# Patient Record
Sex: MALE | Race: WHITE | NOT HISPANIC OR LATINO | Employment: OTHER | URBAN - METROPOLITAN AREA
[De-identification: names, ages, dates, MRNs, and addresses within clinical notes are randomized per-mention and may not be internally consistent; named-entity substitution may affect disease eponyms.]

---

## 2023-04-28 ENCOUNTER — APPOINTMENT (OUTPATIENT)
Dept: CARDIOLOGY | Facility: HOSPITAL | Age: 68
End: 2023-04-28
Payer: MEDICARE

## 2023-04-28 ENCOUNTER — APPOINTMENT (OUTPATIENT)
Dept: GENERAL RADIOLOGY | Facility: HOSPITAL | Age: 68
End: 2023-04-28
Payer: MEDICARE

## 2023-04-28 ENCOUNTER — HOSPITAL ENCOUNTER (INPATIENT)
Facility: HOSPITAL | Age: 68
LOS: 12 days | Discharge: NURSING, REHAB OR LONG-TERM CARE | End: 2023-05-10
Attending: EMERGENCY MEDICINE | Admitting: INTERNAL MEDICINE
Payer: MEDICARE

## 2023-04-28 DIAGNOSIS — I50.21 ACUTE HFREF (HEART FAILURE WITH REDUCED EJECTION FRACTION): ICD-10-CM

## 2023-04-28 DIAGNOSIS — I10 HYPERTENSION, UNSPECIFIED TYPE: ICD-10-CM

## 2023-04-28 DIAGNOSIS — I50.9 ACUTE ON CHRONIC CONGESTIVE HEART FAILURE, UNSPECIFIED HEART FAILURE TYPE: Primary | ICD-10-CM

## 2023-04-28 DIAGNOSIS — R26.2 DIFFICULTY IN WALKING: ICD-10-CM

## 2023-04-28 DIAGNOSIS — Z78.9 DECREASED ACTIVITIES OF DAILY LIVING (ADL): ICD-10-CM

## 2023-04-28 LAB
ALBUMIN SERPL-MCNC: 2.3 G/DL (ref 3.5–5.2)
ALBUMIN/GLOB SERPL: 0.7 G/DL
ALP SERPL-CCNC: 77 U/L (ref 39–117)
ALT SERPL W P-5'-P-CCNC: <5 U/L (ref 1–41)
ANION GAP SERPL CALCULATED.3IONS-SCNC: 9.1 MMOL/L (ref 5–15)
AST SERPL-CCNC: 14 U/L (ref 1–40)
BASOPHILS # BLD AUTO: 0.02 10*3/MM3 (ref 0–0.2)
BASOPHILS NFR BLD AUTO: 0.2 % (ref 0–1.5)
BILIRUB SERPL-MCNC: 0.3 MG/DL (ref 0–1.2)
BUN SERPL-MCNC: 13 MG/DL (ref 8–23)
BUN/CREAT SERPL: 21.3 (ref 7–25)
CALCIUM SPEC-SCNC: 8.1 MG/DL (ref 8.6–10.5)
CHLORIDE SERPL-SCNC: 100 MMOL/L (ref 98–107)
CO2 SERPL-SCNC: 27.9 MMOL/L (ref 22–29)
CREAT SERPL-MCNC: 0.61 MG/DL (ref 0.76–1.27)
DEPRECATED RDW RBC AUTO: 53.1 FL (ref 37–54)
EGFRCR SERPLBLD CKD-EPI 2021: 105.9 ML/MIN/1.73
EOSINOPHIL # BLD AUTO: 0.1 10*3/MM3 (ref 0–0.4)
EOSINOPHIL NFR BLD AUTO: 1.2 % (ref 0.3–6.2)
ERYTHROCYTE [DISTWIDTH] IN BLOOD BY AUTOMATED COUNT: 16.1 % (ref 12.3–15.4)
GEN 5 2HR TROPONIN T REFLEX: 153 NG/L
GLOBULIN UR ELPH-MCNC: 3.4 GM/DL
GLUCOSE BLDC GLUCOMTR-MCNC: 109 MG/DL (ref 70–99)
GLUCOSE BLDC GLUCOMTR-MCNC: 134 MG/DL (ref 70–99)
GLUCOSE SERPL-MCNC: 138 MG/DL (ref 65–99)
HCT VFR BLD AUTO: 28.1 % (ref 37.5–51)
HGB BLD-MCNC: 8.8 G/DL (ref 13–17.7)
HOLD SPECIMEN: NORMAL
HOLD SPECIMEN: NORMAL
IMM GRANULOCYTES # BLD AUTO: 0.03 10*3/MM3 (ref 0–0.05)
IMM GRANULOCYTES NFR BLD AUTO: 0.4 % (ref 0–0.5)
LYMPHOCYTES # BLD AUTO: 1.07 10*3/MM3 (ref 0.7–3.1)
LYMPHOCYTES NFR BLD AUTO: 12.8 % (ref 19.6–45.3)
MCH RBC QN AUTO: 28.5 PG (ref 26.6–33)
MCHC RBC AUTO-ENTMCNC: 31.3 G/DL (ref 31.5–35.7)
MCV RBC AUTO: 90.9 FL (ref 79–97)
MONOCYTES # BLD AUTO: 0.45 10*3/MM3 (ref 0.1–0.9)
MONOCYTES NFR BLD AUTO: 5.4 % (ref 5–12)
NEUTROPHILS NFR BLD AUTO: 6.72 10*3/MM3 (ref 1.7–7)
NEUTROPHILS NFR BLD AUTO: 80 % (ref 42.7–76)
NRBC BLD AUTO-RTO: 0 /100 WBC (ref 0–0.2)
NT-PROBNP SERPL-MCNC: ABNORMAL PG/ML (ref 0–900)
PLATELET # BLD AUTO: 367 10*3/MM3 (ref 140–450)
PMV BLD AUTO: 10.4 FL (ref 6–12)
POTASSIUM SERPL-SCNC: 4 MMOL/L (ref 3.5–5.2)
PROT SERPL-MCNC: 5.7 G/DL (ref 6–8.5)
QT INTERVAL: 389 MS
RBC # BLD AUTO: 3.09 10*6/MM3 (ref 4.14–5.8)
SODIUM SERPL-SCNC: 137 MMOL/L (ref 136–145)
TROPONIN T DELTA: -11 NG/L
TROPONIN T SERPL HS-MCNC: 164 NG/L
WBC NRBC COR # BLD: 8.39 10*3/MM3 (ref 3.4–10.8)
WHOLE BLOOD HOLD COAG: NORMAL
WHOLE BLOOD HOLD SPECIMEN: NORMAL

## 2023-04-28 PROCEDURE — 99285 EMERGENCY DEPT VISIT HI MDM: CPT

## 2023-04-28 PROCEDURE — 25010000002 FUROSEMIDE PER 20 MG: Performed by: EMERGENCY MEDICINE

## 2023-04-28 PROCEDURE — 99223 1ST HOSP IP/OBS HIGH 75: CPT | Performed by: INTERNAL MEDICINE

## 2023-04-28 PROCEDURE — 84484 ASSAY OF TROPONIN QUANT: CPT | Performed by: EMERGENCY MEDICINE

## 2023-04-28 PROCEDURE — 82948 REAGENT STRIP/BLOOD GLUCOSE: CPT

## 2023-04-28 PROCEDURE — 93005 ELECTROCARDIOGRAM TRACING: CPT | Performed by: EMERGENCY MEDICINE

## 2023-04-28 PROCEDURE — 80053 COMPREHEN METABOLIC PANEL: CPT | Performed by: EMERGENCY MEDICINE

## 2023-04-28 PROCEDURE — 25010000002 HEPARIN (PORCINE) PER 1000 UNITS: Performed by: INTERNAL MEDICINE

## 2023-04-28 PROCEDURE — 85025 COMPLETE CBC W/AUTO DIFF WBC: CPT | Performed by: EMERGENCY MEDICINE

## 2023-04-28 PROCEDURE — 36415 COLL VENOUS BLD VENIPUNCTURE: CPT

## 2023-04-28 PROCEDURE — 83880 ASSAY OF NATRIURETIC PEPTIDE: CPT | Performed by: EMERGENCY MEDICINE

## 2023-04-28 PROCEDURE — 71045 X-RAY EXAM CHEST 1 VIEW: CPT

## 2023-04-28 PROCEDURE — 25010000002 FUROSEMIDE PER 20 MG: Performed by: INTERNAL MEDICINE

## 2023-04-28 PROCEDURE — 93306 TTE W/DOPPLER COMPLETE: CPT

## 2023-04-28 PROCEDURE — 93306 TTE W/DOPPLER COMPLETE: CPT | Performed by: INTERNAL MEDICINE

## 2023-04-28 RX ORDER — NICOTINE POLACRILEX 4 MG
15 LOZENGE BUCCAL
Status: DISCONTINUED | OUTPATIENT
Start: 2023-04-28 | End: 2023-05-10 | Stop reason: HOSPADM

## 2023-04-28 RX ORDER — SODIUM CHLORIDE 0.9 % (FLUSH) 0.9 %
10 SYRINGE (ML) INJECTION EVERY 12 HOURS SCHEDULED
Status: DISCONTINUED | OUTPATIENT
Start: 2023-04-28 | End: 2023-05-10 | Stop reason: HOSPADM

## 2023-04-28 RX ORDER — ASPIRIN 81 MG/1
81 TABLET, CHEWABLE ORAL 2 TIMES DAILY
Status: DISCONTINUED | OUTPATIENT
Start: 2023-04-28 | End: 2023-05-09

## 2023-04-28 RX ORDER — FUROSEMIDE 10 MG/ML
60 INJECTION INTRAMUSCULAR; INTRAVENOUS ONCE
Status: COMPLETED | OUTPATIENT
Start: 2023-04-28 | End: 2023-04-28

## 2023-04-28 RX ORDER — PREGABALIN 75 MG/1
75 CAPSULE ORAL 2 TIMES DAILY
Status: DISCONTINUED | OUTPATIENT
Start: 2023-04-28 | End: 2023-05-05

## 2023-04-28 RX ORDER — SODIUM CHLORIDE 0.9 % (FLUSH) 0.9 %
10 SYRINGE (ML) INJECTION AS NEEDED
Status: DISCONTINUED | OUTPATIENT
Start: 2023-04-28 | End: 2023-05-10 | Stop reason: HOSPADM

## 2023-04-28 RX ORDER — OXYCODONE HYDROCHLORIDE 5 MG/1
5 TABLET ORAL EVERY 4 HOURS PRN
Status: DISCONTINUED | OUTPATIENT
Start: 2023-04-28 | End: 2023-05-05

## 2023-04-28 RX ORDER — INSULIN LISPRO 100 [IU]/ML
2-7 INJECTION, SOLUTION INTRAVENOUS; SUBCUTANEOUS
Status: DISCONTINUED | OUTPATIENT
Start: 2023-04-28 | End: 2023-05-10 | Stop reason: HOSPADM

## 2023-04-28 RX ORDER — LISINOPRIL 10 MG/1
10 TABLET ORAL DAILY
Status: DISCONTINUED | OUTPATIENT
Start: 2023-04-28 | End: 2023-05-01

## 2023-04-28 RX ORDER — ASPIRIN 81 MG/1
81 TABLET, CHEWABLE ORAL 2 TIMES DAILY
COMMUNITY

## 2023-04-28 RX ORDER — HEPARIN SODIUM 5000 [USP'U]/ML
5000 INJECTION, SOLUTION INTRAVENOUS; SUBCUTANEOUS EVERY 8 HOURS SCHEDULED
Status: DISCONTINUED | OUTPATIENT
Start: 2023-04-28 | End: 2023-05-10 | Stop reason: HOSPADM

## 2023-04-28 RX ORDER — PREGABALIN 75 MG/1
75 CAPSULE ORAL 2 TIMES DAILY
COMMUNITY

## 2023-04-28 RX ORDER — DEXTROSE MONOHYDRATE 25 G/50ML
25 INJECTION, SOLUTION INTRAVENOUS
Status: DISCONTINUED | OUTPATIENT
Start: 2023-04-28 | End: 2023-05-10 | Stop reason: HOSPADM

## 2023-04-28 RX ORDER — ATORVASTATIN CALCIUM 40 MG/1
80 TABLET, FILM COATED ORAL NIGHTLY
COMMUNITY

## 2023-04-28 RX ORDER — FUROSEMIDE 10 MG/ML
40 INJECTION INTRAMUSCULAR; INTRAVENOUS
Status: DISCONTINUED | OUTPATIENT
Start: 2023-04-28 | End: 2023-05-01

## 2023-04-28 RX ORDER — ACETAMINOPHEN 325 MG/1
650 TABLET ORAL EVERY 4 HOURS PRN
Status: DISCONTINUED | OUTPATIENT
Start: 2023-04-28 | End: 2023-05-05

## 2023-04-28 RX ORDER — ATORVASTATIN CALCIUM 40 MG/1
80 TABLET, FILM COATED ORAL NIGHTLY
Status: DISCONTINUED | OUTPATIENT
Start: 2023-04-28 | End: 2023-05-10 | Stop reason: HOSPADM

## 2023-04-28 RX ORDER — LISINOPRIL 10 MG/1
10 TABLET ORAL DAILY
COMMUNITY
End: 2023-05-10 | Stop reason: HOSPADM

## 2023-04-28 RX ORDER — SODIUM CHLORIDE 9 MG/ML
40 INJECTION, SOLUTION INTRAVENOUS AS NEEDED
Status: DISCONTINUED | OUTPATIENT
Start: 2023-04-28 | End: 2023-05-10 | Stop reason: HOSPADM

## 2023-04-28 RX ORDER — OXYCODONE HYDROCHLORIDE 5 MG/1
10 TABLET ORAL EVERY 4 HOURS PRN
COMMUNITY
End: 2023-05-10 | Stop reason: HOSPADM

## 2023-04-28 RX ORDER — IBUPROFEN 600 MG/1
1 TABLET ORAL
Status: DISCONTINUED | OUTPATIENT
Start: 2023-04-28 | End: 2023-05-10 | Stop reason: HOSPADM

## 2023-04-28 RX ORDER — ACETAMINOPHEN 325 MG/1
325 TABLET ORAL EVERY 6 HOURS PRN
COMMUNITY

## 2023-04-28 RX ADMIN — ASPIRIN 81 MG 81 MG: 81 TABLET ORAL at 21:03

## 2023-04-28 RX ADMIN — FUROSEMIDE 40 MG: 10 INJECTION, SOLUTION INTRAMUSCULAR; INTRAVENOUS at 17:50

## 2023-04-28 RX ADMIN — OXYCODONE 5 MG: 5 TABLET ORAL at 18:09

## 2023-04-28 RX ADMIN — HEPARIN SODIUM 5000 UNITS: 5000 INJECTION INTRAVENOUS; SUBCUTANEOUS at 21:03

## 2023-04-28 RX ADMIN — LISINOPRIL 10 MG: 10 TABLET ORAL at 17:50

## 2023-04-28 RX ADMIN — ATORVASTATIN CALCIUM 80 MG: 40 TABLET, FILM COATED ORAL at 21:03

## 2023-04-28 RX ADMIN — FUROSEMIDE 60 MG: 10 INJECTION, SOLUTION INTRAMUSCULAR; INTRAVENOUS at 11:45

## 2023-04-28 RX ADMIN — PREGABALIN 75 MG: 75 CAPSULE ORAL at 21:03

## 2023-04-28 RX ADMIN — Medication 10 ML: at 21:03

## 2023-04-28 NOTE — H&P
[facility]   HOSPITALIST HISTORY AND PHYSICAL  Date: 2023   Patient Name: Balbir Khan  : 1955  MRN: [id number]  Primary Care Physician:  Rom Barrett PA-C  Date of admission: 2023    Subjective   Subjective     Chief Complaint: This of breath.    HPI:    Balbir Khan is a 67 y.o. male past medical history of coronary artery disease status post CABG, dyslipidemia, hypertension, type 2 diabetes, vascular disease, recent admission for BKA who presents with shortness of breath.    Patient was recently in the hospital at another institution for BKA.  He was sent to [facility] for rehab.  He became short of breath and thought that he needed a blood transfusion.  Patient has no history of heart failure that he knows of.  He has had a CABG with no issues he states that he had a BKA about 10 days ago and started having shortness of breath yesterday.  Result of shortness of breath he came the ER for further evaluation    In the emergency department the patient's vital signs are as follows temperature is 98.7, pulse is 89, respiratory 17, blood pressure 117/69, 84% on room air.  CBC shows a hemoglobin of 8.8 which is close to his baseline.  CMP shows no abnormalities.  BNP is elevated at 24,000 with a troponin of 164 that down trended to 153.  Chest x-ray shows pulmonary vascular congestion increased interstitial opacities could represent underlying pulmonary edema.  Patient admitted to hospital for acute hypoxic respiratory failure due to heart failure exacerbation.    All systems reviewed abnormalities noted above    Personal History     Past Medical History:  Heart failure with unknown EF  Dyslipidemia  Hypertension  Type 2 diabetes      Past Surgical History:  CABG  Bilateral BKA    Family History:   No family history of heart for    Social History:   No tobacco.  No alcohol.    Home Medications:  acetaminophen, aspirin, atorvastatin, insulin aspart, lactobacillus, lisinopril,  metFORMIN, oxyCODONE, pregabalin, and psyllium    Allergies:  Allergies   Allergen Reactions   • Ibuprofen Unknown - Low Severity       Objective   Objective     Vitals:   Temp:  [98.7 °F (37.1 °C)] 98.7 °F (37.1 °C)  Heart Rate:  [81-89] 81  Resp:  [17] 17  BP: (114-117)/(69-74) 117/69    Physical Exam    Constitutional: Awake, alert, no acute distress   Eyes: Pupils equal, sclerae anicteric, no conjunctival injection   HENT: NCAT, mucous membranes moist   Neck: Supple, no thyromegaly, no lymphadenopathy, trachea midline   Respiratory: Crackles at bases   Cardiovascular: RRR, no murmurs, rubs, or gallops, palpable pedal pulses bilaterally   Gastrointestinal: Positive bowel sounds, soft, nontender, nondistended   Musculoskeletal: BKA bilateral   Psychiatric: Appropriate affect, cooperative   Neurologic: Oriented x 3, strength symmetric in all extremities, Cranial Nerves grossly intact to confrontation, speech clear   Skin: No rashes     Result Review    Result Review:  I have personally reviewed the results from the time of this admission to 4/28/2023 13:20 EDT and agree with these findings:  BNP is 24,000  Troponin is 153    Assessment & Plan   Assessment / Plan     Assessment/Plan:   Acute hypoxic respiratory failure  Acute heart failure exacerbation with unknown EF  Recent BKA  Type 2 diabetes  Coronary artery disease with history of CABG  Elevated troponin due to demand from heart failure exacerbation    Patient had a BKA about 10 days ago.  I assume that the patient has had possible heart failure postoperatively.  He has no known history of heart failure.  We will get an echocardiogram to better understand EF.      Plan:  -- Admit to hospital service  -- Oxygen for saturations less than 90%  -- 40 of IV Lasix twice daily  -- Echocardiogram  -- Heart healthy diet with carb consistent  -- 2 g sodium restriction  -- 2000 mL fluid restrict  -- Sliding scale insulin  -- Continue aspirin and atorvastatin      DVT  prophylaxis:  Lovenox    CODE STATUS:     Full code    Admission Status:  I believe this patient meets admission status.    Electronically signed by John Sullivan MD, 04/28/23, 1:20 PM EDT.

## 2023-04-28 NOTE — PLAN OF CARE
Goal Outcome Evaluation:      PT admission from the ED. Wife at bedside with pt. Pt Alert and Oriented X4. VSS.

## 2023-04-28 NOTE — ED PROVIDER NOTES
Time: 9:46 AM EDT  Date of encounter:  4/28/2023  Independent Historian/Clinical History and Information was obtained by:   Patient and EMS  Chief Complaint: SOB    History is limited by: N/A    History of Present Illness:  Patient is a 67 y.o. year old male who presents to the emergency department from [facility] for evaluation of shortness of breath.  Onset of symptoms is reported to be today. EMS states patient has been a little confused per rehab staff..  Patient states he was on his way to physical therapy when he became short of breath. Patient states he does not have hx of lung problems.  Patient denies any fevers or chills.  He denies any cough.  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  He is currently at [facility] for rehab.    History provided by:  Patient and EMS personnel   used: No        Patient Care Team  Primary Care Provider: Rom Barrett PA-C    Past Medical History:     Allergies   Allergen Reactions   • Ibuprofen Unknown - Low Severity     Past Medical History:   Diagnosis Date   • Diabetes mellitus    • Hyperlipidemia    • Hypertension    • Macular degeneration    • Myocardial infarction      Past Surgical History:   Procedure Laterality Date   • BELOW KNEE AMPUTATION Bilateral    • CATARACT EXTRACTION     • CORONARY ARTERY BYPASS GRAFT     • KNEE SURGERY Bilateral      History reviewed. No pertinent family history.    Home Medications:  Prior to Admission medications    Medication Sig Start Date End Date Taking? Authorizing Provider   acetaminophen (TYLENOL) 325 MG tablet Take 1 tablet by mouth Every 6 (Six) Hours As Needed for Mild Pain.    Carlita Hill MD   aspirin 81 MG chewable tablet Chew 1 tablet 2 (Two) Times a Day.    ProviderCarlita MD   atorvastatin (LIPITOR) 80 MG tablet Take 1 tablet by mouth Every Night.    Carlita Hill MD   insulin aspart (novoLOG FLEXPEN) 100 UNIT/ML  solution pen-injector sc pen Inject  under the skin into the appropriate area as directed 3 (Three) Times a Day With Meals.    Carlita Hill MD   lactobacillus (BACID) tablet caplet Take  by mouth Every Night.    Carlita Hill MD   lisinopril (PRINIVIL,ZESTRIL) 10 MG tablet Take 1 tablet by mouth Daily.    Carlita Hill MD   metFORMIN (GLUCOPHAGE) 500 MG tablet Take 2 tablets by mouth 2 (Two) Times a Day With Meals.    Carlita Hill MD   oxyCODONE (OXY-IR) 5 MG capsule Take 1 capsule by mouth Every 4 (Four) Hours As Needed for Moderate Pain.    Carlita Hill MD   pregabalin (LYRICA) 75 MG capsule Take 1 capsule by mouth 2 (Two) Times a Day.    Carlita Hill MD   psyllium (METAMUCIL) 58.6 % packet Take 1 packet by mouth Daily.    Carlita Hill MD        Social History:   Social History     Tobacco Use   • Smoking status: Never   • Smokeless tobacco: Never   Vaping Use   • Vaping Use: Never used   Substance Use Topics   • Alcohol use: Never   • Drug use: Never         Review of Systems:  Review of Systems   Constitutional: Negative for chills and fever.   HENT: Negative for congestion, rhinorrhea and sore throat.    Eyes: Negative for photophobia.   Respiratory: Positive for shortness of breath. Negative for apnea, cough and chest tightness.    Cardiovascular: Negative for chest pain and palpitations.   Gastrointestinal: Negative for abdominal pain, diarrhea, nausea and vomiting.   Endocrine: Negative.    Genitourinary: Negative for difficulty urinating and dysuria.   Musculoskeletal: Negative for back pain, joint swelling and myalgias.   Skin: Negative for color change and wound.   Allergic/Immunologic: Negative.    Neurological: Negative for seizures and headaches.   Psychiatric/Behavioral: Positive for confusion.   All other systems reviewed and are negative.       Physical Exam:  /68 (BP Location: Left arm, Patient Position: Lying)   Pulse 74   Temp  98.1 °F (36.7 °C) (Oral)   Resp 18   Wt 77.7 kg (171 lb 4.8 oz)   SpO2 99%     Physical Exam  Vitals and nursing note reviewed.   Constitutional:       General: He is awake. He is not in acute distress.     Appearance: Normal appearance.   HENT:      Head: Normocephalic and atraumatic.      Nose: Nose normal.      Mouth/Throat:      Mouth: Mucous membranes are moist.   Eyes:      Extraocular Movements: Extraocular movements intact.      Pupils: Pupils are equal, round, and reactive to light.   Cardiovascular:      Rate and Rhythm: Normal rate and regular rhythm.      Heart sounds: Normal heart sounds.   Pulmonary:      Effort: Pulmonary effort is normal. No respiratory distress.      Breath sounds: Examination of the right-lower field reveals rales. Examination of the left-lower field reveals rales. Rales present. No wheezing or rhonchi.   Abdominal:      General: Bowel sounds are normal.      Palpations: Abdomen is soft.      Tenderness: There is no abdominal tenderness. There is no guarding or rebound.      Comments: No rigidity   Musculoskeletal:         General: No tenderness. Normal range of motion.      Cervical back: Normal range of motion and neck supple.      Comments: Right stump wrapped in ace wrap. Left side is well healed.      Right Lower Extremity: Right leg is amputated below knee.      Left Lower Extremity: Left leg is amputated below knee.   Skin:     General: Skin is warm and dry.      Coloration: Skin is pale. Skin is not jaundiced.   Neurological:      General: No focal deficit present.      Mental Status: He is alert and oriented to person, place, and time. Mental status is at baseline.      Sensory: Sensation is intact.      Motor: Motor function is intact.      Coordination: Coordination is intact.   Psychiatric:         Attention and Perception: Attention and perception normal.         Mood and Affect: Mood and affect normal.         Speech: Speech normal.         Behavior: Behavior  normal.         Judgment: Judgment normal.                  Procedures:  Procedures      Medical Decision Making:      Comorbidities that affect care:    Hypelipidemia, Diabetes, Hypertension, myocardial infarction.,  Macular degeneration, peripheral vascular disease    External Notes reviewed:    None- the patient's recent hospitalization is not available through epic records at this time.      The following orders were placed and all results were independently analyzed by me:  Orders Placed This Encounter   Procedures   • XR Chest 1 View   • [place] Draw   • Comprehensive Metabolic Panel   • BNP   • Single High Sensitivity Troponin T   • CBC Auto Differential   • High Sensitivity Troponin T 2Hr   • Comprehensive Metabolic Panel   • CBC Auto Differential   • Magnesium   • Diet: Cardiac Diets, Diabetic Diets, Fluid Restriction (240 mL/tray) Diets; Low Sodium (2g); Consistent Carbohydrate; 2000 mL/day; Texture: Regular Texture (IDDSI 7); Fluid Consistency: Thin (IDDSI 0)   • Undress & Gown   • Cardiac Monitoring   • Continuous Pulse Oximetry   • Vital Signs   • Vital Signs   • Intake & Output   • Weigh Patient   • Oral Care   • Saline Lock & Maintain IV Access   • Daily Weights   • Strict Intake & Output   • Discontinue Insulin Infusion at Specified Time After Basal Dose Administered   • Discontinue Glucommander IV Insulin Order Set After Transition Complete   • Discontinue Glucommander After Transition Complete   • Code Status and Medical Interventions:   • Hospitalist (on-call MD unless specified)   • Inpatient Case Management  Consult   • Oxygen Therapy- Nasal Cannula; Titrate for SPO2: 90% - 95%   • POC Glucose Once   • POC Glucose TID AC   • POC Glucose Once   • ECG 12 Lead ED Triage Standing Order; SOA   • Adult Transthoracic Echo Complete W/ Cont if Necessary Per Protocol   • Consult to Wound / Ostomy Care   • Insert Peripheral IV   • Insert Peripheral IV   • Inpatient Admission   • CBC &  Differential   • Green Top (Gel)   • Lavender Top   • Gold Top - SST   • Light Blue Top       Medications Given in the Emergency Department:  Medications   sodium chloride 0.9 % flush 10 mL (has no administration in time range)   sodium chloride 0.9 % flush 10 mL (has no administration in time range)   sodium chloride 0.9 % flush 10 mL (has no administration in time range)   sodium chloride 0.9 % infusion 40 mL (has no administration in time range)   heparin (porcine) 5000 UNIT/ML injection 5,000 Units (has no administration in time range)   acetaminophen (TYLENOL) tablet 650 mg (has no administration in time range)   furosemide (LASIX) injection 40 mg (40 mg Intravenous Given 4/28/23 1750)   oxyCODONE (ROXICODONE) immediate release tablet 5 mg (5 mg Oral Incomplete 4/28/23 1809)   aspirin chewable tablet 81 mg (has no administration in time range)   atorvastatin (LIPITOR) tablet 80 mg (has no administration in time range)   lisinopril (PRINIVIL,ZESTRIL) tablet 10 mg (10 mg Oral Given 4/28/23 1750)   pregabalin (LYRICA) capsule 75 mg (has no administration in time range)   dextrose (GLUTOSE) oral gel 15 g (has no administration in time range)   dextrose (D50W) (25 g/50 mL) IV injection 25 g (has no administration in time range)   glucagon (GLUCAGEN) injection 1 mg (has no administration in time range)   Insulin Lispro (humaLOG) injection 2-7 Units (2 Units Subcutaneous Not Given 4/28/23 1735)   furosemide (LASIX) injection 60 mg (60 mg Intravenous Given 4/28/23 1145)        ED Course:    ED Course as of 04/28/23 1827 Fri Apr 28, 2023 1819 EKG performed at 923 was interpreted by me that show a normal sinus rhythm with a ventricular rate of 85 bpm.  The NM interval was 220 ms.  This represents a first-degree AV block.  P waves were normal.  QRS interval was widened at 148 ms.  Patient has a right bundle branch block morphology.  Axis is leftward at -40 degrees.  There is no acute ischemic ST or T wave changes  identified.  There were no old EKGs available for comparison. [TB]      ED Course User Index  [TB] Bhaskar Abraham DO     The patient was seen and evaluated in the ED by me.  The above history and physical examination was performed as documented.  Diagnostic data was obtained.  Results reviewed.  Discussed with the patient.  Patient's x-rays along with laboratory evaluations is consistent with that of a congestive heart failure picture which would also account for the patient's hypoxia upon arrival.  Patient was placed on supplemental O2.  Patient was given a dose of IV furosemide.  Patient is noted to have had a excellent response to this already with over a liter of urine output.  Patient's will require acute hospitalization for further diuresis and other medical management.  Patient is agreeable to this.  I did discuss case with the hospitalist service who agreed admit the patient.    Labs:    Lab Results (last 24 hours)     Procedure Component Value Units Date/Time    CBC & Differential [[id number]]  (Abnormal) Collected: 04/28/23 0400    Specimen: Blood Updated: 04/28/23 0513    Narrative:      The following orders were created for panel order CBC & Differential.  Procedure                               Abnormality         Status                     ---------                               -----------         ------                     CBC Auto Differential[[id number]]        Abnormal            Final result                 Please view results for these tests on the individual orders.    Basic Metabolic Panel [[id number]]  (Abnormal) Collected: 04/28/23 0400    Specimen: Blood Updated: 04/28/23 0535     Glucose 76 mg/dL      BUN 14 mg/dL      Creatinine 0.60 mg/dL      Sodium 138 mmol/L      Potassium 4.0 mmol/L      Chloride 103 mmol/L      CO2 27.4 mmol/L      Calcium 7.9 mg/dL      BUN/Creatinine Ratio 23.3     Anion Gap 7.6 mmol/L      eGFR 106.5 mL/min/1.73     Narrative:      GFR Normal >60  Chronic Kidney  Disease <60  Kidney Failure <15      BNP (IN-HOUSE) [[id number]]  (Abnormal) Collected: 04/28/23 0400    Specimen: Blood Updated: 04/28/23 0532     proBNP 26,050.0 pg/mL     Narrative:      Among patients with dyspnea, NT-proBNP is highly sensitive for the detection of acute congestive heart failure. In addition NT-proBNP of <300 pg/ml effectively rules out acute congestive heart failure with 99% negative predictive value.      CBC Auto Differential [[id number]]  (Abnormal) Collected: 04/28/23 0400    Specimen: Blood Updated: 04/28/23 0513     WBC 8.07 10*3/mm3      RBC 3.08 10*6/mm3      Hemoglobin 8.8 g/dL      Hematocrit 28.0 %      MCV 90.9 fL      MCH 28.6 pg      MCHC 31.4 g/dL      RDW 16.1 %      RDW-SD 53.1 fl      MPV 11.7 fL      Platelets 356 10*3/mm3      Neutrophil % 73.1 %      Lymphocyte % 16.7 %      Monocyte % 6.9 %      Eosinophil % 2.4 %      Basophil % 0.5 %      Immature Grans % 0.4 %      Neutrophils, Absolute 5.90 10*3/mm3      Lymphocytes, Absolute 1.35 10*3/mm3      Monocytes, Absolute 0.56 10*3/mm3      Eosinophils, Absolute 0.19 10*3/mm3      Basophils, Absolute 0.04 10*3/mm3      Immature Grans, Absolute 0.03 10*3/mm3      nRBC 0.0 /100 WBC     CBC & Differential [[id number]]  (Abnormal) Collected: 04/28/23 0939    Specimen: Blood Updated: 04/28/23 0947    Narrative:      The following orders were created for panel order CBC & Differential.  Procedure                               Abnormality         Status                     ---------                               -----------         ------                     CBC Auto Differential[[id number]]        Abnormal            Final result                 Please view results for these tests on the individual orders.    Comprehensive Metabolic Panel [[id number]]  (Abnormal) Collected: 04/28/23 0939    Specimen: Blood Updated: 04/28/23 1021     Glucose 138 mg/dL      BUN 13 mg/dL      Creatinine 0.61 mg/dL      Sodium 137 mmol/L      Potassium 4.0  mmol/L      Chloride 100 mmol/L      CO2 27.9 mmol/L      Calcium 8.1 mg/dL      Total Protein 5.7 g/dL      Albumin 2.3 g/dL      ALT (SGPT) <5 U/L      AST (SGOT) 14 U/L      Alkaline Phosphatase 77 U/L      Total Bilirubin 0.3 mg/dL      Globulin 3.4 gm/dL      A/G Ratio 0.7 g/dL      BUN/Creatinine Ratio 21.3     Anion Gap 9.1 mmol/L      eGFR 105.9 mL/min/1.73     Narrative:      GFR Normal >60  Chronic Kidney Disease <60  Kidney Failure <15      BNP [[id number]]  (Abnormal) Collected: 04/28/23 0939    Specimen: Blood Updated: 04/28/23 1008     proBNP 24,697.0 pg/mL     Narrative:      Among patients with dyspnea, NT-proBNP is highly sensitive for the detection of acute congestive heart failure. In addition NT-proBNP of <300 pg/ml effectively rules out acute congestive heart failure with 99% negative predictive value.      Single High Sensitivity Troponin T [[id number]]  (Abnormal) Collected: 04/28/23 0939    Specimen: Blood Updated: 04/28/23 1021     HS Troponin T 164 ng/L     Narrative:      High Sensitive Troponin T Reference Range:  <10.0 ng/L- Negative Female for AMI  <15.0 ng/L- Negative Male for AMI  >=10 - Abnormal Female indicating possible myocardial injury.  >=15 - Abnormal Male indicating possible myocardial injury.   Clinicians would have to utilize clinical acumen, EKG, Troponin, and serial changes to determine if it is an Acute Myocardial Infarction or myocardial injury due to an underlying chronic condition.         CBC Auto Differential [[id number]]  (Abnormal) Collected: 04/28/23 0939    Specimen: Blood Updated: 04/28/23 0947     WBC 8.39 10*3/mm3      RBC 3.09 10*6/mm3      Hemoglobin 8.8 g/dL      Hematocrit 28.1 %      MCV 90.9 fL      MCH 28.5 pg      MCHC 31.3 g/dL      RDW 16.1 %      RDW-SD 53.1 fl      MPV 10.4 fL      Platelets 367 10*3/mm3      Neutrophil % 80.0 %      Lymphocyte % 12.8 %      Monocyte % 5.4 %      Eosinophil % 1.2 %      Basophil % 0.2 %      Immature Grans % 0.4 %       Neutrophils, Absolute 6.72 10*3/mm3      Lymphocytes, Absolute 1.07 10*3/mm3      Monocytes, Absolute 0.45 10*3/mm3      Eosinophils, Absolute 0.10 10*3/mm3      Basophils, Absolute 0.02 10*3/mm3      Immature Grans, Absolute 0.03 10*3/mm3      nRBC 0.0 /100 WBC     High Sensitivity Troponin T 2Hr [[id number]]  (Abnormal) Collected: 04/28/23 1150    Specimen: Blood Updated: 04/28/23 1233     HS Troponin T 153 ng/L      Troponin T Delta -11 ng/L     Narrative:      High Sensitive Troponin T Reference Range:  <10.0 ng/L- Negative Female for AMI  <15.0 ng/L- Negative Male for AMI  >=10 - Abnormal Female indicating possible myocardial injury.  >=15 - Abnormal Male indicating possible myocardial injury.   Clinicians would have to utilize clinical acumen, EKG, Troponin, and serial changes to determine if it is an Acute Myocardial Infarction or myocardial injury due to an underlying chronic condition.         POC Glucose Once [[id number]]  (Abnormal) Collected: 04/28/23 1242    Specimen: Blood Updated: 04/28/23 1245     Glucose 134 mg/dL      Comment: Serial Number: [id number]:  [id number]       POC Glucose Once [[id number]]  (Abnormal) Collected: 04/28/23 1723    Specimen: Blood Updated: 04/28/23 1734     Glucose 109 mg/dL      Comment: Serial Number: [id number]:  [id number]              Imaging:    XR Chest 1 View    Result Date: 4/28/2023  PROCEDURE: XR CHEST 1 VW  COMPARISON: None  INDICATIONS: shortness of air  FINDINGS:  Study is limited by overlying support and monitoring apparatus.  Lung volumes are low.  Patient is status post median sternotomy and CABG.  There is pulmonary vascular congestion and increased ground-glass and interstitial opacities with a perihilar and bibasilar predominance.  More focal consolidation is noted in the mid and lower lung zones on the right.  There appears to be a small right-sided pleural effusion.  Osseous structures are unremarkable        1. Pulmonary vascular congestion  and increased interstitial opacities which could represent underlying pulmonary edema.  Asymmetric opacity on the right may represent superimposed pneumonia, atelectasis and/or asymmetric edema.  There appears to be at least a small right-sided pleural effusion       ABBEY BENITO MD       Electronically Signed and Approved By: ABBEY BENITO MD on 4/28/2023 at 10:20                 Differential Diagnosis and Discussion:    Dyspnea: Differential diagnosis includes but is not limited to metabolic acidosis, neurological disorders, psychogenic, asthma, pneumothorax, upper airway obstruction, COPD, pneumonia, noncardiogenic pulmonary edema, interstitial lung disease, anemia, congestive heart failure, and pulmonary embolism    All labs were reviewed and interpreted by me.  All X-rays impressions were independently interpreted by me.  EKG was interpreted by me.    [facility]     Critical Care Note: Total Critical Care time of 35 minutes. Total critical care time documented does not include time spent on separately billed procedures for services of nurses or physician assistants. I personally saw and examined the patient. I have reviewed all diagnostic interpretations and treatment plans as written. I was present for the key portions of any procedures performed and the inclusive time noted in any critical care statement. Critical care time includes patient management by me, time spent at the patients bedside,  time to review lab and imaging results, discussing patient care, documentation in the medical record, and time spent with family or caregiver.    Patient Care Considerations:    ANTIBIOTICS: I considered prescribing antibiotics as an outpatient however However there is no indication for an acute bacterial infection      Consultants/Shared Management Plan:    Hospitalist: I have discussed the case with Dr. Sullivan who agrees to accept the patient for admission.    Social Determinants of Health:    Patient is independent,  reliable, and has access to care.       Disposition and Care Coordination:    Admit:   Through independent evaluation of the patient's history, physical, and imperical data, the patient meets criteria for observation/admission to the hospital.        Final diagnoses:   Acute on chronic congestive heart failure, unspecified heart failure type        ED Disposition     ED Disposition   Decision to Admit    Condition   --    Comment   Level of Care: Telemetry [5]   Diagnosis: Acute on chronic congestive heart failure, unspecified heart failure type [1330027]   Isolate for COVID?: No [0]   Certification: I Certify That Inpatient Hospital Services Are Medically Necessary For Greater Than 2 Midnights               This medical record created using voice recognition software.        Documentation assistance provided by Dawna Silver acting as scribe for John Sullivan MD. Information recorded by the scribe was done at my direction and has been verified and validated by me.          Dawna Silver  04/28/23 0954       Bhaskar Abraham DO  04/28/23 [id number]

## 2023-04-29 LAB
ALBUMIN SERPL-MCNC: 1.9 G/DL (ref 3.5–5.2)
ALBUMIN/GLOB SERPL: 0.6 G/DL
ALP SERPL-CCNC: 73 U/L (ref 39–117)
ALT SERPL W P-5'-P-CCNC: <5 U/L (ref 1–41)
ANION GAP SERPL CALCULATED.3IONS-SCNC: 6.5 MMOL/L (ref 5–15)
ASCENDING AORTA: 3.8 CM
AST SERPL-CCNC: 12 U/L (ref 1–40)
BASOPHILS # BLD AUTO: 0.04 10*3/MM3 (ref 0–0.2)
BASOPHILS NFR BLD AUTO: 0.6 % (ref 0–1.5)
BH CV ECHO MEAS - AO MAX PG: 10 MMHG
BH CV ECHO MEAS - AO MEAN PG: 4.4 MMHG
BH CV ECHO MEAS - AO ROOT DIAM: 3.5 CM
BH CV ECHO MEAS - AO V2 MAX: 159 CM/SEC
BH CV ECHO MEAS - AO V2 VTI: 27.1 CM
BH CV ECHO MEAS - AVA(I,D): 2.4 CM2
BH CV ECHO MEAS - EDV(CUBED): 234.4 ML
BH CV ECHO MEAS - EDV(MOD-SP2): 154 ML
BH CV ECHO MEAS - EDV(MOD-SP4): 131 ML
BH CV ECHO MEAS - EF(MOD-BP): 46.1 %
BH CV ECHO MEAS - EF(MOD-SP2): 41.3 %
BH CV ECHO MEAS - EF(MOD-SP4): 48.8 %
BH CV ECHO MEAS - ESV(CUBED): 103.8 ML
BH CV ECHO MEAS - ESV(MOD-SP2): 90.4 ML
BH CV ECHO MEAS - ESV(MOD-SP4): 67.1 ML
BH CV ECHO MEAS - FS: 23.8 %
BH CV ECHO MEAS - IVS/LVPW: 1 CM
BH CV ECHO MEAS - IVSD: 1.03 CM
BH CV ECHO MEAS - LA DIMENSION: 5.1 CM
BH CV ECHO MEAS - LAT PEAK E' VEL: 6.7 CM/SEC
BH CV ECHO MEAS - LV DIASTOLIC VOL/BSA (35-75): 65.7 CM2
BH CV ECHO MEAS - LV MASS(C)D: 267.9 GRAMS
BH CV ECHO MEAS - LV MAX PG: 3.4 MMHG
BH CV ECHO MEAS - LV MEAN PG: 1.6 MMHG
BH CV ECHO MEAS - LV SYSTOLIC VOL/BSA (12-30): 33.7 CM2
BH CV ECHO MEAS - LV V1 MAX: 91.7 CM/SEC
BH CV ECHO MEAS - LV V1 VTI: 17 CM
BH CV ECHO MEAS - LVIDD: 6.2 CM
BH CV ECHO MEAS - LVIDS: 4.7 CM
BH CV ECHO MEAS - LVOT AREA: 3.8 CM2
BH CV ECHO MEAS - LVOT DIAM: 2.2 CM
BH CV ECHO MEAS - LVPWD: 1.03 CM
BH CV ECHO MEAS - MED PEAK E' VEL: 6.1 CM/SEC
BH CV ECHO MEAS - MR MAX PG: 100.4 MMHG
BH CV ECHO MEAS - MR MAX VEL: 501 CM/SEC
BH CV ECHO MEAS - MR MEAN PG: 66.7 MMHG
BH CV ECHO MEAS - MR MEAN VEL: 385.3 CM/SEC
BH CV ECHO MEAS - MR VTI: 173 CM
BH CV ECHO MEAS - MV A MAX VEL: 45.8 CM/SEC
BH CV ECHO MEAS - MV DEC SLOPE: 457.5 CM/SEC2
BH CV ECHO MEAS - MV DEC TIME: 0.22 MSEC
BH CV ECHO MEAS - MV E MAX VEL: 79.3 CM/SEC
BH CV ECHO MEAS - MV E/A: 1.73
BH CV ECHO MEAS - MV MAX PG: 3.66 MMHG
BH CV ECHO MEAS - MV MEAN PG: 1.52 MMHG
BH CV ECHO MEAS - MV P1/2T: 66.2 MSEC
BH CV ECHO MEAS - MV V2 VTI: 19.9 CM
BH CV ECHO MEAS - MVA(P1/2T): 3.3 CM2
BH CV ECHO MEAS - MVA(VTI): 3.3 CM2
BH CV ECHO MEAS - PI END-D VEL: 103.2 CM/SEC
BH CV ECHO MEAS - RAP SYSTOLE: 8 MMHG
BH CV ECHO MEAS - RF(MV,LVOT)(1DIAM): 0.76 CM
BH CV ECHO MEAS - RVDD: 3.1 CM
BH CV ECHO MEAS - RVSP: 56.6 MMHG
BH CV ECHO MEAS - SI(MOD-SP2): 31.9 ML/M2
BH CV ECHO MEAS - SI(MOD-SP4): 32.1 ML/M2
BH CV ECHO MEAS - SV(LVOT): 64.9 ML
BH CV ECHO MEAS - SV(MOD-SP2): 63.6 ML
BH CV ECHO MEAS - SV(MOD-SP4): 63.9 ML
BH CV ECHO MEAS - TAPSE (>1.6): 1.75 CM
BH CV ECHO MEAS - TR MAX PG: 48.6 MMHG
BH CV ECHO MEAS - TR MAX VEL: 348.4 CM/SEC
BH CV ECHO MEASUREMENTS AVERAGE E/E' RATIO: 12.39
BILIRUB SERPL-MCNC: 0.2 MG/DL (ref 0–1.2)
BUN SERPL-MCNC: 12 MG/DL (ref 8–23)
BUN/CREAT SERPL: 19 (ref 7–25)
CALCIUM SPEC-SCNC: 8 MG/DL (ref 8.6–10.5)
CHLORIDE SERPL-SCNC: 103 MMOL/L (ref 98–107)
CO2 SERPL-SCNC: 28.5 MMOL/L (ref 22–29)
CREAT SERPL-MCNC: 0.63 MG/DL (ref 0.76–1.27)
DEPRECATED RDW RBC AUTO: 51.5 FL (ref 37–54)
EGFRCR SERPLBLD CKD-EPI 2021: 104.9 ML/MIN/1.73
EOSINOPHIL # BLD AUTO: 0.15 10*3/MM3 (ref 0–0.4)
EOSINOPHIL NFR BLD AUTO: 2.3 % (ref 0.3–6.2)
ERYTHROCYTE [DISTWIDTH] IN BLOOD BY AUTOMATED COUNT: 15.8 % (ref 12.3–15.4)
GLOBULIN UR ELPH-MCNC: 3.3 GM/DL
GLUCOSE BLDC GLUCOMTR-MCNC: 110 MG/DL (ref 70–99)
GLUCOSE BLDC GLUCOMTR-MCNC: 112 MG/DL (ref 70–99)
GLUCOSE BLDC GLUCOMTR-MCNC: 153 MG/DL (ref 70–99)
GLUCOSE SERPL-MCNC: 107 MG/DL (ref 65–99)
HCT VFR BLD AUTO: 26.9 % (ref 37.5–51)
HGB BLD-MCNC: 8.6 G/DL (ref 13–17.7)
IMM GRANULOCYTES # BLD AUTO: 0.02 10*3/MM3 (ref 0–0.05)
IMM GRANULOCYTES NFR BLD AUTO: 0.3 % (ref 0–0.5)
IVRT: 95 MSEC
LEFT ATRIUM VOLUME INDEX: 35.1 ML/M2
LYMPHOCYTES # BLD AUTO: 1.2 10*3/MM3 (ref 0.7–3.1)
LYMPHOCYTES NFR BLD AUTO: 18.5 % (ref 19.6–45.3)
Lab: 0.3 CM
Lab: 13.6 %
MAGNESIUM SERPL-MCNC: 1.4 MG/DL (ref 1.6–2.4)
MAXIMAL PREDICTED HEART RATE: 154 BPM
MCH RBC QN AUTO: 28.9 PG (ref 26.6–33)
MCHC RBC AUTO-ENTMCNC: 32 G/DL (ref 31.5–35.7)
MCV RBC AUTO: 90.3 FL (ref 79–97)
MONOCYTES # BLD AUTO: 0.47 10*3/MM3 (ref 0.1–0.9)
MONOCYTES NFR BLD AUTO: 7.2 % (ref 5–12)
NEUTROPHILS NFR BLD AUTO: 4.62 10*3/MM3 (ref 1.7–7)
NEUTROPHILS NFR BLD AUTO: 71.1 % (ref 42.7–76)
NRBC BLD AUTO-RTO: 0 /100 WBC (ref 0–0.2)
PISA ALIASING VEL: 37.1 M/S
PISA RADIUS: 0.6 CM
PLATELET # BLD AUTO: 366 10*3/MM3 (ref 140–450)
PMV BLD AUTO: 10.5 FL (ref 6–12)
POTASSIUM SERPL-SCNC: 3.7 MMOL/L (ref 3.5–5.2)
PROT SERPL-MCNC: 5.2 G/DL (ref 6–8.5)
RBC # BLD AUTO: 2.98 10*6/MM3 (ref 4.14–5.8)
SODIUM SERPL-SCNC: 138 MMOL/L (ref 136–145)
STRESS TARGET HR: 131 BPM
WBC NRBC COR # BLD: 6.5 10*3/MM3 (ref 3.4–10.8)

## 2023-04-29 PROCEDURE — 99222 1ST HOSP IP/OBS MODERATE 55: CPT | Performed by: INTERNAL MEDICINE

## 2023-04-29 PROCEDURE — 25010000002 MAGNESIUM SULFATE 2 GM/50ML SOLUTION

## 2023-04-29 PROCEDURE — 80053 COMPREHEN METABOLIC PANEL: CPT | Performed by: INTERNAL MEDICINE

## 2023-04-29 PROCEDURE — 82948 REAGENT STRIP/BLOOD GLUCOSE: CPT

## 2023-04-29 PROCEDURE — 83735 ASSAY OF MAGNESIUM: CPT | Performed by: INTERNAL MEDICINE

## 2023-04-29 PROCEDURE — 25010000002 HEPARIN (PORCINE) PER 1000 UNITS: Performed by: INTERNAL MEDICINE

## 2023-04-29 PROCEDURE — 99233 SBSQ HOSP IP/OBS HIGH 50: CPT | Performed by: FAMILY MEDICINE

## 2023-04-29 PROCEDURE — 25010000002 FUROSEMIDE PER 20 MG: Performed by: INTERNAL MEDICINE

## 2023-04-29 PROCEDURE — 63710000001 INSULIN LISPRO (HUMAN) PER 5 UNITS: Performed by: INTERNAL MEDICINE

## 2023-04-29 PROCEDURE — 85025 COMPLETE CBC W/AUTO DIFF WBC: CPT | Performed by: INTERNAL MEDICINE

## 2023-04-29 RX ORDER — MAGNESIUM SULFATE HEPTAHYDRATE 40 MG/ML
2 INJECTION, SOLUTION INTRAVENOUS ONCE
Status: COMPLETED | OUTPATIENT
Start: 2023-04-29 | End: 2023-04-29

## 2023-04-29 RX ORDER — POTASSIUM CHLORIDE 750 MG/1
20 CAPSULE, EXTENDED RELEASE ORAL
Status: COMPLETED | OUTPATIENT
Start: 2023-04-29 | End: 2023-04-29

## 2023-04-29 RX ORDER — CARVEDILOL 3.12 MG/1
3.12 TABLET ORAL 2 TIMES DAILY WITH MEALS
Status: DISCONTINUED | OUTPATIENT
Start: 2023-04-29 | End: 2023-04-30

## 2023-04-29 RX ADMIN — HEPARIN SODIUM 5000 UNITS: 5000 INJECTION INTRAVENOUS; SUBCUTANEOUS at 14:32

## 2023-04-29 RX ADMIN — FUROSEMIDE 40 MG: 10 INJECTION, SOLUTION INTRAMUSCULAR; INTRAVENOUS at 09:11

## 2023-04-29 RX ADMIN — Medication 10 ML: at 20:50

## 2023-04-29 RX ADMIN — INSULIN LISPRO 2 UNITS: 100 INJECTION, SOLUTION INTRAVENOUS; SUBCUTANEOUS at 12:58

## 2023-04-29 RX ADMIN — ATORVASTATIN CALCIUM 80 MG: 40 TABLET, FILM COATED ORAL at 20:51

## 2023-04-29 RX ADMIN — MAGNESIUM SULFATE HEPTAHYDRATE 2 G: 2 INJECTION, SOLUTION INTRAVENOUS at 05:46

## 2023-04-29 RX ADMIN — POTASSIUM CHLORIDE 20 MEQ: 10 CAPSULE, COATED, EXTENDED RELEASE ORAL at 18:12

## 2023-04-29 RX ADMIN — HEPARIN SODIUM 5000 UNITS: 5000 INJECTION INTRAVENOUS; SUBCUTANEOUS at 21:00

## 2023-04-29 RX ADMIN — OXYCODONE 5 MG: 5 TABLET ORAL at 13:09

## 2023-04-29 RX ADMIN — Medication 10 ML: at 09:12

## 2023-04-29 RX ADMIN — ASPIRIN 81 MG 81 MG: 81 TABLET ORAL at 20:51

## 2023-04-29 RX ADMIN — OXYCODONE 5 MG: 5 TABLET ORAL at 18:12

## 2023-04-29 RX ADMIN — FUROSEMIDE 40 MG: 10 INJECTION, SOLUTION INTRAMUSCULAR; INTRAVENOUS at 18:12

## 2023-04-29 RX ADMIN — PREGABALIN 75 MG: 75 CAPSULE ORAL at 09:11

## 2023-04-29 RX ADMIN — ASPIRIN 81 MG 81 MG: 81 TABLET ORAL at 09:11

## 2023-04-29 RX ADMIN — CARVEDILOL 3.12 MG: 3.12 TABLET, FILM COATED ORAL at 18:12

## 2023-04-29 RX ADMIN — Medication 400 MG: at 05:47

## 2023-04-29 RX ADMIN — OXYCODONE 5 MG: 5 TABLET ORAL at 05:47

## 2023-04-29 RX ADMIN — POTASSIUM CHLORIDE 20 MEQ: 10 CAPSULE, COATED, EXTENDED RELEASE ORAL at 12:57

## 2023-04-29 RX ADMIN — LISINOPRIL 10 MG: 10 TABLET ORAL at 09:11

## 2023-04-29 RX ADMIN — HEPARIN SODIUM 5000 UNITS: 5000 INJECTION INTRAVENOUS; SUBCUTANEOUS at 05:03

## 2023-04-29 RX ADMIN — OXYCODONE 5 MG: 5 TABLET ORAL at 00:23

## 2023-04-29 RX ADMIN — PREGABALIN 75 MG: 75 CAPSULE ORAL at 20:51

## 2023-04-29 NOTE — PLAN OF CARE
Goal Outcome Evaluation: Patients coccyx treated with barrier cream and turned throughout shift. Pt. Up to bedside commode having one bowel movement with some blood. Pain treated per Mar.

## 2023-04-29 NOTE — PROGRESS NOTES
[facility]   Hospitalist Progress Note  Date: 2023  Patient Name: Balbir Khan  : 1955  MRN: [id number]  Date of admission: 2023      Subjective   Subjective     Chief Complaint: Follow-up shortness of breath    Summary:Balbir Khan is a 67 y.o. male past medical history of coronary artery disease status post CABG, dyslipidemia, hypertension, type 2 diabetes, vascular disease, recent admission for BKA who presents with shortness of breath. Patient was recently in the hospital at another institution for BKA.  He was sent to [facility] for rehab.  He became short of breath and thought that he needed a blood transfusion.  Patient has no history of heart failure that he knows of.  He has had a CABG with no issues he states that he had a BKA about 10 days ago and started having shortness of breath yesterday.  Result of shortness of breath he came the ER for further evaluation.     In the emergency department the patient's vital signs are as follows temperature is 98.7, pulse is 89, respiratory 17, blood pressure 117/69, 84% on room air.  CBC shows a hemoglobin of 8.8 which is close to his baseline.  CMP shows no abnormalities.  BNP is elevated at 24,000 with a troponin of 164 that down trended to 153.  Chest x-ray shows pulmonary vascular congestion increased interstitial opacities could represent underlying pulmonary edema.  Patient admitted to hospital for acute hypoxic respiratory failure due to heart failure exacerbation.  Patient started on IV diuretics.  Echocardiogram demonstrated EF of 35%.  Cardiology consulted.  Started on aspirin Coreg.  Patient had Raymond placed at [facility] due to urinary retention.  We will continue during diuresis and attempt voiding trial once volume status improves.    Interval Followup: Patient lying in bed resting comfortably with family at the bedside.  Patient indicates shortness of breath is improving slowly.  Patient denies any chest pain or chest  pressure.  Sinus rhythm PVCs 60s 80s on telemetry review.  Potassium and magnesium low this morning.  No other issues per nursing.    Review of Systems  Constitutional: Negative for fatigue and fever.   HENT: Negative for sore throat and trouble swallowing.    Eyes: Negative for pain and discharge.   Respiratory: Negative for cough and positive shortness of breath.    Cardiovascular: Negative for chest pain and palpitations.   Gastrointestinal: Negative for abdominal pain, nausea and vomiting.   Endocrine: Negative for cold intolerance and heat intolerance.   Genitourinary: Negative for difficulty urinating and dysuria.   Musculoskeletal: Negative for back pain and neck stiffness.   Skin: Negative for color change and rash.   Neurological: Negative for syncope and headaches.   Hematological: Negative for adenopathy.   Psychiatric/Behavioral: Negative for confusion and hallucinations.    Objective   Objective     Vitals:   Temp:  [97.3 °F (36.3 °C)-98.2 °F (36.8 °C)] 97.7 °F (36.5 °C)  Heart Rate:  [71-84] 76  Resp:  [17-19] 17  BP: (105-129)/(54-71) 116/63  Flow (L/min):  [2] 2  Physical Exam   Gen. well-developed appearing stated age in no acute distress  HEENT: Normocephalic atraumatic moist membranes pupils equal round reactive light, no scleral icterus no conjunctival injection  Cardiovascular: regular rate and rhythm no murmurs rubs or gallops S1-S2, 1+ bilateral lower extremity edema appreciated  Pulmonary: Crackles bilateral posterior lung fields, no wheezes or rhonchi symmetric chest expansion, unlabored, no conversational dyspnea appreciated  Gastrointestinal: Soft nontender nondistended positive bowel sounds all 4 quadrants no rebound or guarding  Musculoskeletal: No clubbing cyanosis, warm and well-perfused, calves soft symmetric nontender bilaterally  Skin: Clean dry without rashs  Neuro: Cranial nerves II through XII intact grossly no sensorimotor deficits appreciated bilateral upper and lower  extremities  Psych: Patient is calm cooperative and appropriate with exam not responding to internal stimuli  : Raymond catheter draining clear urine no bladder distention no suprapubic tenderness    Result Review    Result Review:  I have personally reviewed these results and agree with these findings:  [x]  Laboratory  LAB RESULTS:      Lab 04/29/23  0433 04/28/23  0939 04/28/23  0400 04/24/23  0600   WBC 6.50 8.39 8.07 8.82   HEMOGLOBIN 8.6* 8.8* 8.8* 7.9*   HEMATOCRIT 26.9* 28.1* 28.0* 24.8*   PLATELETS 366 367 356 315   NEUTROS ABS 4.62 6.72 5.90 6.59   IMMATURE GRANS (ABS) 0.02 0.03 0.03 0.02   LYMPHS ABS 1.20 1.07 1.35 1.40   MONOS ABS 0.47 0.45 0.56 0.60   EOS ABS 0.15 0.10 0.19 0.18   MCV 90.3 90.9 90.9 90.2         Lab 04/29/23  0433 04/28/23  0939 04/28/23  0400   SODIUM 138 137 138   POTASSIUM 3.7 4.0 4.0   CHLORIDE 103 100 103   CO2 28.5 27.9 27.4   ANION GAP 6.5 9.1 7.6   BUN 12 13 14   CREATININE 0.63* 0.61* 0.60*   EGFR 104.9 105.9 106.5   GLUCOSE 107* 138* 76   CALCIUM 8.0* 8.1* 7.9*   MAGNESIUM 1.4*  --   --          Lab 04/29/23  0433 04/28/23  0939   TOTAL PROTEIN 5.2* 5.7*   ALBUMIN 1.9* 2.3*   GLOBULIN 3.3 3.4   ALT (SGPT) <5 <5   AST (SGOT) 12 14   BILIRUBIN 0.2 0.3   ALK PHOS 73 77         Lab 04/28/23  1150 04/28/23  0939 04/28/23  0400   PROBNP  --  24,697.0* 26,050.0*   HSTROP T 153* 164*  --                  Brief Urine Lab Results  (Last result in the past 365 days)      Color   Clarity   Blood   Leuk Est   Nitrite   Protein   CREAT   Urine HCG        04/23/23 1000 Yellow   Clear   Negative   Trace   Negative   Trace               Microbiology Results (last 10 days)     Procedure Component Value - Date/Time    Urine Culture [[id number]]  (Abnormal)  (Susceptibility) Collected: 04/23/23 1000    Lab Status: Final result Specimen: Urine, Clean Catch Updated: 04/25/23 0051     Urine Culture 25,000 CFU/mL Pseudomonas aeruginosa    Narrative:      Colonization of the urinary tract without  infection is common. Treatment is discouraged unless the patient is symptomatic, pregnant, or undergoing an invasive urologic procedure.    Susceptibility      Pseudomonas aeruginosa      YAHIR      Cefepime Susceptible      Ceftazidime Susceptible      Ciprofloxacin Susceptible      Gentamicin Susceptible      Levofloxacin Susceptible      Piperacillin + Tazobactam Susceptible                                 [x]  Microbiology  [x]  Radiology  XR Chest 1 View    Result Date: 4/28/2023    1. Pulmonary vascular congestion and increased interstitial opacities which could represent underlying pulmonary edema.  Asymmetric opacity on the right may represent superimposed pneumonia, atelectasis and/or asymmetric edema.  There appears to be at least a small right-sided pleural effusion       ABBEY BENITO MD       Electronically Signed and Approved By: ABBEY BENITO MD on 4/28/2023 at 10:20               [x]  EKG/Telemetry   [x]  Cardiology/Vascular    Transthoracic echocardiogram 4/28/2023  •  Global hypokinesis with ejection fraction estimated at 35±5%.  •  The left ventricular cavity is mildly dilated.  •  Left ventricular diastolic function is consistent with (grade I) impaired relaxation and age.  •  The right atrial cavity is mildly  dilated.  The left atrial cavity is moderately dilated.  •  Moderate mitral valve regurgitation is present.  •  Estimated right ventricular systolic pressure from tricuspid regurgitation is markedly elevated (>55 mmHg).  •  Mild to moderate tricuspid regurgitation.  •  Pleural effusion is noted.    []  Pathology  []  Old records  [x]  Other:  Scheduled Meds:aspirin, 81 mg, Oral, BID  atorvastatin, 80 mg, Oral, Nightly  carvedilol, 3.125 mg, Oral, BID With Meals  furosemide, 40 mg, Intravenous, BID  heparin (porcine), 5,000 Units, Subcutaneous, Q8H  insulin lispro, 2-7 Units, Subcutaneous, TID With Meals  lisinopril, 10 mg, Oral, Daily  potassium chloride, 20 mEq, Oral, TID With  Meals  pregabalin, 75 mg, Oral, BID  sodium chloride, 10 mL, Intravenous, Q12H      Continuous Infusions:   PRN Meds:.•  acetaminophen  •  dextrose  •  dextrose  •  glucagon (human recombinant)  •  oxyCODONE  •  sodium chloride  •  sodium chloride  •  sodium chloride      Assessment & Plan   Assessment / Plan     Assessment/Plan:  Acute congestive heart failure with reduced ejection fraction  Acute hypoxic respiratory failure  Recent BKA  Type 2 diabetes mellitus  Coronary artery disease status post CABG  Elevated troponin likely due to demand ischemia from heart failure exacerbation  Hypomagnesemia  Hypokalemia  Urinary retention        Patient admitted for further evaluation and treatment  Cardiology consulted thank you for your assistance  Continue furosemide 40 mg IV twice daily  Continue strict I's and O's  Continue daily weights  Start Coreg and titrate per cardiology  Continue lisinopril  Continue supplemental nasal cannula oxygen titrated keep sats greater than 90%  Continue sliding-scale insulin  Start aspirin  Continue atorvastatin  Replace magnesium and potassium recheck in a.m.  Continue Raymond catheter during diuresis and will attempt voiding trial once volume status improves  Consult physical therapy occupational therapy to evaluate and treat  Further inpatient orders recommendations pending clinical course      Discussed plan with bedside RN as well as cardiology attending.    Disposition: PT OT eval's pending likely require inpatient rehab on discharge.    DVT prophylaxis:  Medical DVT prophylaxis orders are present.    CODE STATUS:   Level Of Support Discussed With: Patient  Code Status (Patient has no pulse and is not breathing): CPR (Attempt to Resuscitate)  Medical Interventions (Patient has pulse or is breathing): Full Support

## 2023-04-29 NOTE — PLAN OF CARE
Goal Outcome Evaluation:  Plan of Care Reviewed With: patient        Progress: improving  Outcome Evaluation: Alert and oriented x 4 but can become confused at times easily reoriented. VSS.  Pale in color.  Echo done this shift. Medicated with PRN meds for pain with relief.  Raymond to bedside drainage clear yellow urine noted.  Fluid restriction maintained.  Remains on 2L of oxygen per nasal cannula, no SOA stated.  Rested in bed eyes closed most of shift.  WCTM

## 2023-04-29 NOTE — SIGNIFICANT NOTE
04/29/23 0511   Provider Notification   Reason for Communication Status update   Provider Name Danyelle Nichole PA-C   Notification Route Phone call   Response See orders     Magnesium 1.4,  new orders received.

## 2023-04-29 NOTE — CONSULTS
[facility]   Cardiology Consult Note    Patient Name: Balbir Khan  : 1955  MRN: [id number]  Primary Care Physician:  Rom Barrett PA-C  Referring Physician: No ref. provider found  Date of admission: 2023    Subjective   Subjective     Reason for Consult/ Chief Complaint: Shortness of breath    HPI:  Balbir Khan is a 67 y.o. male with past medical history significant for coronary artery disease status post CABG in , severe peripheral arterial disease status post below the knee amputation in the past and in the other leg done 10 days ago.  He was sent to rehab after this.  He states he was overall doing fine until yesterday when he became very short of breath.  He did note orthopnea.  He has not seen a cardiologist it sounds like in about 10 years and does not regularly go to a primary care physician.  He has a history of diabetes, hypertension and hyperlipidemia.  He noted some swelling in his previous below the knee amputation for 2 days but it resolved.    Review of Systems   All systems were reviewed and negative except for: Shortness of breath, orthopnea    Personal History     Past Medical History:   Diagnosis Date   • Diabetes mellitus    • Hyperlipidemia    • Hypertension    • Macular degeneration    • Myocardial infarction         Past medical history reviewed      Family History: family history is not on file. Otherwise pertinent FHx was reviewed and not pertinent to current issue.    Social History:  reports that he has never smoked. He has never used smokeless tobacco. He reports that he does not drink alcohol and does not use drugs.    Home Medications:  acetaminophen, aspirin, atorvastatin, glucagon, insulin aspart, lactobacillus, lisinopril, metFORMIN, oxyCODONE, pregabalin, and psyllium    Allergies:  Allergies   Allergen Reactions   • Ibuprofen Unknown - Low Severity       Objective    Objective     Vitals:   Temp:  [97.3 °F (36.3 °C)-98.2 °F (36.8 °C)] 97.9 °F (36.6  °C)  Heart Rate:  [71-84] 71  Resp:  [18-19] 19  BP: (105-129)/(54-71) 116/60  Flow (L/min):  [2-3] 2      Physical Exam:   Constitutional: Awake, alert, No acute distress    Eyes: PERRLA, sclerae anicteric, no conjunctival injection   HENT: NCAT, mucous membranes moist   Neck: Supple, no thyromegaly, no lymphadenopathy, trachea midline   Respiratory: Clear to auscultation bilaterally, nonlabored respirations    Cardiovascular: RRR, no murmurs, rubs, or gallops, palpable pedal pulses bilaterally   Gastrointestinal: Positive bowel sounds, soft, nontender, nondistended   Musculoskeletal: No bilateral ankle edema, no clubbing or cyanosis to extremities   Psychiatric: Appropriate affect, cooperative   Neurologic: Oriented x 3, strength symmetric in all extremities, Cranial Nerves grossly intact to confrontation, speech clear   Skin: No rashes     Result Review    Result Review:  I have personally reviewed the results from the time of this admission to 4/29/2023 13:39 EDT and agree with these findings:  [x]  Laboratory  []  Microbiology  [x]  Radiology  [x]  EKG/Telemetry   [x]  Cardiology/Vascular   []  Pathology  [x]  Old records  []  Other:  Most notable findings include:     CMP        4/22/2023    06:30 4/28/2023    04:00 4/28/2023    09:39 4/29/2023    04:33   CMP   Glucose 101   76   138   107     BUN 17   14   13   12     Creatinine 0.73   0.60   0.61   0.63     EGFR 100.3   106.5   105.9   104.9     Sodium 136   138   137   138     Potassium 4.6   4.0   4.0   3.7     Chloride 103   103   100   103     Calcium 7.8   7.9   8.1   8.0     Total Protein 5.2    5.7   5.2     Albumin 1.6    2.3   1.9     Globulin 3.6    3.4   3.3     Total Bilirubin 0.2    0.3   0.2     Alkaline Phosphatase 76    77   73     AST (SGOT) 14    14   12     ALT (SGPT) <5    <5   <5     Albumin/Globulin Ratio 0.4    0.7   0.6     BUN/Creatinine Ratio 23.3   23.3   21.3   19.0     Anion Gap 8.4   7.6   9.1   6.5        CBC        4/24/2023     06:00 4/28/2023    04:00 4/28/2023    09:39 4/29/2023    04:33   CBC   WBC 8.82   8.07   8.39   6.50     RBC 2.75   3.08   3.09   2.98     Hemoglobin 7.9   8.8   8.8   8.6     Hematocrit 24.8   28.0   28.1   26.9     MCV 90.2   90.9   90.9   90.3     MCH 28.7   28.6   28.5   28.9     MCHC 31.9   31.4   31.3   32.0     RDW 16.5   16.1   16.1   15.8     Platelets 315   356   367   366        Lab Results   Component Value Date    TROPONINT 153 (C) 04/28/2023         Assessment & Plan   Assessment / Plan     Brief Patient Summary:  Balbir Khan is a 67 y.o. male who has a history of coronary artery disease status post CABG and severe peripheral arterial disease status post previous below the knee amputation and then recent other side below the knee amputation 10 days ago.  He presents with signs of acute, congestive heart failure.  He has not seen a cardiologist in a long time so we do not know exactly what his ejection fraction has been running since the bypass in 2012.    Active Hospital Problems:  Active Hospital Problems    Diagnosis    • **Acute on chronic congestive heart failure, unspecified heart failure type        Assessment:  1.  Acute, systolic congestive heart failure  2.  History of coronary artery disease  3.  Hypertension  4.  Hyperlipidemia  5.  Severe peripheral arterial disease status post bilateral below the knee amputation.    Plan:   1.  We will start an aspirin 81 mg daily.  He has not been taking this regularly as an outpatient.  2.  Agree with the Lasix 40 IV twice daily.  Patient had a beta natruretic peptide of 24,000 and a chest x-ray that showed pulmonary edema.  His echocardiogram shows an ejection fraction of about 35±5% with global hypokinesis.  I do not know if his EF is been running this way for a long time and this recent surgery may be he got excessive IV fluids which threw him into this or if this is an acute change in his ejection fraction as he does not follow with a  cardiologist.  3.  We will add Coreg 3.125 mg p.o. twice daily and slowly titrate this up as blood pressure tolerates.  He is already on lisinopril.  4.  Continue the statin.  5.  When patient is discharged he is going to be set up with a cardiologist closer to his home.  He is just here at this time for rehab after his BKA.  6.  Patient has minimally elevated, fairly stable troponins more consistent with acute, systolic congestive heart failure.    Electronically signed by Caleb Soto MD, 04/29/23, 1:39 PM EDT.

## 2023-04-30 LAB
ALBUMIN SERPL-MCNC: 2.2 G/DL (ref 3.5–5.2)
ANION GAP SERPL CALCULATED.3IONS-SCNC: 4.8 MMOL/L (ref 5–15)
BUN SERPL-MCNC: 14 MG/DL (ref 8–23)
BUN/CREAT SERPL: 21.9 (ref 7–25)
CALCIUM SPEC-SCNC: 8.2 MG/DL (ref 8.6–10.5)
CHLORIDE SERPL-SCNC: 102 MMOL/L (ref 98–107)
CO2 SERPL-SCNC: 33.2 MMOL/L (ref 22–29)
CREAT SERPL-MCNC: 0.64 MG/DL (ref 0.76–1.27)
EGFRCR SERPLBLD CKD-EPI 2021: 104.4 ML/MIN/1.73
GLUCOSE BLDC GLUCOMTR-MCNC: 104 MG/DL (ref 70–99)
GLUCOSE BLDC GLUCOMTR-MCNC: 135 MG/DL (ref 70–99)
GLUCOSE BLDC GLUCOMTR-MCNC: 197 MG/DL (ref 70–99)
GLUCOSE SERPL-MCNC: 105 MG/DL (ref 65–99)
MAGNESIUM SERPL-MCNC: 1.6 MG/DL (ref 1.6–2.4)
PHOSPHATE SERPL-MCNC: 2.8 MG/DL (ref 2.5–4.5)
POTASSIUM SERPL-SCNC: 4.2 MMOL/L (ref 3.5–5.2)
SODIUM SERPL-SCNC: 140 MMOL/L (ref 136–145)
WHOLE BLOOD HOLD SPECIMEN: NORMAL

## 2023-04-30 PROCEDURE — 83735 ASSAY OF MAGNESIUM: CPT | Performed by: FAMILY MEDICINE

## 2023-04-30 PROCEDURE — 25010000002 FUROSEMIDE PER 20 MG: Performed by: INTERNAL MEDICINE

## 2023-04-30 PROCEDURE — 99232 SBSQ HOSP IP/OBS MODERATE 35: CPT | Performed by: INTERNAL MEDICINE

## 2023-04-30 PROCEDURE — 25010000002 HEPARIN (PORCINE) PER 1000 UNITS: Performed by: INTERNAL MEDICINE

## 2023-04-30 PROCEDURE — 63710000001 INSULIN LISPRO (HUMAN) PER 5 UNITS: Performed by: INTERNAL MEDICINE

## 2023-04-30 PROCEDURE — 82948 REAGENT STRIP/BLOOD GLUCOSE: CPT

## 2023-04-30 PROCEDURE — 80069 RENAL FUNCTION PANEL: CPT | Performed by: FAMILY MEDICINE

## 2023-04-30 PROCEDURE — 99233 SBSQ HOSP IP/OBS HIGH 50: CPT | Performed by: FAMILY MEDICINE

## 2023-04-30 PROCEDURE — 94799 UNLISTED PULMONARY SVC/PX: CPT

## 2023-04-30 RX ORDER — POTASSIUM CHLORIDE 750 MG/1
10 CAPSULE, EXTENDED RELEASE ORAL 2 TIMES DAILY WITH MEALS
Status: DISCONTINUED | OUTPATIENT
Start: 2023-04-30 | End: 2023-05-01

## 2023-04-30 RX ORDER — CARVEDILOL 6.25 MG/1
6.25 TABLET ORAL 2 TIMES DAILY WITH MEALS
Status: DISCONTINUED | OUTPATIENT
Start: 2023-04-30 | End: 2023-05-06

## 2023-04-30 RX ORDER — DIAPER,BRIEF,INFANT-TODD,DISP
1 EACH MISCELLANEOUS EVERY 12 HOURS SCHEDULED
Status: DISCONTINUED | OUTPATIENT
Start: 2023-04-30 | End: 2023-05-10 | Stop reason: HOSPADM

## 2023-04-30 RX ADMIN — PREGABALIN 75 MG: 75 CAPSULE ORAL at 20:20

## 2023-04-30 RX ADMIN — Medication 10 ML: at 20:20

## 2023-04-30 RX ADMIN — CARVEDILOL 3.12 MG: 3.12 TABLET, FILM COATED ORAL at 08:36

## 2023-04-30 RX ADMIN — Medication 400 MG: at 20:20

## 2023-04-30 RX ADMIN — HEPARIN SODIUM 5000 UNITS: 5000 INJECTION INTRAVENOUS; SUBCUTANEOUS at 13:26

## 2023-04-30 RX ADMIN — OXYCODONE 5 MG: 5 TABLET ORAL at 01:28

## 2023-04-30 RX ADMIN — ASPIRIN 81 MG 81 MG: 81 TABLET ORAL at 08:36

## 2023-04-30 RX ADMIN — Medication 10 ML: at 08:36

## 2023-04-30 RX ADMIN — OXYCODONE 5 MG: 5 TABLET ORAL at 08:36

## 2023-04-30 RX ADMIN — HYDROCORTISONE ACETATE 1 APPLICATION: 1 CREAM TOPICAL at 20:21

## 2023-04-30 RX ADMIN — LISINOPRIL 10 MG: 10 TABLET ORAL at 08:36

## 2023-04-30 RX ADMIN — FUROSEMIDE 40 MG: 10 INJECTION, SOLUTION INTRAMUSCULAR; INTRAVENOUS at 17:12

## 2023-04-30 RX ADMIN — ATORVASTATIN CALCIUM 80 MG: 40 TABLET, FILM COATED ORAL at 20:20

## 2023-04-30 RX ADMIN — INSULIN LISPRO 2 UNITS: 100 INJECTION, SOLUTION INTRAVENOUS; SUBCUTANEOUS at 17:12

## 2023-04-30 RX ADMIN — HEPARIN SODIUM 5000 UNITS: 5000 INJECTION INTRAVENOUS; SUBCUTANEOUS at 05:27

## 2023-04-30 RX ADMIN — POTASSIUM CHLORIDE 10 MEQ: 10 CAPSULE, COATED, EXTENDED RELEASE ORAL at 09:21

## 2023-04-30 RX ADMIN — OXYCODONE 5 MG: 5 TABLET ORAL at 20:21

## 2023-04-30 RX ADMIN — Medication 400 MG: at 09:21

## 2023-04-30 RX ADMIN — ASPIRIN 81 MG 81 MG: 81 TABLET ORAL at 20:20

## 2023-04-30 RX ADMIN — PREGABALIN 75 MG: 75 CAPSULE ORAL at 08:35

## 2023-04-30 RX ADMIN — HEPARIN SODIUM 5000 UNITS: 5000 INJECTION INTRAVENOUS; SUBCUTANEOUS at 20:21

## 2023-04-30 RX ADMIN — POTASSIUM CHLORIDE 10 MEQ: 10 CAPSULE, COATED, EXTENDED RELEASE ORAL at 17:12

## 2023-04-30 RX ADMIN — FUROSEMIDE 40 MG: 10 INJECTION, SOLUTION INTRAMUSCULAR; INTRAVENOUS at 08:36

## 2023-04-30 RX ADMIN — CARVEDILOL 6.25 MG: 6.25 TABLET, FILM COATED ORAL at 17:12

## 2023-04-30 RX ADMIN — HYDROCORTISONE ACETATE 1 APPLICATION: 1 CREAM TOPICAL at 13:25

## 2023-04-30 NOTE — PROGRESS NOTES
Psychiatric   Hospitalist Progress Note  Date: 2023  Patient Name: Balbir Khan  : 1955  MRN: [id number]  Date of admission: 2023      Subjective   Subjective     Chief Complaint: Follow-up shortness of breath    Summary:Balbir Khan is a 67 y.o. male past medical history of coronary artery disease status post CABG, dyslipidemia, hypertension, type 2 diabetes, vascular disease, recent admission for BKA who presents with shortness of breath. Patient was recently in the hospital at another institution for BKA.  He was sent to [facility] for rehab.  He became short of breath and thought that he needed a blood transfusion.  Patient has no history of heart failure that he knows of.  He has had a CABG with no issues he states that he had a BKA about 10 days ago and started having shortness of breath yesterday.  Result of shortness of breath he came the ER for further evaluation.     In the emergency department the patient's vital signs are as follows temperature is 98.7, pulse is 89, respiratory 17, blood pressure 117/69, 84% on room air.  CBC shows a hemoglobin of 8.8 which is close to his baseline.  CMP shows no abnormalities.  BNP is elevated at 24,000 with a troponin of 164 that down trended to 153.  Chest x-ray shows pulmonary vascular congestion increased interstitial opacities could represent underlying pulmonary edema.  Patient admitted to hospital for acute hypoxic respiratory failure due to heart failure exacerbation.  Patient started on IV diuretics.  Echocardiogram demonstrated EF of 35%.  Cardiology consulted.  Started on aspirin Coreg.  Patient had Raymond placed at [facility] due to urinary retention.  We will continue during diuresis and attempt voiding trial once volume status improves.    Interval Followup: Patient lying in bed resting comfortably with family at the bedside.  Patient indicates shortness of breath is still slowly improving. Sinus rhythm PVCs 60s - 80s on telemetry  review.   Magnesium slightly low this morning.  -9 L since admission.  No other issues per nursing.    Review of Systems  Constitutional: Negative for fatigue and fever.   HENT: Negative for sore throat and trouble swallowing.    Eyes: Negative for pain and discharge.   Respiratory: Negative for cough and positive shortness of breath.    Cardiovascular: Negative for chest pain and palpitations.   Gastrointestinal: Negative for abdominal pain, nausea and vomiting.   Endocrine: Negative for cold intolerance and heat intolerance.   Genitourinary: Negative for difficulty urinating and dysuria.   Musculoskeletal: Negative for back pain and neck stiffness.   Skin: Negative for color change and rash.   Neurological: Negative for syncope and headaches.   Hematological: Negative for adenopathy.   Psychiatric/Behavioral: Negative for confusion and hallucinations.    Objective   Objective     Vitals:   Temp:  [97.3 °F (36.3 °C)-99 °F (37.2 °C)] 97.9 °F (36.6 °C)  Heart Rate:  [69-78] 69  Resp:  [16-18] 18  BP: (109-130)/(63-72) 109/63  Flow (L/min):  [2] 2  Physical Exam   Gen. well-developed appearing stated age in no acute distress  HEENT: Normocephalic atraumatic moist membranes pupils equal round reactive light, no scleral icterus no conjunctival injection  Cardiovascular: regular rate and rhythm no murmurs rubs or gallops S1-S2, 1+ bilateral lower extremity edema appreciated  Pulmonary: Crackles bilateral posterior lung fields, no wheezes or rhonchi symmetric chest expansion, unlabored, no conversational dyspnea appreciated  Gastrointestinal: Soft nontender nondistended positive bowel sounds all 4 quadrants no rebound or guarding  Musculoskeletal: No clubbing cyanosis, warm and well-perfused, status post bilateral BKAs  Skin: Clean dry without rashs  Neuro: Cranial nerves II through XII intact grossly no sensorimotor deficits appreciated bilateral upper and lower extremities  Psych: Patient is calm cooperative and  appropriate with exam not responding to internal stimuli  : Raymond catheter draining clear urine no bladder distention no suprapubic tenderness    Result Review    Result Review:  I have personally reviewed these results and agree with these findings:  [x]  Laboratory  LAB RESULTS:      Lab 04/29/23  0433 04/28/23  0939 04/28/23  0400 04/24/23  0600   WBC 6.50 8.39 8.07 8.82   HEMOGLOBIN 8.6* 8.8* 8.8* 7.9*   HEMATOCRIT 26.9* 28.1* 28.0* 24.8*   PLATELETS 366 367 356 315   NEUTROS ABS 4.62 6.72 5.90 6.59   IMMATURE GRANS (ABS) 0.02 0.03 0.03 0.02   LYMPHS ABS 1.20 1.07 1.35 1.40   MONOS ABS 0.47 0.45 0.56 0.60   EOS ABS 0.15 0.10 0.19 0.18   MCV 90.3 90.9 90.9 90.2         Lab 04/30/23  0404 04/29/23  0433 04/28/23  0939 04/28/23  0400   SODIUM 140 138 137 138   POTASSIUM 4.2 3.7 4.0 4.0   CHLORIDE 102 103 100 103   CO2 33.2* 28.5 27.9 27.4   ANION GAP 4.8* 6.5 9.1 7.6   BUN 14 12 13 14   CREATININE 0.64* 0.63* 0.61* 0.60*   EGFR 104.4 104.9 105.9 106.5   GLUCOSE 105* 107* 138* 76   CALCIUM 8.2* 8.0* 8.1* 7.9*   MAGNESIUM 1.6 1.4*  --   --    PHOSPHORUS 2.8  --   --   --          Lab 04/30/23  0404 04/29/23  0433 04/28/23  0939   TOTAL PROTEIN  --  5.2* 5.7*   ALBUMIN 2.2* 1.9* 2.3*   GLOBULIN  --  3.3 3.4   ALT (SGPT)  --  <5 <5   AST (SGOT)  --  12 14   BILIRUBIN  --  0.2 0.3   ALK PHOS  --  73 77         Lab 04/28/23  1150 04/28/23  0939 04/28/23  0400   PROBNP  --  24,697.0* 26,050.0*   HSTROP T 153* 164*  --                  Brief Urine Lab Results  (Last result in the past 365 days)      Color   Clarity   Blood   Leuk Est   Nitrite   Protein   CREAT   Urine HCG        04/23/23 1000 Yellow   Clear   Negative   Trace   Negative   Trace               Microbiology Results (last 10 days)     Procedure Component Value - Date/Time    Urine Culture [[id number]]  (Abnormal)  (Susceptibility) Collected: 04/23/23 1000    Lab Status: Final result Specimen: Urine, Clean Catch Updated: 04/25/23 0051     Urine Culture 25,000  CFU/mL Pseudomonas aeruginosa    Narrative:      Colonization of the urinary tract without infection is common. Treatment is discouraged unless the patient is symptomatic, pregnant, or undergoing an invasive urologic procedure.    Susceptibility      Pseudomonas aeruginosa      YAHIR      Cefepime Susceptible      Ceftazidime Susceptible      Ciprofloxacin Susceptible      Gentamicin Susceptible      Levofloxacin Susceptible      Piperacillin + Tazobactam Susceptible                                 [x]  Microbiology  [x]  Radiology  XR Chest 1 View    Result Date: 4/28/2023    1. Pulmonary vascular congestion and increased interstitial opacities which could represent underlying pulmonary edema.  Asymmetric opacity on the right may represent superimposed pneumonia, atelectasis and/or asymmetric edema.  There appears to be at least a small right-sided pleural effusion       ABBEY BENITO MD       Electronically Signed and Approved By: ABBEY BENITO MD on 4/28/2023 at 10:20               [x]  EKG/Telemetry   [x]  Cardiology/Vascular    Transthoracic echocardiogram 4/28/2023  •  Global hypokinesis with ejection fraction estimated at 35±5%.  •  The left ventricular cavity is mildly dilated.  •  Left ventricular diastolic function is consistent with (grade I) impaired relaxation and age.  •  The right atrial cavity is mildly  dilated.  The left atrial cavity is moderately dilated.  •  Moderate mitral valve regurgitation is present.  •  Estimated right ventricular systolic pressure from tricuspid regurgitation is markedly elevated (>55 mmHg).  •  Mild to moderate tricuspid regurgitation.  •  Pleural effusion is noted.    []  Pathology  []  Old records  [x]  Other:  Scheduled Meds:aspirin, 81 mg, Oral, BID  atorvastatin, 80 mg, Oral, Nightly  carvedilol, 6.25 mg, Oral, BID With Meals  furosemide, 40 mg, Intravenous, BID  heparin (porcine), 5,000 Units, Subcutaneous, Q8H  hydrocortisone, 1 application, Topical,  Q12H  insulin lispro, 2-7 Units, Subcutaneous, TID With Meals  lisinopril, 10 mg, Oral, Daily  magnesium oxide, 400 mg, Oral, BID  potassium chloride, 10 mEq, Oral, BID With Meals  pregabalin, 75 mg, Oral, BID  sodium chloride, 10 mL, Intravenous, Q12H      Continuous Infusions:   PRN Meds:.•  acetaminophen  •  dextrose  •  dextrose  •  glucagon (human recombinant)  •  oxyCODONE  •  sodium chloride  •  sodium chloride  •  sodium chloride      Assessment & Plan   Assessment / Plan     Assessment/Plan:  Acute congestive heart failure with reduced ejection fraction  Acute hypoxic respiratory failure  Recent BKA  Type 2 diabetes mellitus  Coronary artery disease status post CABG  Elevated troponin likely due to demand ischemia from heart failure exacerbation  Hypomagnesemia  Hypokalemia  Urinary retention        Patient admitted for further evaluation and treatment  Cardiology consulted thank you for your assistance  Continue furosemide 40 mg IV twice daily  Continue strict I's and O's  Continue daily weights  Continue Coreg and titrate per cardiology  Continue lisinopril  Continue supplemental nasal cannula oxygen titrated keep sats greater than 90%  Continue sliding-scale insulin  Continue aspirin  Continue atorvastatin  Replace magnesium and potassium eval and recheck in a.m.  Continue Raymond catheter during diuresis and will attempt voiding trial once volume status improves  Consult physical therapy occupational therapy to evaluate and treat  Further inpatient orders recommendations pending clinical course      Discussed plan with bedside RN as well as cardiology attending.    Disposition: Patient wishing to discharge to [facility] skilled nursing and rehab.  Discharge planning to coordinate.    DVT prophylaxis:  Medical DVT prophylaxis orders are present.    CODE STATUS:   Level Of Support Discussed With: Patient  Code Status (Patient has no pulse and is not breathing): CPR (Attempt to Resuscitate)  Medical  Interventions (Patient has pulse or is breathing): Full Support

## 2023-04-30 NOTE — PLAN OF CARE
Goal Outcome Evaluation:  Plan of Care Reviewed With: patient        Progress: improving  Outcome Evaluation: Alert and oriented x 4.  VSS.  Medicated with PRN meds for complaints of right leg pain with relief.  Rested in bed eyes closed most of shift. Following fluid restriction.  WCTM

## 2023-04-30 NOTE — CASE MANAGEMENT/SOCIAL WORK
Discharge Planning Assessment  REY Ferguson     Patient Name: Balbir Khan  MRN: [id number]  Today's Date: 4/30/2023    Admit Date: 4/28/2023    Plan: Per RN charting Pt is from [facility]. SW following up with [facility]. Pt does live at home with wife. Pt has Cane, Walker and Wheelchair.  Pt denies in financial strain with cost of medication, food or utilities. PCP: KELIN Barrett. SW/CM will continue to follow for discharge needs.   Discharge Needs Assessment     Row Name 04/30/23 0944       Living Environment    People in Home spouse    Current Living Arrangements home  [facility]    Potentially Unsafe Housing Conditions none    Primary Care Provided by self;spouse/significant other    Provides Primary Care For no one    Family Caregiver if Needed spouse    Quality of Family Relationships helpful;involved;supportive       Resource/Environmental Concerns    Resource/Environmental Concerns reliable transportation    Financial Concerns medicine, unable to afford    Transportation Concerns no car       Transportation Needs    In the past 12 months, has lack of transportation kept you from medical appointments or from getting medications? no    In the past 12 months, has lack of transportation kept you from meetings, work, or from getting things needed for daily living? No       Food Insecurity    Within the past 12 months, you worried that your food would run out before you got the money to buy more. Never true    Within the past 12 months, the food you bought just didn't last and you didn't have money to get more. Never true       Transition Planning    Patient/Family Anticipates Transition to inpatient rehabilitation facility    Patient/Family Anticipated Services at Transition rehabilitation services    Transportation Anticipated family or friend will provide       Discharge Needs Assessment    Equipment Currently Used at Home prosthesis    Anticipated Changes Related to Illness none    Discharge  Coordination/Progress Per RN charting Pt is from [facility]. SW following up with [facility]. Pt does live at home with wife. Pt has Cane, Walker and Wheelchair.  Pt denies in financial strain with cost of medication, food or utilities. PCP: KELIN Barrett. SW/CM will continue to follow for discharge needs.               Discharge Plan     Row Name 04/30/23 1005       Plan    Plan Per RN charting Pt is from [facility]. SW following up with [facility]. Pt does live at home with wife. Pt has Cane, Walker and Wheelchair.  Pt denies in financial strain with cost of medication, food or utilities. PCP: KELIN Barrett. SW/CM will continue to follow for discharge needs.              Continued Care and Services - Admitted Since 4/28/2023    Coordination has not been started for this encounter.          Demographic Summary     Row Name 04/30/23 0942       General Information    Admission Type inpatient    Arrived From emergency department    Referral Source admission list    Reason for Consult discharge planning    Preferred Language English       Contact Information    Permission Granted to Share Info With lay caregiver    Contact Information Obtained for lay caregiver       Lay Caregiver Information    Name, Lay Caregiver Yeny Khan    Phone, Lay Caregiver [phone]               Functional Status     Row Name 04/30/23 0943       Functional Status    Usual Activity Tolerance good    Current Activity Tolerance good       Physical Activity    On average, how many days per week do you engage in moderate to strenuous exercise (like a brisk walk)? 0 days    On average, how many minutes do you engage in exercise at this level? 0 min    Number of minutes of exercise per week 0       Assessment of Health Literacy    How often do you have someone help you read hospital materials? Occasionally    How often do you have problems learning about your medical condition because of difficulty understanding written  information? Occasionally    How often do you have a problem understanding what is told to you about your medical condition? Occasionally    How confident are you filling out medical forms by yourself? Quite a bit    Health Literacy Moderate       Functional Status, IADL    Medications independent    Meal Preparation independent    Housekeeping independent    Laundry independent    Shopping independent    IADL Comments Wife is able to help when needed.       Mental Status    General Appearance WDL WDL       Mental Status Summary    Recent Changes in Mental Status/Cognitive Functioning no changes       Employment/    Employment Status retired               Psychosocial    No documentation.                Abuse/Neglect    No documentation.                Legal     Row Name 04/30/23 0944       Financial Resource Strain    How hard is it for you to pay for the very basics like food, housing, medical care, and heating? Not hard       Financial/Legal    Source of Income social security;pension/USP    Application for Public Assistance not applied       Legal    Criminal Activity/Legal Involvement none               Substance Abuse    No documentation.                Patient Forms    No documentation.                   Robina Miller

## 2023-04-30 NOTE — PROGRESS NOTES
[facility]     Cardiology Progress Note    Patient Name: Balbir Khan  : 1955  MRN: [id number]  Primary Care Physician:  Rom Barrett PA-C  Date of admission: 2023    Subjective   Subjective   Chief complaint  Shortness of breath    HPI:  Patient Reports improvement in his shortness of breath.  No chest pain is noted.    Review of Systems   All systems were reviewed and negative except for: Mild shortness of breath.    Objective   Objective     Vitals:   Temp:  [97.3 °F (36.3 °C)-99 °F (37.2 °C)] 97.9 °F (36.6 °C)  Heart Rate:  [69-78] 69  Resp:  [16-18] 18  BP: (109-130)/(63-72) 109/63  Flow (L/min):  [2] 2  Physical Exam   Neck: no JVD, no bruit  Lungs: clear to ausculation bilaterally.  No crackles or wheezes  CV: regular rate and rhythm, no murmur  Ext: no cyanosis, clubbing or edema.  Normal bilateral LE pulses.      Scheduled Meds:aspirin, 81 mg, Oral, BID  atorvastatin, 80 mg, Oral, Nightly  carvedilol, 6.25 mg, Oral, BID With Meals  furosemide, 40 mg, Intravenous, BID  heparin (porcine), 5,000 Units, Subcutaneous, Q8H  insulin lispro, 2-7 Units, Subcutaneous, TID With Meals  lisinopril, 10 mg, Oral, Daily  magnesium oxide, 400 mg, Oral, BID  potassium chloride, 10 mEq, Oral, BID With Meals  pregabalin, 75 mg, Oral, BID  sodium chloride, 10 mL, Intravenous, Q12H      Continuous Infusions:        Result Review    Result Review:  I have personally reviewed the results from the time of this admission to 2023 12:03 EDT and agree with these findings:  [x]  Laboratory  []  Microbiology  [x]  Radiology  [x]  EKG/Telemetry   [x]  Cardiology/Vascular   []  Pathology  []  Old records  []  Other:  Most notable findings include:     CBC        2023    06:00 2023    04:00 2023    09:39 2023    04:33   CBC   WBC 8.82   8.07   8.39   6.50     RBC 2.75   3.08   3.09   2.98     Hemoglobin 7.9   8.8   8.8   8.6     Hematocrit 24.8   28.0   28.1   26.9     MCV 90.2   90.9    90.9   90.3     MCH 28.7   28.6   28.5   28.9     MCHC 31.9   31.4   31.3   32.0     RDW 16.5   16.1   16.1   15.8     Platelets 315   356   367   366       CMP        4/28/2023    04:00 4/28/2023    09:39 4/29/2023    04:33 4/30/2023    04:04   CMP   Glucose 76   138   107   105     BUN 14   13   12   14     Creatinine 0.60   0.61   0.63   0.64     EGFR 106.5   105.9   104.9   104.4     Sodium 138   137   138   140     Potassium 4.0   4.0   3.7   4.2     Chloride 103   100   103   102     Calcium 7.9   8.1   8.0   8.2     Total Protein  5.7   5.2      Albumin  2.3   1.9   2.2     Globulin  3.4   3.3      Total Bilirubin  0.3   0.2      Alkaline Phosphatase  77   73      AST (SGOT)  14   12      ALT (SGPT)  <5   <5      Albumin/Globulin Ratio  0.7   0.6      BUN/Creatinine Ratio 23.3   21.3   19.0   21.9     Anion Gap 7.6   9.1   6.5   4.8        CARDIAC LABS:      Lab 04/28/23  1150 04/28/23  0939 04/28/23  0400   PROBNP  --  24,697.0* 26,050.0*   HSTROP T 153* 164*  --         Assessment & Plan   Assessment / Plan     Brief Patient Summary:  Balbir Khan is a 67 y.o. male who has a history of coronary artery disease status post CABG and severe peripheral arterial disease status post previous below the knee amputation and then recent other side below the knee amputation 10 days ago.  He presents with signs of acute, congestive heart failure.  He has not seen a cardiologist in a long time so we do not know exactly what his ejection fraction has been running since the bypass in 2012.       Active Hospital Problems:  Active Hospital Problems    Diagnosis    • **Acute on chronic congestive heart failure, unspecified heart failure type        Assessment:  1.  Acute, systolic congestive heart failure  2.  History of coronary artery disease  3.  Hypertension  4.  Hyperlipidemia  5.  Severe peripheral arterial disease status post bilateral below the knee amputation.     Plan:   1.  We will start an aspirin 81 mg daily.  He  has not been taking this regularly as an outpatient.  2.  Agree with the Lasix 40 IV twice daily.  Patient had a beta natruretic peptide of 24,000 and a chest x-ray that showed pulmonary edema.  His echocardiogram shows an ejection fraction of about 35±5% with global hypokinesis.  I do not know if his EF is been running this way for a long time and this recent surgery may be he got excessive IV fluids which threw him into this or if this is an acute change in his ejection fraction as he does not follow with a cardiologist.  He is net -8 L since he has been here and his serum creatinine is stable.  3.  We will increase Coreg to 6.25 mg p.o. twice daily and slowly titrate this up as blood pressure tolerates.  He is already on lisinopril.  4.  Continue the statin.  5.  When patient is discharged he is going to be set up with a cardiologist closer to his home.  He is just here at this time for rehab after his BKA.  6.  Patient has minimally elevated, fairly stable troponins more consistent with acute, systolic congestive heart failure.  7.  I would just watch patient's ejection fraction over the next several months.  If it does not improve we could consider ischemic study at that time.  He does have global hypokinesis.  CODE STATUS:   Level Of Support Discussed With: Patient  Code Status (Patient has no pulse and is not breathing): CPR (Attempt to Resuscitate)  Medical Interventions (Patient has pulse or is breathing): Full Support      Electronically signed by Caleb Soto MD, 04/30/23, 12:03 PM EDT.

## 2023-05-01 LAB
ALBUMIN SERPL-MCNC: 2 G/DL (ref 3.5–5.2)
ANION GAP SERPL CALCULATED.3IONS-SCNC: 7 MMOL/L (ref 5–15)
BUN SERPL-MCNC: 15 MG/DL (ref 8–23)
BUN/CREAT SERPL: 25.4 (ref 7–25)
CALCIUM SPEC-SCNC: 8.4 MG/DL (ref 8.6–10.5)
CHLORIDE SERPL-SCNC: 101 MMOL/L (ref 98–107)
CO2 SERPL-SCNC: 31 MMOL/L (ref 22–29)
CREAT SERPL-MCNC: 0.59 MG/DL (ref 0.76–1.27)
EGFRCR SERPLBLD CKD-EPI 2021: 107 ML/MIN/1.73
GLUCOSE BLDC GLUCOMTR-MCNC: 135 MG/DL (ref 70–99)
GLUCOSE BLDC GLUCOMTR-MCNC: 152 MG/DL (ref 70–99)
GLUCOSE BLDC GLUCOMTR-MCNC: 176 MG/DL (ref 70–99)
GLUCOSE BLDC GLUCOMTR-MCNC: 249 MG/DL (ref 70–99)
GLUCOSE SERPL-MCNC: 141 MG/DL (ref 65–99)
MAGNESIUM SERPL-MCNC: 1.7 MG/DL (ref 1.6–2.4)
PHOSPHATE SERPL-MCNC: 2.8 MG/DL (ref 2.5–4.5)
POTASSIUM SERPL-SCNC: 3.9 MMOL/L (ref 3.5–5.2)
SODIUM SERPL-SCNC: 139 MMOL/L (ref 136–145)

## 2023-05-01 PROCEDURE — 25010000002 FUROSEMIDE PER 20 MG: Performed by: INTERNAL MEDICINE

## 2023-05-01 PROCEDURE — 97161 PT EVAL LOW COMPLEX 20 MIN: CPT

## 2023-05-01 PROCEDURE — 99233 SBSQ HOSP IP/OBS HIGH 50: CPT | Performed by: FAMILY MEDICINE

## 2023-05-01 PROCEDURE — 83735 ASSAY OF MAGNESIUM: CPT | Performed by: FAMILY MEDICINE

## 2023-05-01 PROCEDURE — 82948 REAGENT STRIP/BLOOD GLUCOSE: CPT

## 2023-05-01 PROCEDURE — 80069 RENAL FUNCTION PANEL: CPT | Performed by: FAMILY MEDICINE

## 2023-05-01 PROCEDURE — 97165 OT EVAL LOW COMPLEX 30 MIN: CPT

## 2023-05-01 PROCEDURE — 94799 UNLISTED PULMONARY SVC/PX: CPT

## 2023-05-01 PROCEDURE — 25010000002 HEPARIN (PORCINE) PER 1000 UNITS: Performed by: INTERNAL MEDICINE

## 2023-05-01 PROCEDURE — 99232 SBSQ HOSP IP/OBS MODERATE 35: CPT | Performed by: INTERNAL MEDICINE

## 2023-05-01 PROCEDURE — 63710000001 INSULIN LISPRO (HUMAN) PER 5 UNITS: Performed by: INTERNAL MEDICINE

## 2023-05-01 RX ORDER — POTASSIUM CHLORIDE 750 MG/1
20 CAPSULE, EXTENDED RELEASE ORAL
Status: DISCONTINUED | OUTPATIENT
Start: 2023-05-01 | End: 2023-05-04

## 2023-05-01 RX ORDER — FUROSEMIDE 40 MG/1
40 TABLET ORAL
Status: DISCONTINUED | OUTPATIENT
Start: 2023-05-01 | End: 2023-05-07

## 2023-05-01 RX ADMIN — Medication 10 ML: at 09:01

## 2023-05-01 RX ADMIN — FUROSEMIDE 40 MG: 10 INJECTION, SOLUTION INTRAMUSCULAR; INTRAVENOUS at 09:00

## 2023-05-01 RX ADMIN — CARVEDILOL 6.25 MG: 6.25 TABLET, FILM COATED ORAL at 09:00

## 2023-05-01 RX ADMIN — OXYCODONE 5 MG: 5 TABLET ORAL at 05:19

## 2023-05-01 RX ADMIN — OXYCODONE 5 MG: 5 TABLET ORAL at 18:09

## 2023-05-01 RX ADMIN — FUROSEMIDE 40 MG: 40 TABLET ORAL at 18:05

## 2023-05-01 RX ADMIN — POTASSIUM CHLORIDE 20 MEQ: 10 CAPSULE, COATED, EXTENDED RELEASE ORAL at 11:42

## 2023-05-01 RX ADMIN — HEPARIN SODIUM 5000 UNITS: 5000 INJECTION INTRAVENOUS; SUBCUTANEOUS at 22:29

## 2023-05-01 RX ADMIN — ATORVASTATIN CALCIUM 80 MG: 40 TABLET, FILM COATED ORAL at 22:29

## 2023-05-01 RX ADMIN — POTASSIUM CHLORIDE 20 MEQ: 10 CAPSULE, COATED, EXTENDED RELEASE ORAL at 18:05

## 2023-05-01 RX ADMIN — INSULIN LISPRO 3 UNITS: 100 INJECTION, SOLUTION INTRAVENOUS; SUBCUTANEOUS at 11:42

## 2023-05-01 RX ADMIN — PREGABALIN 75 MG: 75 CAPSULE ORAL at 09:00

## 2023-05-01 RX ADMIN — ASPIRIN 81 MG 81 MG: 81 TABLET ORAL at 09:00

## 2023-05-01 RX ADMIN — OXYCODONE 5 MG: 5 TABLET ORAL at 22:36

## 2023-05-01 RX ADMIN — HEPARIN SODIUM 5000 UNITS: 5000 INJECTION INTRAVENOUS; SUBCUTANEOUS at 05:19

## 2023-05-01 RX ADMIN — HYDROCORTISONE ACETATE 1 APPLICATION: 1 CREAM TOPICAL at 09:00

## 2023-05-01 RX ADMIN — CARVEDILOL 6.25 MG: 6.25 TABLET, FILM COATED ORAL at 18:05

## 2023-05-01 RX ADMIN — HEPARIN SODIUM 5000 UNITS: 5000 INJECTION INTRAVENOUS; SUBCUTANEOUS at 13:39

## 2023-05-01 RX ADMIN — ASPIRIN 81 MG 81 MG: 81 TABLET ORAL at 22:30

## 2023-05-01 RX ADMIN — OXYCODONE 5 MG: 5 TABLET ORAL at 13:39

## 2023-05-01 RX ADMIN — PREGABALIN 75 MG: 75 CAPSULE ORAL at 22:29

## 2023-05-01 RX ADMIN — Medication 10 ML: at 22:28

## 2023-05-01 RX ADMIN — MAGNESIUM OXIDE TAB 400 MG (241.3 MG ELEMENTAL MG) 400 MG: 400 (241.3 MG) TAB at 18:05

## 2023-05-01 RX ADMIN — LISINOPRIL 10 MG: 10 TABLET ORAL at 09:00

## 2023-05-01 RX ADMIN — HYDROCORTISONE ACETATE 1 APPLICATION: 1 CREAM TOPICAL at 22:30

## 2023-05-01 RX ADMIN — INSULIN LISPRO 2 UNITS: 100 INJECTION, SOLUTION INTRAVENOUS; SUBCUTANEOUS at 18:05

## 2023-05-01 NOTE — PLAN OF CARE
Goal Outcome Evaluation:  Plan of Care Reviewed With: (P) patient, spouse           Outcome Evaluation: (P) Pt is currently having difficulty with transfers, has decreased strength, decreased balance, and decreased activity tolerance. Pt would benefit from skilled physical therapy services while in hospital. Recommend discharge to inpatient rehab.

## 2023-05-01 NOTE — SIGNIFICANT NOTE
Wound Eval / Progress Noted    REY Ferguson     Patient Name: Balbir Khan  : 1955  MRN: [id number]  Today's Date: 2023                 Admit Date: 2023    Visit Dx:    ICD-10-CM ICD-9-CM   1. Acute on chronic congestive heart failure, unspecified heart failure type  I50.9 428.0   2. Decreased activities of daily living (ADL)  Z78.9 V49.89       Patient Active Problem List   Diagnosis    Acute on chronic congestive heart failure, unspecified heart failure type        Past Medical History:   Diagnosis Date    Diabetes mellitus     Hyperlipidemia     Hypertension     Macular degeneration     Myocardial infarction         Past Surgical History:   Procedure Laterality Date    BELOW KNEE AMPUTATION Bilateral     CATARACT EXTRACTION      CORONARY ARTERY BYPASS GRAFT      KNEE SURGERY Bilateral          Physical Assessment:  Wound 23 1800 Right midline coccyx Pressure Injury (Active)   Wound Image   23   Pressure Injury Stage 3 23   Dressing Appearance open to air 23   Closure None 23   Base non-blanchable;red;yellow;slough 23   Red (%), Wound Tissue Color 40 2340   Yellow (%), Wound Tissue Color 60 2340   Periwound blanchable;redness 23   Periwound Temperature warm 23   Periwound Skin Turgor soft 23   Edges open 2340   Wound Length (cm) 1.1 cm 2340   Wound Width (cm) 1.2 cm 2340   Wound Depth (cm) 0.1 cm 2340   Wound Surface Area (cm^2) 1.32 cm^2 2340   Wound Volume (cm^3) 0.132 cm^3 2340   Drainage Characteristics/Odor serous 23   Drainage Amount scant 23   Care, Wound cleansed with;sterile normal saline 2340   Dressing Care dressing applied;silicone;border dressing;silver impregnated;hydrofiber 23   Periwound Care absorptive dressing applied 23       Wound 23 Right leg Incision  (Active)   Wound Image   05/01/23 0940   Dressing Appearance intact;no drainage 05/01/23 0940   Closure Staples 05/01/23 0940   Base dry;scab;pink 05/01/23 0940   Periwound blanchable;pink 05/01/23 0940   Periwound Temperature warm 05/01/23 0940   Periwound Skin Turgor soft 05/01/23 0940   Edges rolled/closed 05/01/23 0940   Drainage Amount none 05/01/23 0940   Care, Wound cleansed with;sterile normal saline 05/01/23 0940   Dressing Care dressing applied;abdominal pad;non-adherent;petroleum-based;gauze 05/01/23 0940   Periwound Care absorptive dressing applied 05/01/23 0940        Wound Check / Follow-up:  Patient seen today for wound consult. Patient currently lying in bed with eyes open. Existing left bka present that is open to air with well approximated incision line. Prosthetic leg noted at bedside.   New ampuyation to right leg resulting in right BKA with dressing in place. Dressing removed. Staples noted to incision line. Incision is dry with some noted crusting with pink blanchable tissue surrounding. Cleansed with NS and gauze. Recommending daily site care.   Pressure injury stage III noted to right lower gluteal aspect. Wound bed is partially pink with noted yellow slough. Cleansed with NS and gauze. Recommending daily dressing with silver impregnated hydrofiber and silicone border dressing. Medial gluteal aspect with blanchable redness and one small area of tissue loss measuring 0.4x0.2x0.1cm. Likely from either moisture or friction and shearing as it lies within the crease when skin is not spread.  Patient is incontinent of bowel this assessment.   Blanchable redness to bilateral gluteal aspects.   Bed change completed and skin care provided.     Impression: Pressure injury to right gluteal, friction, shearing. Recent amputation to right leg    Short term goals:  Regain skin integrity. Site care. Wraps to assist with forming. Daily dressing changes. Skin protection and moisture prevention along with  pressure reduction.    Lacy Contreras RN    5/1/2023    10:11 EDT

## 2023-05-01 NOTE — PROGRESS NOTES
[facility]     Cardiology Progress Note    Patient Name: Balbir Khan  : 1955  MRN: [id number]  Primary Care Physician:  Rom Barrett PA-C  Date of admission: 2023    Subjective   Subjective   Chief complaint  Shortness of breath    HPI:  Patient Reports his breathing is overall doing pretty well but he does note that he feels better with the oxygen.    Review of Systems   All systems were reviewed and negative except for: Possible mild shortness of breath.    Objective   Objective     Vitals:   Temp:  [97.3 °F (36.3 °C)-98.1 °F (36.7 °C)] 97.3 °F (36.3 °C)  Heart Rate:  [65-75] 68  Resp:  [16-20] 16  BP: ()/(54-70) 137/70  Physical Exam   Neck: no JVD, no bruit  Lungs: clear to ausculation bilaterally.  No crackles or wheezes  CV: regular rate and rhythm, no murmur  Ext: no cyanosis, clubbing or edema.  Normal bilateral LE pulses.      Scheduled Meds:aspirin, 81 mg, Oral, BID  atorvastatin, 80 mg, Oral, Nightly  carvedilol, 6.25 mg, Oral, BID With Meals  furosemide, 40 mg, Intravenous, BID  heparin (porcine), 5,000 Units, Subcutaneous, Q8H  hydrocortisone, 1 application, Topical, Q12H  insulin lispro, 2-7 Units, Subcutaneous, TID With Meals  magnesium oxide, 400 mg, Oral, BID With Meals  potassium chloride, 20 mEq, Oral, TID With Meals  pregabalin, 75 mg, Oral, BID  [START ON 5/3/2023] sacubitril-valsartan, 1 tablet, Oral, Q12H  sodium chloride, 10 mL, Intravenous, Q12H      Continuous Infusions:        Result Review    Result Review:  I have personally reviewed the results from the time of this admission to 2023 09:59 EDT and agree with these findings:  [x]  Laboratory  []  Microbiology  [x]  Radiology  [x]  EKG/Telemetry   [x]  Cardiology/Vascular   []  Pathology  []  Old records  []  Other:  Most notable findings include:     CBC        2023    06:00 2023    04:00 2023    09:39 2023    04:33   CBC   WBC 8.82   8.07   8.39   6.50     RBC 2.75   3.08   3.09    2.98     Hemoglobin 7.9   8.8   8.8   8.6     Hematocrit 24.8   28.0   28.1   26.9     MCV 90.2   90.9   90.9   90.3     MCH 28.7   28.6   28.5   28.9     MCHC 31.9   31.4   31.3   32.0     RDW 16.5   16.1   16.1   15.8     Platelets 315   356   367   366       CMP        4/29/2023    04:33 4/30/2023    04:04 5/1/2023    04:39   CMP   Glucose 107   105   141     BUN 12   14   15     Creatinine 0.63   0.64   0.59     EGFR 104.9   104.4   107.0     Sodium 138   140   139     Potassium 3.7   4.2   3.9     Chloride 103   102   101     Calcium 8.0   8.2   8.4     Total Protein 5.2       Albumin 1.9   2.2   2.0     Globulin 3.3       Total Bilirubin 0.2       Alkaline Phosphatase 73       AST (SGOT) 12       ALT (SGPT) <5       Albumin/Globulin Ratio 0.6       BUN/Creatinine Ratio 19.0   21.9   25.4     Anion Gap 6.5   4.8   7.0        CARDIAC LABS:      Lab 04/28/23  1150 04/28/23  0939 04/28/23  0400   PROBNP  --  24,697.0* 26,050.0*   HSTROP T 153* 164*  --         Assessment & Plan   Assessment / Plan     Brief Patient Summary:  Balbir Khan is a 67 y.o. male who has a history of coronary artery disease status post CABG and severe peripheral arterial disease status post previous below the knee amputation and then recent other side below the knee amputation 10 days ago.  He presents with signs of acute, congestive heart failure.  He has not seen a cardiologist in a long time so we do not know exactly what his ejection fraction has been running since the bypass in 2012.    Active Hospital Problems:  Active Hospital Problems    Diagnosis    • **Acute on chronic congestive heart failure, unspecified heart failure type        Assessment:  1.  Acute, systolic congestive heart failure  2.  History of coronary artery disease  3.  Hypertension  4.  Hyperlipidemia  5.  Severe peripheral arterial disease status post bilateral below the knee amputation.     Plan:   1.  We start an aspirin 81 mg daily.  He has not been taking  this regularly as an outpatient.  2.  Agree with the Lasix 40 IV twice daily.  Patient had a beta natruretic peptide of 24,000 and a chest x-ray that showed pulmonary edema.  His echocardiogram shows an ejection fraction of about 35±5% with global hypokinesis.  I do not know if his EF is been running this way for a long time and this recent surgery may be he got excessive IV fluids which threw him into this or if this is an acute change in his ejection fraction as he does not follow with a cardiologist.    3.  Increased Coreg to 6.25 mg p.o. twice daily and slowly titrate this up as blood pressure tolerates.  We will stop his lisinopril.  In 2 days he is to start Entresto.  I have put the orders in.  4.  Continue the statin.  5.  Feel patient from a cardiac standpoint could probably be discharged.  I would put him on Lasix 40 mg p.o. daily.  If he is going to be around [place] for awhile I would set him up to see Dr. Velasco in the congestive heart failure clinic in 1 week.  Dr. Velasco can follow him in the short-term until he is discharged to go back home from the nursing home.  He is then going to be set up with a cardiologist closer to his home.  He is just here at this time for rehab after his BKA.  6.  Patient has minimally elevated, fairly stable troponins more consistent with acute, systolic congestive heart failure.  7.  We will follow from a distance.  Call with any questions.  7.  I would just watch patient's ejection fraction over the next several months.  If it does not improve we could consider ischemic study at that time.  He does have global hypokinesis.     CODE STATUS:   Level Of Support Discussed With: Patient  Code Status (Patient has no pulse and is not breathing): CPR (Attempt to Resuscitate)  Medical Interventions (Patient has pulse or is breathing): Full Support      Electronically signed by Caleb Soto MD, 05/01/23, 9:59 AM EDT.

## 2023-05-01 NOTE — THERAPY EVALUATION
Patient Name: Balbir Khan  : 1955    MRN: [id number]                              Today's Date: 2023       Admit Date: 2023    Visit Dx:     ICD-10-CM ICD-9-CM   1. Acute on chronic congestive heart failure, unspecified heart failure type  I50.9 428.0   2. Decreased activities of daily living (ADL)  Z78.9 V49.89     Patient Active Problem List   Diagnosis   • Acute on chronic congestive heart failure, unspecified heart failure type     Past Medical History:   Diagnosis Date   • Diabetes mellitus    • Hyperlipidemia    • Hypertension    • Macular degeneration    • Myocardial infarction      Past Surgical History:   Procedure Laterality Date   • BELOW KNEE AMPUTATION Bilateral    • CATARACT EXTRACTION     • CORONARY ARTERY BYPASS GRAFT     • KNEE SURGERY Bilateral       General Information     Row Name 23          OT Time and Intention    Document Type evaluation  -PG     Mode of Treatment individual therapy;occupational therapy  -PG     Row Name 23          General Information    Patient Profile Reviewed yes  Unable to obtain history due to patient's confusion.  Patient reportedly living with his wife prior to this most recent amputation.  -PG     Prior Level of Function max assist:;ADL's;transfer  -PG     Existing Precautions/Restrictions fall  -PG     Barriers to Rehab none identified  -PG     Row Name 23          Occupational Profile    Reason for Services/Referral (Occupational Profile) Patient is a confused 67-year-old male admitted from [facility] rehab with congestive heart failure and recent right below the knee amputation.  Patient is also a left below the knee amputee and wears prosthesis.  Patient now being evaluated by Occupational Therapy due to his recent decline in ADL function.  -PG     Row Name 23          Living Environment    People in Home spouse  -PG     Row Name 23          Cognition    Orientation Status (Cognition) oriented  to;person;verbal cues/prompts needed for orientation  -PG     Row Name 05/01/23 0922          Safety Issues, Functional Mobility    Impairments Affecting Function (Mobility) balance;cognition;strength;endurance/activity tolerance  -     Cognitive Impairments, Mobility Safety/Performance insight into deficits/self-awareness;sequencing abilities;judgment;problem-solving/reasoning  -           User Key  (r) = Recorded By, (t) = Taken By, (c) = Cosigned By    Initials Name Provider Type     Ronaldo Townsend OT Occupational Therapist                 Mobility/ADL's     Row Name 05/01/23 0925          Bed Mobility    Bed Mobility bed mobility (all) activities  -     All Activities, [place] (Bed Mobility) dependent (less than 25% patient effort);maximum assist (25% patient effort)  -     Row Name 05/01/23 0925          Activities of Daily Living    BADL Assessment/Intervention bathing;upper body dressing;lower body dressing;grooming;toileting  -     Row Name 05/01/23 0925          Bathing Assessment/Intervention    [place] Level (Bathing) bathing skills;dependent (less than 25% patient effort)  -     Row Name 05/01/23 0925          Upper Body Dressing Assessment/Training    [place] Level (Upper Body Dressing) upper body dressing skills;dependent (less than 25% patient effort)  -     Row Name 05/01/23 0925          Lower Body Dressing Assessment/Training    [place] Level (Lower Body Dressing) lower body dressing skills;dependent (less than 25% patient effort)  -     Row Name 05/01/23 0925          Grooming Assessment/Training    [place] Level (Grooming) grooming skills;dependent (less than 25% patient effort)  -     Row Name 05/01/23 0925          Toileting Assessment/Training    [place] Level (Toileting) toileting skills;dependent (less than 25% patient effort)  -           User Key  (r) = Recorded By, (t) = Taken By, (c) = Cosigned By    Initials Name Provider Type    PG  Ronaldo Townsend OT Occupational Therapist               Obj/Interventions     [facility] Name 05/01/23 0926          Sensory Assessment (Somatosensory)    Sensory Assessment (Somatosensory) unable/difficult to assess  -PG     [facility] Name 05/01/23 0926          Vision Assessment/Intervention    Visual Impairment/Limitations unable/difficult to assess  -PG     [facility] Name 05/01/23 0926          Range of Motion Comprehensive    General Range of Motion no range of motion deficits identified  -PG     [facility] Name 05/01/23 0926          Strength Comprehensive (MMT)    Comment, General Manual Muscle Testing (MMT) Assessment Right upper extremity 4 -/5, left upper extremity 4/5  -PG     [facility] Name 05/01/23 0926          Motor Skills    Motor Skills coordination;functional endurance  -PG     Coordination WFL  -PG     Functional Endurance Poor plus  -PG           User Key  (r) = Recorded By, (t) = Taken By, (c) = Cosigned By    Initials Name Provider Type    PG Ronaldo Townsend OT Occupational Therapist               Goals/Plan     Row Name 05/01/23 0927          Transfer Goal 1 (OT)    Activity/Assistive Device (Transfer Goal 1, OT) transfers, all  -PG     [place] Level/Cues Needed (Transfer Goal 1, OT) minimum assist (75% or more patient effort)  -PG     Time Frame (Transfer Goal 1, OT) long term goal (LTG);10 days  -PG     [facility] Name 05/01/23 0927          Bathing Goal 1 (OT)    Activity/Device (Bathing Goal 1, OT) bathing skills, all  -PG     [place] Level/Cues Needed (Bathing Goal 1, OT) minimum assist (75% or more patient effort)  -PG     Time Frame (Bathing Goal 1, OT) long term goal (LTG);10 days  -PG     [facility] Name 05/01/23 0927          Dressing Goal 1 (OT)    Activity/Device (Dressing Goal 1, OT) dressing skills, all  -PG     [place]/Cues Needed (Dressing Goal 1, OT) minimum assist (75% or more patient effort)  -PG     Time Frame (Dressing Goal 1, OT) long term goal (LTG);10 days  -PG     [facility] Name 05/01/23 0927           Toileting Goal 1 (OT)    Activity/Device (Toileting Goal 1, OT) toileting skills, all  -PG     [place] Level/Cues Needed (Toileting Goal 1, OT) minimum assist (75% or more patient effort)  -PG     Time Frame (Toileting Goal 1, OT) long term goal (LTG);10 days  -PG     Row Name 05/01/23 0927          Grooming Goal 1 (OT)    Activity/Device (Grooming Goal 1, OT) grooming skills, all  -PG     [place] (Grooming Goal 1, OT) minimum assist (75% or more patient effort)  -PG     Time Frame (Grooming Goal 1, OT) long term goal (LTG);10 days  -PG     Row Name 05/01/23 0927          Strength Goal 1 (OT)    Strength Goal 1 (OT) Patient will improve upper body strength to 4+/5 to support engagement and safety with ADL tasks  -PG     Time Frame (Strength Goal 1, OT) long term goal (LTG);10 days  -PG     Row Name 05/01/23 0927          Problem Specific Goal 1 (OT)    Problem Specific Goal 1 (OT) Patient will improve activity tolerance to fair/fair plus to support independence with ADL tasks  -PG     Time Frame (Problem Specific Goal 1, OT) long term goal (LTG);10 days  -PG     Row Name 05/01/23 0927          Therapy Assessment/Plan (OT)    Planned Therapy Interventions (OT) activity tolerance training;BADL retraining;strengthening exercise;transfer/mobility retraining;patient/caregiver education/training;occupation/activity based interventions  -PG           User Key  (r) = Recorded By, (t) = Taken By, (c) = Cosigned By    Initials Name Provider Type    PG Ronaldo Townsend, OT Occupational Therapist               Clinical Impression     Row Name 05/01/23 0927          Pain Assessment    Pretreatment Pain Rating 0/10 - no pain  -PG     Posttreatment Pain Rating 0/10 - no pain  -PG     Row Name 05/01/23 0927          Plan of Care Review    Plan of Care Reviewed With patient  -PG     Progress no change  -PG     Outcome Evaluation Patient presents with limitations affecting strength, activity tolerance, and balance impacting  patient's ability to return home safely and independently.  The skills of a therapist will be required to safely and effectively implement the following treatment plan to restore maximal level of function  -PG     Row Name 05/01/23 0927          Therapy Assessment/Plan (OT)    Patient/Family Therapy Goal Statement (OT) Unknown  -PG     Rehab Potential (OT) good, to achieve stated therapy goals  -PG     Criteria for Skilled Therapeutic Interventions Met (OT) yes;meets criteria;skilled treatment is necessary  -PG     Therapy Frequency (OT) 5 times/wk  -PG     Row Name 05/01/23 0927          Therapy Plan Review/Discharge Plan (OT)    Anticipated Discharge Disposition (OT) sub acute care setting;extended care facility  -PG           User Key  (r) = Recorded By, (t) = Taken By, (c) = Cosigned By    Initials Name Provider Type    PG Ronaldo Townsend, JOCELIN Occupational Therapist               Outcome Measures     Row Name 05/01/23 0929          How much help from another is currently needed...    Putting on and taking off regular lower body clothing? 1  -PG     Bathing (including washing, rinsing, and drying) 1  -PG     Toileting (which includes using toilet bed pan or urinal) 1  -PG     Putting on and taking off regular upper body clothing 2  -PG     Taking care of personal grooming (such as brushing teeth) 2  -PG     Eating meals 3  -PG     AM-PAC 6 Clicks Score (OT) 10  -PG     Row Name 05/01/23 0929          Functional Assessment    Outcome Measure Options Optimal Instrument;AM-PAC 6 Clicks Daily Activity (OT)  -PG     Row Name 05/01/23 0929          Optimal Instrument    Optimal Instrument Optimal - 3  -PG     Bending/Stooping 5  -PG     Standing 5  -PG     Reaching 3  -PG     From the list, choose the 3 activities you would most like to be able to do without any difficulty Bending/stooping;Standing;Reaching  -PG     Total Score Optimal - 3 13  -PG           User Key  (r) = Recorded By, (t) = Taken By, (c) = Cosigned By     Initials Name Provider Type    PG Ronaldo Townsend OT Occupational Therapist                Occupational Therapy Education     Title: PT OT SLP Therapies (In Progress)     Topic: Occupational Therapy (In Progress)     Point: ADL training (In Progress)     Description:   Instruct learner(s) on proper safety adaptation and remediation techniques during self care or transfers.   Instruct in proper use of assistive devices.              Learning Progress Summary           Patient Acceptance, E,D, NR by PG at 5/1/2023 0930                   Point: Home exercise program (In Progress)     Description:   Instruct learner(s) on appropriate technique for monitoring, assisting and/or progressing therapeutic exercises/activities.              Learning Progress Summary           Patient Acceptance, E,D, NR by PG at 5/1/2023 0930                   Point: Precautions (In Progress)     Description:   Instruct learner(s) on prescribed precautions during self-care and functional transfers.              Learning Progress Summary           Patient Acceptance, E,D, NR by PG at 5/1/2023 0930                   Point: Body mechanics (In Progress)     Description:   Instruct learner(s) on proper positioning and spine alignment during self-care, functional mobility activities and/or exercises.              Learning Progress Summary           Patient Acceptance, E,D, NR by PG at 5/1/2023 0930                               User Key     Initials Effective Dates Name Provider Type Discipline    PG 06/16/21 -  Ronaldo Townsend OT Occupational Therapist OT              OT Recommendation and Plan  Planned Therapy Interventions (OT): activity tolerance training, BADL retraining, strengthening exercise, transfer/mobility retraining, patient/caregiver education/training, occupation/activity based interventions  Therapy Frequency (OT): 5 times/wk  Plan of Care Review  Plan of Care Reviewed With: patient  Progress: no change  Outcome Evaluation: Patient  presents with limitations affecting strength, activity tolerance, and balance impacting patient's ability to return home safely and independently.  The skills of a therapist will be required to safely and effectively implement the following treatment plan to restore maximal level of function     Time Calculation:    Time Calculation- OT     Row Name 05/01/23 0931             Time Calculation- OT    OT Received On 05/01/23  -PG      OT Goal Re-Cert Due Date 05/10/23  -PG         Untimed Charges    OT Eval/Re-eval Minutes 30  -PG         Total Minutes    Untimed Charges Total Minutes 30  -PG       Total Minutes 30  -PG            User Key  (r) = Recorded By, (t) = Taken By, (c) = Cosigned By    Initials Name Provider Type    PG Ronaldo Townsend OT Occupational Therapist              Therapy Charges for Today     Code Description Service Date Service Provider Modifiers Qty    [id number] HC OT EVAL LOW COMPLEXITY 2 5/1/2023 Ronaldo Townsend OT GO 1               Ronaldo Townsend OT  5/1/2023

## 2023-05-01 NOTE — PLAN OF CARE
"Goal Outcome Evaluation:  Plan of Care Reviewed With: patient        Progress: improving  Outcome Evaluation: Alert and oriented x 4, VSS.  Medicated with PRN pain meds x 1 for complaints of pain with relief.  Following fluid restriction without problems,  One episode on incontient diarrhea, stated \"I was dreaming and then I woke up and it wasn't a dream.  Rested in bed with eyes closed most of shift.  Plan to discharge to rehab facility but not [facility].  WCTM  "

## 2023-05-01 NOTE — PLAN OF CARE
Goal Outcome Evaluation:            Patient was seen by wound care today.  VSS.  Spouse at bedside. No acute changes this shift.  Changing lasix to PO and planning to D/C the [facility] tomorrow for voiding trial.  No signs of distress noted at this time.

## 2023-05-01 NOTE — THERAPY EVALUATION
Acute Care - Physical Therapy Initial Evaluation   Marty     Patient Name: Balbir Khan  : 1955  MRN: [id number]  Today's Date: 2023      Visit Dx:     ICD-10-CM ICD-9-CM   1. Acute on chronic congestive heart failure, unspecified heart failure type  I50.9 428.0   2. Decreased activities of daily living (ADL)  Z78.9 V49.89   3. Acute HFrEF (heart failure with reduced ejection fraction)  I50.21 428.21   4. Difficulty in walking  R26.2 719.7     Patient Active Problem List   Diagnosis   • Acute on chronic congestive heart failure, unspecified heart failure type     Past Medical History:   Diagnosis Date   • Diabetes mellitus    • Hyperlipidemia    • Hypertension    • Macular degeneration    • Myocardial infarction      Past Surgical History:   Procedure Laterality Date   • BELOW KNEE AMPUTATION Bilateral    • CATARACT EXTRACTION     • CORONARY ARTERY BYPASS GRAFT     • KNEE SURGERY Bilateral      PT Assessment (last 12 hours)     PT Evaluation and Treatment     Row Name 23 1452          Physical Therapy Time and Intention    Subjective Information complains of;pain (P)   -SW     Document Type evaluation (P)   -SW     Mode of Treatment individual therapy;physical therapy (P)   -SW     Patient Effort good (P)   -SW     Symptoms Noted During/After Treatment none (P)   -SW     Row Name 23 1452          General Information    Patient Profile Reviewed yes (P)   -SW     Patient Observations alert;cooperative;agree to therapy (P)   -SW     Prior Level of Function independent:;w/c or scooter;mod assist:;transfer (P)   -SW     Equipment Currently Used at Home wheelchair (P)   -SW     Row Name 23 1452          Living Environment    Current Living Arrangements home (P)   came from [facility] rehab  -SW     Home Accessibility wheelchair accessible (P)   -SW     People in Home spouse (P)   -SW     Name(s) of People in Home Naila (P)   -SW     Primary Care Provided by self;spouse/significant other (P)    -[facility] Name 05/01/23 1452          Home Use of Assistive/Adaptive Equipment    Equipment Currently Used at Home prosthesis;wheelchair;shower chair;grab bar (P)   has cane and walker that he does not use  -[facility] Name 05/01/23 1452          Pain    Pretreatment Pain Rating 8/10 (P)   -     Posttreatment Pain Rating 8/10 (P)   -     Pain Location - Side/Orientation Right (P)   -     Pain Location - residual limb (P)   -SW     Row Name 05/01/23 1452          Strength (Manual Muscle Testing)    Strength (Manual Muscle Testing) -- (P)   Bilateral LE: grossly 4+/5, except bilateral hip flexion: 4-/5  -[facility] Name 05/01/23 1452          Bed Mobility    Bed Mobility bed mobility (all) activities (P)   -     All Activities, [place] (Bed Mobility) minimum assist (75% patient effort) (P)   -     Assistive Device (Bed Mobility) bed rails;head of bed elevated (P)   -SW     Row Name 05/01/23 1452          Transfers    Transfers other (see comments) (P)   not tested, pt stated he has been too weak to use his L prothesis  -[facility] Name 05/01/23 1452          Gait/Stairs (Locomotion)    Gait/Stairs Locomotion other (see comments) (P)   not tested, pt stated he has been too weak to use his L prothesis  -[facility] Name 05/01/23 1452          Safety Issues, Functional Mobility    Impairments Affecting Function (Mobility) balance;cognition;strength;endurance/activity tolerance (P)   -SW     Row Name 05/01/23 1452          Balance    Balance Assessment sitting dynamic balance (P)   -     Dynamic Sitting Balance standby assist (P)   -     Position, Sitting Balance sitting edge of bed (P)   -SW     Row Name             Residual Limb Assessment 04/28/23 1948 transtibial (below knee), right    Residual Limb Assessment - Properties Group Placement Date: 04/28/23  -CB Placement Time: 1948  -CB Amputation Date: 04/17/23  -CB Location: transtibial (below knee), right  -CB    Retired Residual Limb Assessment  - Properties Group Placement Date: 04/28/23  -CB Placement Time: 1948  -CB Amputation Date: 04/17/23  -CB Location: transtibial (below knee), right  -CB    Retired Residual Limb Assessment - Properties Group Date first assessed: 04/28/23  -CB Time first assessed: 1948  -CB Amputation Date: 04/17/23  -CB Location: transtibial (below knee), right  -CB    Row Name             Residual Limb Assessment 04/28/23 1948 transtibial (below knee), left    Residual Limb Assessment - Properties Group Placement Date: 04/28/23  -CB, unknown  Placement Time: 1948  -CB, unknown  Location: transtibial (below knee), left  -CB    Retired Residual Limb Assessment - Properties Group Placement Date: 04/28/23  -CB, unknown  Placement Time: 1948 -CB, unknown  Location: transtibial (below knee), left  -CB    Retired Residual Limb Assessment - Properties Group Date first assessed: 04/28/23  -CB, unknown  Time first assessed: 1948  -CB, unknown  Location: transtibial (below knee), left  -CB    Row Name             Wound 04/28/23 1800 Right midline coccyx Pressure Injury    Wound - Properties Group Placement Date: 04/28/23  -CZ Placement Time: 1800  -CZ Present on Hospital Admission: Y  -CZ Side: Right  -CZ Orientation: midline  -CZ Location: coccyx  -CZ Primary Wound Type: Pressure inj  -CZ    Retired Wound - Properties Group Placement Date: 04/28/23  -CZ Placement Time: 1800  -CZ Present on Hospital Admission: Y  -CZ Side: Right  -CZ Orientation: midline  -CZ Location: coccyx  -CZ Primary Wound Type: Pressure inj  -CZ    Retired Wound - Properties Group Date first assessed: 04/28/23  -CZ Time first assessed: 1800  -CZ Present on Hospital Admission: Y  -CZ Side: Right  -CZ Location: coccyx  -CZ Primary Wound Type: Pressure inj  -CZ    Row Name             Wound 05/01/23 0940 Right leg Incision    Wound - Properties Group Placement Date: 05/01/23  -FL Placement Time: 0940  -FL Present on Hospital Admission: Y  -FL Side: Right  -FL Location:  leg  -FL Primary Wound Type: Incision  -FL    Retired Wound - Properties Group Placement Date: 05/01/23 -FL Placement Time: 0940  -FL Present on Hospital Admission: Y  -FL Side: Right  -FL Location: leg  -FL Primary Wound Type: Incision  -FL    Retired Wound - Properties Group Date first assessed: 05/01/23 -FL Time first assessed: 0940  -FL Present on Hospital Admission: Y  -FL Side: Right  -FL Location: leg  -FL Primary Wound Type: Incision  -FL    Row Name 05/01/23 1452          Plan of Care Review    Plan of Care Reviewed With patient;spouse (P)   -SW     Outcome Evaluation Pt is currently having difficulty with transfers, has decreased strength, decreased balance, and decreased activity tolerance. Pt would benefit from skilled physical therapy services while in hospital. Recommend discharge to inpatient rehab. (P)   -     Row Name 05/01/23 1452          Positioning and Restraints    Pre-Treatment Position in bed (P)   -SW     Post Treatment Position bed (P)   -SW     In Bed supine;call light within reach;with family/caregiver (P)   -     Row Name 05/01/23 1452          Therapy Assessment/Plan (PT)    Rehab Potential (PT) good, to achieve stated therapy goals (P)   -SW     Criteria for Skilled Interventions Met (PT) skilled treatment is necessary (P)   -SW     Therapy Frequency (PT) daily (P)   -SW     Predicted Duration of Therapy Intervention (PT) 10 days (P)   -SW     Problem List (PT) problems related to;balance;mobility;range of motion (ROM);strength;pain (P)   -SW     Activity Limitations Related to Problem List (PT) unable to ambulate safely;unable to transfer safely (P)   -     Row Name 05/01/23 1452          Therapy Plan Review/Discharge Plan (PT)    Therapy Plan Review (PT) evaluation/treatment results reviewed;participants included;patient;spouse/significant other (P)   -     Row Name 05/01/23 1452          Physical Therapy Goals    Bed Mobility Goal Selection (PT) bed mobility, PT goal 1 (P)    -SW     Transfer Goal Selection (PT) transfer, PT goal 1 (P)   -SW     Strength Goal Selection (PT) strength, PT goal 1 (P)   -     Row Name 05/01/23 1452          Bed Mobility Goal 1 (PT)    Activity/Assistive Device (Bed Mobility Goal 1, PT) bed mobility activities, all (P)   -SW     [place] Level/Cues Needed (Bed Mobility Goal 1, PT) standby assist (P)   -SW     Time Frame (Bed Mobility Goal 1, PT) long term goal (LTG);10 days (P)   -     Row Name 05/01/23 1452          Transfer Goal 1 (PT)    Activity/Assistive Device (Transfer Goal 1, PT) transfers, all (P)   -SW     [place] Level/Cues Needed (Transfer Goal 1, PT) minimum assist (75% or more patient effort) (P)   -SW     Time Frame (Transfer Goal 1, PT) long term goal (LTG);10 days (P)   -     Row Name 05/01/23 1452          Strength Goal 1 (PT)    Strength Goal 1 (PT) Pt will demonstrate bilateral hip flexion strength of 4+/5. (P)   -     Time Frame (Strength Goal 1, PT) long term goal (LTG);10 days (P)   -           User Key  (r) = Recorded By, (t) = Taken By, (c) = Cosigned By    Initials Name Provider Type    Lacy Gruber, RN Registered Nurse    Nichole Escamilla RN Registered Nurse    Naomy Ruiz RN Registered Nurse    Elvira Tavarez, PT Student PT Student                  PT Recommendation and Plan  Anticipated Discharge Disposition (PT): (P) inpatient rehabilitation facility  Planned Therapy Interventions (PT): (P) balance training, bed mobility training, gait training, ROM (range of motion), strengthening, stretching, transfer training, prosthetic fitting/training  Therapy Frequency (PT): (P) daily  Plan of Care Reviewed With: (P) patient, spouse  Outcome Evaluation: (P) Pt is currently having difficulty with transfers, has decreased strength, decreased balance, and decreased activity tolerance. Pt would benefit from skilled physical therapy services while in hospital. Recommend discharge to inpatient  rehab.   Outcome Measures     Row Name 05/01/23 1500             How much help from another person do you currently need...    Turning from your back to your side while in flat bed without using bedrails? 4 (P)   -SW      Moving from lying on back to sitting on the side of a flat bed without bedrails? 3 (P)   -SW      Moving to and from a bed to a chair (including a wheelchair)? 2 (P)   -SW      Standing up from a chair using your arms (e.g., wheelchair, bedside chair)? 2 (P)   -SW      Climbing 3-5 steps with a railing? 1 (P)   -SW      To walk in hospital room? 2 (P)   -SW      AM-PAC 6 Clicks Score (PT) 14 (P)   -SW         Functional Assessment    Outcome Measure Options AM-PAC 6 Clicks Basic Mobility (PT) (P)   -SW            User Key  (r) = Recorded By, (t) = Taken By, (c) = Cosigned By    Initials Name Provider Type    Elvira Tavarez PT Student PT Student                 Time Calculation:    PT Charges     Row Name 05/01/23 1452             Time Calculation    PT Received On 05/01/23 (P)   -SW      PT Goal Re-Cert Due Date 05/10/23 (P)   -SW         Untimed Charges    PT Eval/Re-eval Minutes 40 (P)   -SW         Total Minutes    Untimed Charges Total Minutes 40 (P)   -SW       Total Minutes 40 (P)   -SW            User Key  (r) = Recorded By, (t) = Taken By, (c) = Cosigned By    Initials Name Provider Type     Elvira Garcia PT Student PT Student              Therapy Charges for Today     Code Description Service Date Service Provider Modifiers Qty    [id number] HC PT EVAL LOW COMPLEXITY 3 5/1/2023 Elvira Garcia, PT Student GP 1          PT G-Codes  Outcome Measure Options: (P) AM-PAC 6 Clicks Basic Mobility (PT)  AM-PAC 6 Clicks Score (PT): (P) 14  AM-PAC 6 Clicks Score (OT): 10    Elvira Garcia PT Student  5/1/2023

## 2023-05-01 NOTE — PROGRESS NOTES
[facility]   Hospitalist Progress Note  Date: 2023  Patient Name: Balbir Khan  : 1955  MRN: [id number]  Date of admission: 2023      Subjective   Subjective     Chief Complaint: Follow-up shortness of breath    Summary:Balbir Khan is a 67 y.o. male past medical history of coronary artery disease status post CABG, dyslipidemia, hypertension, type 2 diabetes, vascular disease, recent admission for BKA who presents with shortness of breath. Patient was recently in the hospital at another institution for BKA.  He was sent to [facility] for rehab.  He became short of breath and thought that he needed a blood transfusion.  Patient has no history of heart failure that he knows of.  He has had a CABG with no issues he states that he had a BKA about 10 days ago and started having shortness of breath yesterday.  Result of shortness of breath he came the ER for further evaluation.     In the emergency department the patient's vital signs are as follows temperature is 98.7, pulse is 89, respiratory 17, blood pressure 117/69, 84% on room air.  CBC shows a hemoglobin of 8.8 which is close to his baseline.  CMP shows no abnormalities.  BNP is elevated at 24,000 with a troponin of 164 that down trended to 153.  Chest x-ray shows pulmonary vascular congestion increased interstitial opacities could represent underlying pulmonary edema.  Patient admitted to hospital for acute hypoxic respiratory failure due to heart failure exacerbation.  Patient started on IV diuretics.  Echocardiogram demonstrated EF of 35%.  Cardiology consulted.  Started on aspirin Coreg.  Patient had Raymond placed at [facility] due to urinary retention.  Raymond continued during IV diuresis.  Patient converted to p.o furosemide.  Attempting voiding trial.Cardiology recommends follow up with Dr. Velasco at the heart failure clinic in 1 week and establishing care with a cardiologist closer to home once he is discharged from rehab to monitor  EF.  If EF not improved in 3-6 months could warrant an ischemic work-up.  Discharge planning working on rehab placement as patient does not wish to return to [facility].     Interval Followup: Patient lying in bed resting comfortably with family at the bedside.  Patient indicates shortness of breath is still slowly improving. Sinus rhythm PVCs 60s with 6 beat run of nonsustained V. tach on telemetry review.   Magnesium and potassium slightly low this morning.  -11 L since admission.  No other issues per nursing.    Review of Systems  Constitutional: Negative for fatigue and fever.   HENT: Negative for sore throat and trouble swallowing.    Eyes: Negative for pain and discharge.   Respiratory: Negative for cough and positive shortness of breath.    Cardiovascular: Negative for chest pain and palpitations.   Gastrointestinal: Negative for abdominal pain, nausea and vomiting.   Endocrine: Negative for cold intolerance and heat intolerance.   Genitourinary: Negative for difficulty urinating and dysuria.   Musculoskeletal: Negative for back pain and neck stiffness.   Skin: Negative for color change and rash.   Neurological: Negative for syncope and headaches.   Hematological: Negative for adenopathy.   Psychiatric/Behavioral: Negative for confusion and hallucinations.    Objective   Objective     Vitals:   Temp:  [97.3 °F (36.3 °C)-98.1 °F (36.7 °C)] 97.5 °F (36.4 °C)  Heart Rate:  [64-75] 64  Resp:  [16-20] 16  BP: ()/(54-70) 102/56  Flow (L/min):  [2] 2  Physical Exam   Gen. well-developed appearing stated age in no acute distress  HEENT: Normocephalic atraumatic moist membranes pupils equal round reactive light, no scleral icterus no conjunctival injection  Cardiovascular: regular rate and rhythm no murmurs rubs or gallops S1-S2, 1+ bilateral lower extremity edema appreciated  Pulmonary: Crackles bilateral posterior lung fields, no wheezes or rhonchi symmetric chest expansion, unlabored, no conversational dyspnea  appreciated  Gastrointestinal: Soft nontender nondistended positive bowel sounds all 4 quadrants no rebound or guarding  Musculoskeletal: No clubbing cyanosis, warm and well-perfused, status post bilateral BKAs  Skin: Clean dry without rashs  Neuro: Cranial nerves II through XII intact grossly no sensorimotor deficits appreciated bilateral upper and lower extremities  Psych: Patient is calm cooperative and appropriate with exam not responding to internal stimuli  : Raymond catheter draining clear urine no bladder distention no suprapubic tenderness    Result Review    Result Review:  I have personally reviewed these results and agree with these findings:  [x]  Laboratory  LAB RESULTS:      Lab 04/29/23 0433 04/28/23  0939 04/28/23  0400   WBC 6.50 8.39 8.07   HEMOGLOBIN 8.6* 8.8* 8.8*   HEMATOCRIT 26.9* 28.1* 28.0*   PLATELETS 366 367 356   NEUTROS ABS 4.62 6.72 5.90   IMMATURE GRANS (ABS) 0.02 0.03 0.03   LYMPHS ABS 1.20 1.07 1.35   MONOS ABS 0.47 0.45 0.56   EOS ABS 0.15 0.10 0.19   MCV 90.3 90.9 90.9         Lab 05/01/23 0439 04/30/23  0404 04/29/23 0433 04/28/23  0939 04/28/23  0400   SODIUM 139 140 138 137 138   POTASSIUM 3.9 4.2 3.7 4.0 4.0   CHLORIDE 101 102 103 100 103   CO2 31.0* 33.2* 28.5 27.9 27.4   ANION GAP 7.0 4.8* 6.5 9.1 7.6   BUN 15 14 12 13 14   CREATININE 0.59* 0.64* 0.63* 0.61* 0.60*   EGFR 107.0 104.4 104.9 105.9 106.5   GLUCOSE 141* 105* 107* 138* 76   CALCIUM 8.4* 8.2* 8.0* 8.1* 7.9*   MAGNESIUM 1.7 1.6 1.4*  --   --    PHOSPHORUS 2.8 2.8  --   --   --          Lab 05/01/23 0439 04/30/23 0404 04/29/23 0433 04/28/23  0939   TOTAL PROTEIN  --   --  5.2* 5.7*   ALBUMIN 2.0* 2.2* 1.9* 2.3*   GLOBULIN  --   --  3.3 3.4   ALT (SGPT)  --   --  <5 <5   AST (SGOT)  --   --  12 14   BILIRUBIN  --   --  0.2 0.3   ALK PHOS  --   --  73 77         Lab 04/28/23  1150 04/28/23  0939 04/28/23  0400   PROBNP  --  24,697.0* 26,050.0*   HSTROP T 153* 164*  --                  Brief Urine Lab Results   (Last result in the past 365 days)      Color   Clarity   Blood   Leuk Est   Nitrite   Protein   CREAT   Urine HCG        04/23/23 1000 Yellow   Clear   Negative   Trace   Negative   Trace               Microbiology Results (last 10 days)     Procedure Component Value - Date/Time    Urine Culture [[id number]]  (Abnormal)  (Susceptibility) Collected: 04/23/23 1000    Lab Status: Final result Specimen: Urine, Clean Catch Updated: 04/25/23 0051     Urine Culture 25,000 CFU/mL Pseudomonas aeruginosa    Narrative:      Colonization of the urinary tract without infection is common. Treatment is discouraged unless the patient is symptomatic, pregnant, or undergoing an invasive urologic procedure.    Susceptibility      Pseudomonas aeruginosa      YAHIR      Cefepime Susceptible      Ceftazidime Susceptible      Ciprofloxacin Susceptible      Gentamicin Susceptible      Levofloxacin Susceptible      Piperacillin + Tazobactam Susceptible                                 [x]  Microbiology  [x]  Radiology  XR Chest 1 View    Result Date: 4/28/2023    1. Pulmonary vascular congestion and increased interstitial opacities which could represent underlying pulmonary edema.  Asymmetric opacity on the right may represent superimposed pneumonia, atelectasis and/or asymmetric edema.  There appears to be at least a small right-sided pleural effusion       ABBEY BENITO MD       Electronically Signed and Approved By: ABBEY BENITO MD on 4/28/2023 at 10:20               [x]  EKG/Telemetry   [x]  Cardiology/Vascular    Transthoracic echocardiogram 4/28/2023  •  Global hypokinesis with ejection fraction estimated at 35±5%.  •  The left ventricular cavity is mildly dilated.  •  Left ventricular diastolic function is consistent with (grade I) impaired relaxation and age.  •  The right atrial cavity is mildly  dilated.  The left atrial cavity is moderately dilated.  •  Moderate mitral valve regurgitation is present.  •  Estimated right  ventricular systolic pressure from tricuspid regurgitation is markedly elevated (>55 mmHg).  •  Mild to moderate tricuspid regurgitation.  •  Pleural effusion is noted.    []  Pathology  []  Old records  [x]  Other:  Scheduled Meds:aspirin, 81 mg, Oral, BID  atorvastatin, 80 mg, Oral, Nightly  carvedilol, 6.25 mg, Oral, BID With Meals  furosemide, 40 mg, Intravenous, BID  heparin (porcine), 5,000 Units, Subcutaneous, Q8H  hydrocortisone, 1 application, Topical, Q12H  insulin lispro, 2-7 Units, Subcutaneous, TID With Meals  magnesium oxide, 400 mg, Oral, BID With Meals  potassium chloride, 20 mEq, Oral, TID With Meals  pregabalin, 75 mg, Oral, BID  [START ON 5/3/2023] sacubitril-valsartan, 1 tablet, Oral, Q12H  sodium chloride, 10 mL, Intravenous, Q12H      Continuous Infusions:   PRN Meds:.•  acetaminophen  •  dextrose  •  dextrose  •  glucagon (human recombinant)  •  oxyCODONE  •  sodium chloride  •  sodium chloride  •  sodium chloride      Assessment & Plan   Assessment / Plan     Assessment/Plan:  Acute congestive heart failure with reduced ejection fraction  Acute hypoxic respiratory failure  Recent BKA  Type 2 diabetes mellitus  Coronary artery disease status post CABG  Elevated troponin likely due to demand ischemia from heart failure exacerbation  Hypomagnesemia  Hypokalemia  Urinary retention        Patient admitted for further evaluation and treatment  Cardiology consulted thank you for your assistance  Change furosemide to 40 mg p.o. twice daily  Continue strict I's and O's  Continue daily weights  Continue Coreg and titrate per cardiology  Continue lisinopril  Continue supplemental nasal cannula oxygen titrated keep sats greater than 90%  Continue sliding-scale insulin  Continue aspirin  Continue atorvastatin  Continue to replace magnesium and potassium   Attempt voiding trial in a.m.  Consult physical therapy occupational therapy to evaluate and treat  Further inpatient orders recommendations pending  clinical course      Discussed plan with bedside RN.    Disposition: Inpatient rehab to follow-up with Dr. Velasco at heart failure clinic in 1 week.  Patient to establish with cardiologist closer to home once discharged from rehab.      DVT prophylaxis:  Medical DVT prophylaxis orders are present.    CODE STATUS:   Level Of Support Discussed With: Patient  Code Status (Patient has no pulse and is not breathing): CPR (Attempt to Resuscitate)  Medical Interventions (Patient has pulse or is breathing): Full Support

## 2023-05-01 NOTE — CONSULTS
Nutrition Services    Patient Name: Balbir Khan  YOB: 1955  MRN: [id number]  Admission date: 4/28/2023      CLINICAL NUTRITION ASSESSMENT      Reason for Assessment  Identified at risk by screening criteria     H&P:    Past Medical History:   Diagnosis Date   • Diabetes mellitus    • Hyperlipidemia    • Hypertension    • Macular degeneration    • Myocardial infarction         Current Problems:   Active Hospital Problems    Diagnosis    • **Acute on chronic congestive heart failure, unspecified heart failure type         Nutrition/Diet History         Narrative     Patient reviewed by RD related to wounds.  Pressure injury (stage 3) noted to coccyx/gluteal area.  Recent right BKA.      Patient has used Dioni in the past and is agreeable to adding again this admission.       Anthropometrics        Current Height, Weight    Weight: 66.3 kg (146 lb 2.6 oz)   Current BMI There is no height or weight on file to calculate BMI.       Weight Hx  Wt Readings from Last 30 Encounters:   05/01/23 0555 66.3 kg (146 lb 2.6 oz)   04/30/23 0444 75.3 kg (166 lb 0.1 oz)   04/29/23 0600 77.4 kg (170 lb 10.2 oz)   04/28/23 0916 77.7 kg (171 lb 4.8 oz)            Wt Change Observation -11.4 kg since admission  Consistent with -10.8L fluid balance     Estimated/Assessed Needs       Energy Requirements 30 kcal/kg    EST Needs (kcal/day) 1989 kcal        Protein Requirements 1.2-1.5 g/kg    EST Daily Needs (g/day) 80-99 g        Fluid Requirements 30 ml/kg     Estimated Needs (mL/day) 1989 ml      Labs/Medications         Pertinent Labs Reviewed.   Results from last 7 days   Lab Units 05/01/23  0439 04/30/23  0404 04/29/23  0433 04/28/23  0939   SODIUM mmol/L 139 140 138 137   POTASSIUM mmol/L 3.9 4.2 3.7 4.0   CHLORIDE mmol/L 101 102 103 100   CO2 mmol/L 31.0* 33.2* 28.5 27.9   BUN mg/dL 15 14 12 13   CREATININE mg/dL 0.59* 0.64* 0.63* 0.61*   CALCIUM mg/dL 8.4* 8.2* 8.0* 8.1*   BILIRUBIN mg/dL  --   --  0.2 0.3   ALK PHOS  U/L  --   --  73 77   ALT (SGPT) U/L  --   --  <5 <5   AST (SGOT) U/L  --   --  12 14   GLUCOSE mg/dL 141* 105* 107* 138*     Results from last 7 days   Lab Units 05/01/23  0439 04/30/23  0404 04/30/23  0404 04/29/23  0433   MAGNESIUM mg/dL 1.7  --  1.6 1.4*   PHOSPHORUS mg/dL 2.8   < > 2.8  --    HEMOGLOBIN g/dL  --   --   --  8.6*   HEMATOCRIT %  --   --   --  26.9*    < > = values in this interval not displayed.     No results found for: COVID19  No results found for: HGBA1C      Pertinent Medications Reviewed.     Current Nutrition Orders & Evaluation of Intake       Oral Nutrition     Current PO Diet Diet: Cardiac Diets, Diabetic Diets, Fluid Restriction (240 mL/tray) Diets; Low Sodium (2g); Consistent Carbohydrate; 2000 mL/day; Texture: Regular Texture (IDDSI 7); Fluid Consistency: Thin (IDDSI 0)   Supplement No active supplement orders       Malnutrition Severity Assessment                Nutrition Diagnosis         Nutrition Dx Problem 1 Increased nutrient needs related to increased demand for protein for wound healing as evidenced by stage 3 pressure injury, recent BKA.        Nutrition Intervention         Dioni BID     Medical Nutrition Therapy/Nutrition Education          Learner     Readiness Patient and Family  Acceptance     Method     Response Explanation  Verbalizes understanding     Monitor/Evaluation        Monitor Per protocol, PO intake, Supplement intake, Pertinent labs, Weight       Nutrition Discharge Plan         Recommend to continue Dioni until wounds are healed.        Electronically signed by:  Kathleen Thomas RD  05/01/23 15:41 EDT

## 2023-05-02 LAB
ALBUMIN SERPL-MCNC: 2.2 G/DL (ref 3.5–5.2)
ANION GAP SERPL CALCULATED.3IONS-SCNC: 5.5 MMOL/L (ref 5–15)
BUN SERPL-MCNC: 14 MG/DL (ref 8–23)
BUN/CREAT SERPL: 22.6 (ref 7–25)
CALCIUM SPEC-SCNC: 8 MG/DL (ref 8.6–10.5)
CHLORIDE SERPL-SCNC: 102 MMOL/L (ref 98–107)
CO2 SERPL-SCNC: 30.5 MMOL/L (ref 22–29)
CREAT SERPL-MCNC: 0.62 MG/DL (ref 0.76–1.27)
EGFRCR SERPLBLD CKD-EPI 2021: 105.4 ML/MIN/1.73
GLUCOSE BLDC GLUCOMTR-MCNC: 121 MG/DL (ref 70–99)
GLUCOSE BLDC GLUCOMTR-MCNC: 174 MG/DL (ref 70–99)
GLUCOSE BLDC GLUCOMTR-MCNC: 199 MG/DL (ref 70–99)
GLUCOSE BLDC GLUCOMTR-MCNC: 255 MG/DL (ref 70–99)
GLUCOSE SERPL-MCNC: 133 MG/DL (ref 65–99)
PHOSPHATE SERPL-MCNC: 2.4 MG/DL (ref 2.5–4.5)
POTASSIUM SERPL-SCNC: 4.3 MMOL/L (ref 3.5–5.2)
SODIUM SERPL-SCNC: 138 MMOL/L (ref 136–145)
WHOLE BLOOD HOLD SPECIMEN: NORMAL

## 2023-05-02 PROCEDURE — 97530 THERAPEUTIC ACTIVITIES: CPT

## 2023-05-02 PROCEDURE — 99233 SBSQ HOSP IP/OBS HIGH 50: CPT | Performed by: INTERNAL MEDICINE

## 2023-05-02 PROCEDURE — 82948 REAGENT STRIP/BLOOD GLUCOSE: CPT

## 2023-05-02 PROCEDURE — 63710000001 INSULIN LISPRO (HUMAN) PER 5 UNITS: Performed by: INTERNAL MEDICINE

## 2023-05-02 PROCEDURE — 94799 UNLISTED PULMONARY SVC/PX: CPT

## 2023-05-02 PROCEDURE — 80069 RENAL FUNCTION PANEL: CPT | Performed by: FAMILY MEDICINE

## 2023-05-02 PROCEDURE — 25010000002 HEPARIN (PORCINE) PER 1000 UNITS: Performed by: INTERNAL MEDICINE

## 2023-05-02 RX ORDER — FENTANYL/ROPIVACAINE/NS/PF 2-625MCG/1
15 PLASTIC BAG, INJECTION (ML) EPIDURAL ONCE
Status: COMPLETED | OUTPATIENT
Start: 2023-05-02 | End: 2023-05-02

## 2023-05-02 RX ADMIN — HYDROCORTISONE ACETATE 1 APPLICATION: 1 CREAM TOPICAL at 21:47

## 2023-05-02 RX ADMIN — PREGABALIN 75 MG: 75 CAPSULE ORAL at 08:47

## 2023-05-02 RX ADMIN — HEPARIN SODIUM 5000 UNITS: 5000 INJECTION INTRAVENOUS; SUBCUTANEOUS at 13:41

## 2023-05-02 RX ADMIN — OXYCODONE 5 MG: 5 TABLET ORAL at 16:27

## 2023-05-02 RX ADMIN — PREGABALIN 75 MG: 75 CAPSULE ORAL at 21:45

## 2023-05-02 RX ADMIN — CARVEDILOL 6.25 MG: 6.25 TABLET, FILM COATED ORAL at 17:43

## 2023-05-02 RX ADMIN — CARVEDILOL 6.25 MG: 6.25 TABLET, FILM COATED ORAL at 08:47

## 2023-05-02 RX ADMIN — POTASSIUM CHLORIDE 20 MEQ: 10 CAPSULE, COATED, EXTENDED RELEASE ORAL at 08:47

## 2023-05-02 RX ADMIN — POTASSIUM CHLORIDE 20 MEQ: 10 CAPSULE, COATED, EXTENDED RELEASE ORAL at 17:43

## 2023-05-02 RX ADMIN — Medication 10 ML: at 08:48

## 2023-05-02 RX ADMIN — Medication 10 ML: at 21:47

## 2023-05-02 RX ADMIN — ASPIRIN 81 MG 81 MG: 81 TABLET ORAL at 08:47

## 2023-05-02 RX ADMIN — FUROSEMIDE 40 MG: 40 TABLET ORAL at 08:47

## 2023-05-02 RX ADMIN — INSULIN LISPRO 2 UNITS: 100 INJECTION, SOLUTION INTRAVENOUS; SUBCUTANEOUS at 18:33

## 2023-05-02 RX ADMIN — ATORVASTATIN CALCIUM 80 MG: 40 TABLET, FILM COATED ORAL at 21:46

## 2023-05-02 RX ADMIN — FUROSEMIDE 40 MG: 40 TABLET ORAL at 17:43

## 2023-05-02 RX ADMIN — POTASSIUM CHLORIDE 20 MEQ: 10 CAPSULE, COATED, EXTENDED RELEASE ORAL at 11:26

## 2023-05-02 RX ADMIN — INSULIN LISPRO 2 UNITS: 100 INJECTION, SOLUTION INTRAVENOUS; SUBCUTANEOUS at 11:26

## 2023-05-02 RX ADMIN — HYDROCORTISONE ACETATE 1 APPLICATION: 1 CREAM TOPICAL at 08:48

## 2023-05-02 RX ADMIN — POTASSIUM PHOSPHATE, MONOBASIC AND POTASSIUM PHOSPHATE, DIBASIC 15 MMOL: 224; 236 INJECTION, SOLUTION, CONCENTRATE INTRAVENOUS at 13:41

## 2023-05-02 RX ADMIN — HEPARIN SODIUM 5000 UNITS: 5000 INJECTION INTRAVENOUS; SUBCUTANEOUS at 06:10

## 2023-05-02 RX ADMIN — ASPIRIN 81 MG 81 MG: 81 TABLET ORAL at 21:47

## 2023-05-02 RX ADMIN — OXYCODONE 5 MG: 5 TABLET ORAL at 11:26

## 2023-05-02 RX ADMIN — MAGNESIUM OXIDE TAB 400 MG (241.3 MG ELEMENTAL MG) 400 MG: 400 (241.3 MG) TAB at 08:47

## 2023-05-02 RX ADMIN — OXYCODONE 5 MG: 5 TABLET ORAL at 21:46

## 2023-05-02 RX ADMIN — HEPARIN SODIUM 5000 UNITS: 5000 INJECTION INTRAVENOUS; SUBCUTANEOUS at 21:45

## 2023-05-02 RX ADMIN — OXYCODONE 5 MG: 5 TABLET ORAL at 04:12

## 2023-05-02 RX ADMIN — MAGNESIUM OXIDE TAB 400 MG (241.3 MG ELEMENTAL MG) 400 MG: 400 (241.3 MG) TAB at 17:43

## 2023-05-02 NOTE — PLAN OF CARE
Goal Outcome Evaluation:   Pt is alert and oriented.  Pt rested well with no complaints.  Pt did receive pain medication for pain in right extremity.  LEMUEL HAHN RN

## 2023-05-02 NOTE — PLAN OF CARE
Goal Outcome Evaluation:               Patient dacosta removed today.  Patient was bladder scanned after dacosta was removed and had greater than 900 in bladder.  Patient was straight cath and got 850.  Wound care was provided.  VSS.  No acute changes this shift.  Waiting precert for rehab.  No signs of distress noted at this time.

## 2023-05-02 NOTE — PROGRESS NOTES
[facility]   Hospitalist Progress Note  Date: 2023  Patient Name: Balbir Khan  : 1955  MRN: [id number]  Date of admission: 2023  Consultants:   -Cardiology: Dr. Caleb Soto    Subjective   Subjective     Chief Complaint: Shortness of breath    Summary:   Balbir Khan is a 67 y.o. male with CAD s/p CABG, dyslipidemia, hypertension, type 2 diabetes mellitus, vascular disease and history of BKA who presented with complaints of shortness of breath.  Patient found to be hypoxic in the ED.  Labs significant for elevated BNP and troponin.  CXR showed pulmonary vascular congestion.  Hospitalist service contacted for further evaluation and management.  IV diuresis started.  Cardiology consulted.  Echo obtained, EF: 35%.  Patient started on aspirin and Coreg.  Prior to admission Raymond catheter had been placed at [facility] due to urinary retention, this was continued during admission.  Patient transition to oral diuretic therapy.  Plan for patient to start Entresto tomorrow per cardiology service.    Interval Followup:   No acute events.  Patient denies any chest pain or worsening shortness of breath.  Patient working with PT/OT services.  Interested in rehab placement.  Nursing with no additional acute issues to report.    Pain Medication:   -Oxycodone    Review of Systems   All systems reviewed and negative unless stated otherwise under subjective.    Objective   Objective     Vitals:   Temp:  [97.5 °F (36.4 °C)-98.2 °F (36.8 °C)] 97.7 °F (36.5 °C)  Heart Rate:  [65-73] 65  Resp:  [12-18] 18  BP: ()/(51-68) 96/51  Flow (L/min):  [2] 2  Physical Exam   Gen: No acute distress, Conversant, Pleasant, sitting up in bed  Resp: Good aeration, equal chest rise bilaterally, bilateral crackles noted  Card: RRR, No m/r/g  Abd: Soft, Nontender, Nondistended, + bowel sounds    Result Review    Result Review:  I have personally reviewed the results as below and agree with these findings:  []  Laboratory:    CMP        4/30/2023    04:04 5/1/2023    04:39 5/2/2023    05:20   CMP   Glucose 105   141   133     BUN 14   15   14     Creatinine 0.64   0.59   0.62     EGFR 104.4   107.0   105.4     Sodium 140   139   138     Potassium 4.2   3.9   4.3     Chloride 102   101   102     Calcium 8.2   8.4   8.0     Albumin 2.2   2.0   2.2     BUN/Creatinine Ratio 21.9   25.4   22.6     Anion Gap 4.8   7.0   5.5       CBC        4/24/2023    06:00 4/28/2023    04:00 4/28/2023    09:39 4/29/2023    04:33   CBC   WBC 8.82   8.07   8.39   6.50     RBC 2.75   3.08   3.09   2.98     Hemoglobin 7.9   8.8   8.8   8.6     Hematocrit 24.8   28.0   28.1   26.9     MCV 90.2   90.9   90.9   90.3     MCH 28.7   28.6   28.5   28.9     MCHC 31.9   31.4   31.3   32.0     RDW 16.5   16.1   16.1   15.8     Platelets 315   356   367   366        Phosphorus low.    []  Microbiology:   []  Radiology:   [x]  EKG/Telemetry:    []  Cardiology/Vascular:    []  Pathology:  []  Old records:  []  Other:    Assessment & Plan   Assessment / Plan     Assessment:  Acute heart failure with reduced ejection fraction, unknown chronicity  Acute hypoxic respiratory failure  Recent BKA  Type 2 diabetes mellitus with hyperglycemia  CAD s/p CABG  Elevated troponin likely due to demand ischemia from heart failure exacerbation  Hypomagnesemia  Hypokalemia  Hypophosphatemia  Urinary retention    Plan:  -Cardiology consulted and following, appreciate assistance and recommendations in the care of this patient.  -Continue Lasix 40 mg oral twice daily  -Continue Coreg  -Discussed management with cardiology service.  Plan to start Entresto tomorrow (05/03/2023) plan for patient to have close outpatient follow-up in heart failure clinic after discharge  -Continue lisinopril  -Continue aspirin and atorvastatin  -Replace phosphorus IV  -Continue supplemental O2 to maintain sats greater than 90%, wean as tolerated  -No change to insulin regimen  -Patient working with PT/OT  services and interested in rehab placement time of discharge.  Social work aware  -Given continued treatment for heart failure with reduced ejection fraction and hypoxic respiratory failure patient continues to require monitored level of care in the hospital  -Will monitor electrolytes and renal function with BMP and magnesium level in the AM  -Will monitor WBC and Hgb with CBC in the AM  -Clinical course will dictate further management     DVT Prophylaxis: Heparin  Diet: Cardiac/Diabetic  Dispo: PT/OT consulted.  Social work making referrals  Code Status: Full code     Personally reviewed patients labs and imaging, discussed with patient and nurse at bedside. Discussed management with the following consultants: Cardiology.     Part of this note may be an electronic transcription/translation of spoken language to printed text using the Dragon dictation system.    DVT prophylaxis:  Medical DVT prophylaxis orders are present.    CODE STATUS:   Level Of Support Discussed With: Patient  Code Status (Patient has no pulse and is not breathing): CPR (Attempt to Resuscitate)  Medical Interventions (Patient has pulse or is breathing): Full Support      Electronically signed by Raleigh Mariscal MD, 05/02/23, 12:40 PM EDT.

## 2023-05-02 NOTE — THERAPY TREATMENT NOTE
Acute Care - Physical Therapy Treatment Note  [facility]     Patient Name: Balbir Khan  : 1955  MRN: [id number]  Today's Date: 2023      Visit Dx:     ICD-10-CM ICD-9-CM   1. Acute on chronic congestive heart failure, unspecified heart failure type  I50.9 428.0   2. Decreased activities of daily living (ADL)  Z78.9 V49.89   3. Acute HFrEF (heart failure with reduced ejection fraction)  I50.21 428.21   4. Difficulty in walking  R26.2 719.7     Patient Active Problem List   Diagnosis   • Acute on chronic congestive heart failure, unspecified heart failure type     Past Medical History:   Diagnosis Date   • Diabetes mellitus    • Hyperlipidemia    • Hypertension    • Macular degeneration    • Myocardial infarction      Past Surgical History:   Procedure Laterality Date   • BELOW KNEE AMPUTATION Bilateral    • CATARACT EXTRACTION     • CORONARY ARTERY BYPASS GRAFT     • KNEE SURGERY Bilateral      PT Assessment (last 12 hours)     PT Evaluation and Treatment     Row Name 23 1539          Physical Therapy Time and Intention    Subjective Information complains of;weakness  -RH     Document Type therapy note (daily note)  -     Mode of Treatment physical therapy;individual therapy  -     Patient Effort fair  -RH     Comment Pt reports his LLE prosthesis does not function for him.  -     Row Name 23 1539          Pain    Additional Documentation Pain Scale: FACES Pre/Post-Treatment (Group)  -     Row Name 23 1539          Pain Scale: FACES Pre/Post-Treatment    Pain: FACES Scale, Pretreatment 0-->no hurt  -     Posttreatment Pain Rating 0-->no hurt  -     Row Name 23 1539          Bed Mobility    Bed Mobility supine-sit-supine  -     All Activities, [place] (Bed Mobility) minimum assist (75% patient effort)  -RH     Supine-Sit-Supine [place] (Bed Mobility) minimum assist (75% patient effort)  -     Bed Mobility, Safety Issues decreased use of legs for  bridging/pushing  -     Assistive Device (Bed Mobility) bed rails  -     Comment, (Bed Mobility) Pt maintains sitting with CGA.  -     Row Name 05/02/23 1539          Motor Skills    Therapeutic Exercise hip;knee;ankle  -     Row Name 05/02/23 1539          Hip (Therapeutic Exercise)    Hip Strengthening (Therapeutic Exercise) marching while seated;sitting;10 repetitions;2 sets;bilateral;aBduction;aDduction  -     Row Name 05/02/23 1539          Knee (Therapeutic Exercise)    Knee (Therapeutic Exercise) strengthening exercise;isometric exercises  -     Knee Isometrics (Therapeutic Exercise) bilateral;quad sets;supine;10 repetitions;2 sets  -     Knee Strengthening (Therapeutic Exercise) LAQ (long arc quad);bilateral;sitting;10 repetitions;2 sets  -     Row Name 05/02/23 1539          Ankle (Therapeutic Exercise)    Ankle (Therapeutic Exercise) AROM (active range of motion)  -     Row Name             Residual Limb Assessment 04/28/23 1948 transtibial (below knee), right    Residual Limb Assessment - Properties Group Placement Date: 04/28/23  -CB Placement Time: 1948  -CB Amputation Date: 04/17/23  -CB Location: transtibial (below knee), right  -CB    Retired Residual Limb Assessment - Properties Group Placement Date: 04/28/23  -CB Placement Time: 1948  -CB Amputation Date: 04/17/23  -CB Location: transtibial (below knee), right  -CB    Retired Residual Limb Assessment - Properties Group Date first assessed: 04/28/23  -CB Time first assessed: 1948  -CB Amputation Date: 04/17/23  -CB Location: transtibial (below knee), right  -CB    Row Name             Residual Limb Assessment 04/28/23 1948 transtibial (below knee), left    Residual Limb Assessment - Properties Group Placement Date: 04/28/23  -CB, unknown  Placement Time: 1948  -CB, unknown  Location: transtibial (below knee), left  -CB    Retired Residual Limb Assessment - Properties Group Placement Date: 04/28/23  -CB, unknown  Placement Time:  1948  -CB, unknown  Location: transtibial (below knee), left  -CB    Retired Residual Limb Assessment - Properties Group Date first assessed: 04/28/23  -CB, unknown  Time first assessed: 1948  -CB, unknown  Location: transtibial (below knee), left  -CB    Row Name             Wound 04/28/23 1800 Right midline coccyx Pressure Injury    Wound - Properties Group Placement Date: 04/28/23  -CZ Placement Time: 1800  -CZ Present on Hospital Admission: Y  -CZ Side: Right  -CZ Orientation: midline  -CZ Location: coccyx  -CZ Primary Wound Type: Pressure inj  -CZ    Retired Wound - Properties Group Placement Date: 04/28/23  -CZ Placement Time: 1800  -CZ Present on Hospital Admission: Y  -CZ Side: Right  -CZ Orientation: midline  -CZ Location: coccyx  -CZ Primary Wound Type: Pressure inj  -CZ    Retired Wound - Properties Group Date first assessed: 04/28/23  -CZ Time first assessed: 1800  -CZ Present on Hospital Admission: Y  -CZ Side: Right  -CZ Location: coccyx  -CZ Primary Wound Type: Pressure inj  -CZ    Row Name             Wound 05/01/23 0940 Right leg Incision    Wound - Properties Group Placement Date: 05/01/23  -FL Placement Time: 0940  -FL Present on Hospital Admission: Y  -FL Side: Right  -FL Location: leg  -FL Primary Wound Type: Incision  -FL    Retired Wound - Properties Group Placement Date: 05/01/23  -FL Placement Time: 0940  -FL Present on Hospital Admission: Y  -FL Side: Right  -FL Location: leg  -FL Primary Wound Type: Incision  -FL    Retired Wound - Properties Group Date first assessed: 05/01/23  -FL Time first assessed: 0940  -FL Present on Hospital Admission: Y  -FL Side: Right  -FL Location: leg  -FL Primary Wound Type: Incision  -FL          User Key  (r) = Recorded By, (t) = Taken By, (c) = Cosigned By    Initials Name Provider Type    FL Lacy Contreras, RN Registered Nurse    Emre Jay PTA Physical Therapist Assistant    Nichole Escamilla RN Registered Nurse    Naomy Ruiz, FREIDA  Registered Nurse                  PT Recommendation and Plan         Outcome Measures     Row Name 05/02/23 1500 05/01/23 1500          How much help from another person do you currently need...    Turning from your back to your side while in flat bed without using bedrails? 4  -RH 4  -JAVY (r) SW (t) JAVY (c)     Moving from lying on back to sitting on the side of a flat bed without bedrails? 3  -RH 3  -JAVY (r) SW (t) JAVY (c)     Moving to and from a bed to a chair (including a wheelchair)? 2  -RH 2  -JAVY (r) SW (t) JAVY (c)     Standing up from a chair using your arms (e.g., wheelchair, bedside chair)? 2  -RH 2  -JAVY (r) SW (t) JAVY (c)     Climbing 3-5 steps with a railing? 1  -RH 1  -JAVY (r) SW (t) JAVY (c)     To walk in hospital room? 2  -RH 2  -JAVY (r) SW (t) JAVY (c)     AM-PAC 6 Clicks Score (PT) 14  -RH 14  -JAVY (r) SW (t)        Functional Assessment    Outcome Measure Options -- AM-PAC 6 Clicks Basic Mobility (PT)  -JAVY (r) SW (t) JAVY (c)           User Key  (r) = Recorded By, (t) = Taken By, (c) = Cosigned By    Initials Name Provider Type    RH Emre Luu PTA Physical Therapist Assistant    Lonny Bean, PT Physical Therapist    Elvira Tavarez, PT Student PT Student                 Time Calculation:    PT Charges     Row Name 05/02/23 1539             Time Calculation    PT Received On 05/02/23  -RH         Timed Charges    55488 - PT Therapeutic Exercise Minutes 6  -RH      44465 - PT Therapeutic Activity Minutes 7  -RH         Total Minutes    Timed Charges Total Minutes 13  -RH       Total Minutes 13  -RH            User Key  (r) = Recorded By, (t) = Taken By, (c) = Cosigned By    Initials Name Provider Type     Emre Luu PTA Physical Therapist Assistant              Therapy Charges for Today     Code Description Service Date Service Provider Modifiers Qty    10555902609 HC PT THERAPEUTIC ACT EA 15 MIN 5/2/2023 Emre Luu PTA GP 1          PT G-Codes  Outcome Measure Options: AM-PAC 6 Clicks  Basic Mobility (PT)  AM-PAC 6 Clicks Score (PT): 14  AM-PAC 6 Clicks Score (OT): 10    Emre Luu, PTA  5/2/2023

## 2023-05-03 LAB
ANION GAP SERPL CALCULATED.3IONS-SCNC: 5.3 MMOL/L (ref 5–15)
BUN SERPL-MCNC: 22 MG/DL (ref 8–23)
BUN/CREAT SERPL: 32.4 (ref 7–25)
CALCIUM SPEC-SCNC: 8.4 MG/DL (ref 8.6–10.5)
CHLORIDE SERPL-SCNC: 101 MMOL/L (ref 98–107)
CO2 SERPL-SCNC: 30.7 MMOL/L (ref 22–29)
CREAT SERPL-MCNC: 0.68 MG/DL (ref 0.76–1.27)
DEPRECATED RDW RBC AUTO: 53.1 FL (ref 37–54)
EGFRCR SERPLBLD CKD-EPI 2021: 102.5 ML/MIN/1.73
ERYTHROCYTE [DISTWIDTH] IN BLOOD BY AUTOMATED COUNT: 15.9 % (ref 12.3–15.4)
GLUCOSE BLDC GLUCOMTR-MCNC: 131 MG/DL (ref 70–99)
GLUCOSE BLDC GLUCOMTR-MCNC: 150 MG/DL (ref 70–99)
GLUCOSE BLDC GLUCOMTR-MCNC: 166 MG/DL (ref 70–99)
GLUCOSE BLDC GLUCOMTR-MCNC: 167 MG/DL (ref 70–99)
GLUCOSE SERPL-MCNC: 176 MG/DL (ref 65–99)
HCT VFR BLD AUTO: 28.2 % (ref 37.5–51)
HGB BLD-MCNC: 8.8 G/DL (ref 13–17.7)
MAGNESIUM SERPL-MCNC: 1.6 MG/DL (ref 1.6–2.4)
MCH RBC QN AUTO: 28.5 PG (ref 26.6–33)
MCHC RBC AUTO-ENTMCNC: 31.2 G/DL (ref 31.5–35.7)
MCV RBC AUTO: 91.3 FL (ref 79–97)
PHOSPHATE SERPL-MCNC: 3 MG/DL (ref 2.5–4.5)
PLATELET # BLD AUTO: 376 10*3/MM3 (ref 140–450)
PMV BLD AUTO: 11.6 FL (ref 6–12)
POTASSIUM SERPL-SCNC: 4.7 MMOL/L (ref 3.5–5.2)
RBC # BLD AUTO: 3.09 10*6/MM3 (ref 4.14–5.8)
SODIUM SERPL-SCNC: 137 MMOL/L (ref 136–145)
WBC NRBC COR # BLD: 6.76 10*3/MM3 (ref 3.4–10.8)

## 2023-05-03 PROCEDURE — 97535 SELF CARE MNGMENT TRAINING: CPT

## 2023-05-03 PROCEDURE — 63710000001 INSULIN LISPRO (HUMAN) PER 5 UNITS: Performed by: INTERNAL MEDICINE

## 2023-05-03 PROCEDURE — 63710000001 INSULIN DETEMIR PER 5 UNITS: Performed by: INTERNAL MEDICINE

## 2023-05-03 PROCEDURE — 80048 BASIC METABOLIC PNL TOTAL CA: CPT | Performed by: INTERNAL MEDICINE

## 2023-05-03 PROCEDURE — 25010000002 MAGNESIUM SULFATE 2 GM/50ML SOLUTION: Performed by: INTERNAL MEDICINE

## 2023-05-03 PROCEDURE — 83735 ASSAY OF MAGNESIUM: CPT | Performed by: INTERNAL MEDICINE

## 2023-05-03 PROCEDURE — 82948 REAGENT STRIP/BLOOD GLUCOSE: CPT

## 2023-05-03 PROCEDURE — 85027 COMPLETE CBC AUTOMATED: CPT | Performed by: INTERNAL MEDICINE

## 2023-05-03 PROCEDURE — 25010000002 HEPARIN (PORCINE) PER 1000 UNITS: Performed by: INTERNAL MEDICINE

## 2023-05-03 PROCEDURE — 97110 THERAPEUTIC EXERCISES: CPT

## 2023-05-03 PROCEDURE — 84100 ASSAY OF PHOSPHORUS: CPT | Performed by: INTERNAL MEDICINE

## 2023-05-03 PROCEDURE — 99232 SBSQ HOSP IP/OBS MODERATE 35: CPT | Performed by: INTERNAL MEDICINE

## 2023-05-03 PROCEDURE — 94799 UNLISTED PULMONARY SVC/PX: CPT

## 2023-05-03 RX ORDER — MAGNESIUM SULFATE HEPTAHYDRATE 40 MG/ML
2 INJECTION, SOLUTION INTRAVENOUS ONCE
Status: COMPLETED | OUTPATIENT
Start: 2023-05-03 | End: 2023-05-03

## 2023-05-03 RX ADMIN — HEPARIN SODIUM 5000 UNITS: 5000 INJECTION INTRAVENOUS; SUBCUTANEOUS at 21:06

## 2023-05-03 RX ADMIN — ASPIRIN 81 MG 81 MG: 81 TABLET ORAL at 20:57

## 2023-05-03 RX ADMIN — HYDROCORTISONE ACETATE 1 APPLICATION: 1 CREAM TOPICAL at 20:57

## 2023-05-03 RX ADMIN — HYDROCORTISONE ACETATE 1 APPLICATION: 1 CREAM TOPICAL at 09:23

## 2023-05-03 RX ADMIN — FUROSEMIDE 40 MG: 40 TABLET ORAL at 18:22

## 2023-05-03 RX ADMIN — HEPARIN SODIUM 5000 UNITS: 5000 INJECTION INTRAVENOUS; SUBCUTANEOUS at 05:17

## 2023-05-03 RX ADMIN — MAGNESIUM OXIDE TAB 400 MG (241.3 MG ELEMENTAL MG) 400 MG: 400 (241.3 MG) TAB at 09:21

## 2023-05-03 RX ADMIN — MAGNESIUM SULFATE HEPTAHYDRATE 2 G: 2 INJECTION, SOLUTION INTRAVENOUS at 09:22

## 2023-05-03 RX ADMIN — POTASSIUM CHLORIDE 20 MEQ: 10 CAPSULE, COATED, EXTENDED RELEASE ORAL at 18:22

## 2023-05-03 RX ADMIN — INSULIN DETEMIR 5 UNITS: 100 INJECTION, SOLUTION SUBCUTANEOUS at 21:06

## 2023-05-03 RX ADMIN — POTASSIUM CHLORIDE 20 MEQ: 10 CAPSULE, COATED, EXTENDED RELEASE ORAL at 09:20

## 2023-05-03 RX ADMIN — Medication 10 ML: at 09:21

## 2023-05-03 RX ADMIN — Medication 10 ML: at 20:56

## 2023-05-03 RX ADMIN — ATORVASTATIN CALCIUM 80 MG: 40 TABLET, FILM COATED ORAL at 20:56

## 2023-05-03 RX ADMIN — OXYCODONE 5 MG: 5 TABLET ORAL at 16:10

## 2023-05-03 RX ADMIN — INSULIN LISPRO 2 UNITS: 100 INJECTION, SOLUTION INTRAVENOUS; SUBCUTANEOUS at 09:21

## 2023-05-03 RX ADMIN — OXYCODONE 5 MG: 5 TABLET ORAL at 20:56

## 2023-05-03 RX ADMIN — SACUBITRIL AND VALSARTAN 1 TABLET: 24; 26 TABLET, FILM COATED ORAL at 21:49

## 2023-05-03 RX ADMIN — PREGABALIN 75 MG: 75 CAPSULE ORAL at 09:20

## 2023-05-03 RX ADMIN — CARVEDILOL 6.25 MG: 6.25 TABLET, FILM COATED ORAL at 18:22

## 2023-05-03 RX ADMIN — SACUBITRIL AND VALSARTAN 1 TABLET: 24; 26 TABLET, FILM COATED ORAL at 09:21

## 2023-05-03 RX ADMIN — INSULIN LISPRO 2 UNITS: 100 INJECTION, SOLUTION INTRAVENOUS; SUBCUTANEOUS at 12:52

## 2023-05-03 RX ADMIN — HEPARIN SODIUM 5000 UNITS: 5000 INJECTION INTRAVENOUS; SUBCUTANEOUS at 13:13

## 2023-05-03 RX ADMIN — POTASSIUM CHLORIDE 20 MEQ: 10 CAPSULE, COATED, EXTENDED RELEASE ORAL at 12:52

## 2023-05-03 RX ADMIN — FUROSEMIDE 40 MG: 40 TABLET ORAL at 09:21

## 2023-05-03 RX ADMIN — ASPIRIN 81 MG 81 MG: 81 TABLET ORAL at 09:21

## 2023-05-03 RX ADMIN — CARVEDILOL 6.25 MG: 6.25 TABLET, FILM COATED ORAL at 09:20

## 2023-05-03 RX ADMIN — OXYCODONE 5 MG: 5 TABLET ORAL at 09:20

## 2023-05-03 RX ADMIN — PREGABALIN 75 MG: 75 CAPSULE ORAL at 20:57

## 2023-05-03 NOTE — THERAPY TREATMENT NOTE
Patient Name: Balbir Khan  : 1955    MRN: [id number]                              Today's Date: 5/3/2023       Admit Date: 2023    Visit Dx:     ICD-10-CM ICD-9-CM   1. Acute on chronic congestive heart failure, unspecified heart failure type  I50.9 428.0   2. Decreased activities of daily living (ADL)  Z78.9 V49.89   3. Acute HFrEF (heart failure with reduced ejection fraction)  I50.21 428.21   4. Difficulty in walking  R26.2 719.7     Patient Active Problem List   Diagnosis   • Acute on chronic congestive heart failure, unspecified heart failure type     Past Medical History:   Diagnosis Date   • Diabetes mellitus    • Hyperlipidemia    • Hypertension    • Macular degeneration    • Myocardial infarction      Past Surgical History:   Procedure Laterality Date   • BELOW KNEE AMPUTATION Bilateral    • CATARACT EXTRACTION     • CORONARY ARTERY BYPASS GRAFT     • KNEE SURGERY Bilateral       General Information     Row Name 23 1419          OT Time and Intention    Document Type therapy note (daily note)  -ES     Mode of Treatment individual therapy;occupational therapy  -ES     Row Name 23 1419          General Information    Existing Precautions/Restrictions fall  -ES     Row Name 23 1419          Cognition    Orientation Status (Cognition) oriented to;person;situation  patient cooperative, agreeable to therapy participation. patient spouse present for therapy session.  -ES     Row Name 23 1419          Safety Issues, Functional Mobility    Impairments Affecting Function (Mobility) balance;cognition;strength;endurance/activity tolerance;postural/trunk control  -ES           User Key  (r) = Recorded By, (t) = Taken By, (c) = Cosigned By    Initials Name Provider Type    ES Niurka Day, JOCELINR/L, CSRS Occupational Therapist                 Mobility/ADL's     Row Name 23 1421          Bed Mobility    Bed Mobility supine-sit;sit-supine  -ES     Supine-Sit [place] (Bed  Mobility) minimum assist (75% patient effort);1 person assist  -ES     Sit-Supine [place] (Bed Mobility) moderate assist (50% patient effort);1 person assist  -ES     Bed Mobility, Safety Issues decreased use of legs for bridging/pushing  -ES     Assistive Device (Bed Mobility) bed rails;head of bed elevated  -ES     Row Name 05/03/23 1421          Transfers    Transfers sit-stand transfer;bed-chair transfer  -ES     Comment, (Transfers) STS x5 this session in attempt to transfer patient to bed side chair. First stance, Max A x2. Remaining stands, patient moderate assist x1 with L knee blocked for safety with use of prosthetic.  -ES     Row Name 05/03/23 1421          Bed-Chair Transfer    Comment, (Bed-Chair Transfer) attempt bed <> chair transfer once in stance, however patient is unable to pivot to chair with use of LLE prosthesis, returns self to edge of bed. Attempt x4, however patient is not strong enough to support self in stance for pivot transfer  -ES     Row Name 05/03/23 1421          Sit-Stand Transfer    Sit-Stand [place] (Transfers) maximum assist (25% patient effort);1 person assist;moderate assist (50% patient effort)  initially max A, progressing to mod A  -ES     Row Name 05/03/23 1421          Activities of Daily Living    BADL Assessment/Intervention toileting  patient requires max A to don left prosthesis. Prosthesis with improper fit, patient admits he has not seen Prosthetist x3 years. Provided max education and training on need for refitting of prosthesis for patient safety and skin integrity.  -ES     Row Name 05/03/23 1421          Toileting Assessment/Training    [place] Level (Toileting) toileting skills;change pad/brief;perform perineal hygiene;dependent (less than 25% patient effort)  -ES     Position (Toileting) supine  -ES           User Key  (r) = Recorded By, (t) = Taken By, (c) = Cosigned By    Initials Name Provider Type    Niurka Butler, JOCELINR/L, CSRS Occupational  Therapist               Obj/Interventions     Row Name 05/03/23 1423          Motor Skills    Functional Endurance patient requires encouragement for all transfers. seated edge of bed, patient attempts to return self to supine x2, encouragement required to remain seated edge of bed.  -     Row Name 05/03/23 1423          Balance    Balance Assessment sitting dynamic balance;standing static balance  -ES     Dynamic Sitting Balance standby assist  -ES     Position, Sitting Balance sitting edge of bed  -ES     Static Standing Balance 1-person assist;moderate assist  -ES     Position/Device Used, Standing Balance supported;walker, front-wheeled  -ES           User Key  (r) = Recorded By, (t) = Taken By, (c) = Cosigned By    Initials Name Provider Type    ES Niurka Day, OTR/L, CSRS Occupational Therapist               Goals/Plan    No documentation.                Clinical Impression     Row Name 05/03/23 1428          Plan of Care Review    Plan of Care Reviewed With patient;spouse  -     Progress improving  -ES     Outcome Evaluation Patient with good participation in therapy session today, is agreeable to attempt transfers with use of L prosthesis this session. Patient completes sit <> stand x5, progressing from max A x2 to mod A x1 with stands provided cueing and appropriate LE placement. Patient is unable to complete stand pivot transfer to bed side chair, as patient is unable to extend LLE or advance LLE with mobility secondary to weakness and decreased balance. patient would benefit from continued skilled OT intervention to promote return to baseline. OT continues to recommend post acute rehab placement to address above mentioned deficits to maxamize patient safety and independence at return home.  -     Row Name 05/03/23 1428          Therapy Plan Review/Discharge Plan (OT)    Anticipated Discharge Disposition (OT) sub acute care setting  -     Row Name 05/03/23 1428          Vital Signs    O2 Delivery  Pre Treatment nasal cannula  -ES     O2 Delivery Intra Treatment nasal cannula  -ES     O2 Delivery Post Treatment nasal cannula  -ES     Row Name 05/03/23 1428          Positioning and Restraints    Pre-Treatment Position in bed  -ES     Post Treatment Position bed  -ES           User Key  (r) = Recorded By, (t) = Taken By, (c) = Cosigned By    Initials Name Provider Type    Niurka Butler, OTR/L, CSRS Occupational Therapist               Outcome Measures     Row Name 05/03/23 1434          How much help from another is currently needed...    Putting on and taking off regular lower body clothing? 2  -ES     Bathing (including washing, rinsing, and drying) 2  -ES     Toileting (which includes using toilet bed pan or urinal) 1  -ES     Putting on and taking off regular upper body clothing 3  -ES     Taking care of personal grooming (such as brushing teeth) 3  -ES     Eating meals 3  -ES     AM-PAC 6 Clicks Score (OT) 14  -ES     Row Name 05/03/23 1434          Functional Assessment    Outcome Measure Options AM-PAC 6 Clicks Daily Activity (OT);Optimal Instrument  -ES     Row Name 05/03/23 1434          Optimal Instrument    Optimal Instrument Optimal - 3  -ES     Bending/Stooping 3  -ES     Standing 3  -ES     Reaching 2  -ES           User Key  (r) = Recorded By, (t) = Taken By, (c) = Cosigned By    Initials Name Provider Type    Niurka Butler, OTR/L, SUKHWINDERS Occupational Therapist                Occupational Therapy Education     Title: PT OT SLP Therapies (In Progress)     Topic: Occupational Therapy (In Progress)     Point: ADL training (In Progress)     Description:   Instruct learner(s) on proper safety adaptation and remediation techniques during self care or transfers.   Instruct in proper use of assistive devices.              Learning Progress Summary           Patient Acceptance, E,D, NR by PG at 5/1/2023 1185                   Point: Home exercise program (In Progress)     Description:   Instruct  learner(s) on appropriate technique for monitoring, assisting and/or progressing therapeutic exercises/activities.              Learning Progress Summary           Patient Acceptance, E,D, NR by PG at 5/1/2023 0930                   Point: Precautions (In Progress)     Description:   Instruct learner(s) on prescribed precautions during self-care and functional transfers.              Learning Progress Summary           Patient Acceptance, E,D, NR by PG at 5/1/2023 0930                   Point: Body mechanics (In Progress)     Description:   Instruct learner(s) on proper positioning and spine alignment during self-care, functional mobility activities and/or exercises.              Learning Progress Summary           Patient Acceptance, E,D, NR by PG at 5/1/2023 0930                               User Key     Initials Effective Dates Name Provider Type Discipline    PG 06/16/21 -  Ronaldo Townsend OT Occupational Therapist OT              OT Recommendation and Plan     Plan of Care Review  Plan of Care Reviewed With: patient, spouse  Progress: improving  Outcome Evaluation: Patient with good participation in therapy session today, is agreeable to attempt transfers with use of L prosthesis this session. Patient completes sit <> stand x5, progressing from max A x2 to mod A x1 with stands provided cueing and appropriate LE placement. Patient is unable to complete stand pivot transfer to bed side chair, as patient is unable to extend LLE or advance LLE with mobility secondary to weakness and decreased balance. patient would benefit from continued skilled OT intervention to promote return to baseline. OT continues to recommend post acute rehab placement to address above mentioned deficits to maxamize patient safety and independence at return home.     Time Calculation:    Time Calculation- OT     Row Name 05/03/23 1435             Time Calculation- OT    OT Received On 05/03/23  -ES      OT Goal Re-Cert Due Date 05/10/23   -ES         Timed Charges    11258 - OT Self Care/Mgmt Minutes 28  -ES         Total Minutes    Timed Charges Total Minutes 28  -ES       Total Minutes 28  -ES            User Key  (r) = Recorded By, (t) = Taken By, (c) = Cosigned By    Initials Name Provider Type    Niurka Butler OTR/L, CSRS Occupational Therapist              Therapy Charges for Today     Code Description Service Date Service Provider Modifiers Qty    83114375445 HC OT SELF CARE/MGMT/TRAIN EA 15 MIN 5/3/2023 Niurka Day OTR/L, CSRS GO 2               SEGUN Cobos/L, CSRS  5/3/2023

## 2023-05-03 NOTE — PLAN OF CARE
Goal Outcome Evaluation:      Pt is alert and oriented.  Pt refused bladder scan at 2300, wanted to wait until morning.  Bladder scanned and straight cathed at 0530, pt tolerated well. Pt rested well during night with no complaints. LEMUEL HAHN RN

## 2023-05-03 NOTE — PROGRESS NOTES
[facility]   Hospitalist Progress Note  Date: 5/3/2023  Patient Name: Balbir Khan  : 1955  MRN: [id number]  Date of admission: 2023  Consultants:   -Cardiology: Dr. Caleb Soto    Subjective   Subjective     Chief Complaint: Shortness of breath    Summary:   Balbir Khan is a 67 y.o. male with CAD s/p CABG, dyslipidemia, hypertension, type 2 diabetes mellitus, vascular disease and history of BKA who presented with complaints of shortness of breath.  Patient found to be hypoxic in the ED.  Labs significant for elevated BNP and troponin.  CXR showed pulmonary vascular congestion.  Hospitalist service contacted for further evaluation and management.  IV diuresis started.  Cardiology consulted.  Echo obtained, EF: 35%.  Patient started on aspirin and Coreg.  Prior to admission Raymond catheter had been placed at [facility] due to urinary retention, this was continued during admission.  Patient transitioned to oral diuretic therapy and patient started on Entresto per cardiology.     Interval Followup:   No acute events overnight.  Patient denies any chest pain or shortness of breath.  Stated he was feeling better overall.  Working with PT/OT services.  Nursing with no additional acute issues to report    Pain Medication:   -Oxycodone    Review of Systems   All systems reviewed and negative unless stated otherwise under subjective.    Objective   Objective     Vitals:   Temp:  [97.5 °F (36.4 °C)-98.6 °F (37 °C)] 97.5 °F (36.4 °C)  Heart Rate:  [65-73] 68  Resp:  [14-18] 16  BP: ()/(47-64) 114/55  Flow (L/min):  [2] 2  Physical Exam   Gen: No acute distress, lying in bed resting comfortably, conversant, pleasant  Resp: Good aeration, improved bilateral crackles, normal respiratory effort  Card: RRR, No m/r/g  Abd: Soft, Nontender, Nondistended, + bowel sounds    Result Review    Result Review:  I have personally reviewed the results as below and agree with these findings:  []  Laboratory:   CMP         5/1/2023    04:39 5/2/2023    05:20 5/3/2023    04:29   CMP   Glucose 141   133   176     BUN 15   14   22     Creatinine 0.59   0.62   0.68     EGFR 107.0   105.4   102.5     Sodium 139   138   137     Potassium 3.9   4.3   4.7     Chloride 101   102   101     Calcium 8.4   8.0   8.4     Albumin 2.0   2.2      BUN/Creatinine Ratio 25.4   22.6   32.4     Anion Gap 7.0   5.5   5.3       CBC        4/28/2023    04:00 4/28/2023    09:39 4/29/2023    04:33 5/3/2023    04:29   CBC   WBC 8.07   8.39   6.50   6.76     RBC 3.08   3.09   2.98   3.09     Hemoglobin 8.8   8.8   8.6   8.8     Hematocrit 28.0   28.1   26.9   28.2     MCV 90.9   90.9   90.3   91.3     MCH 28.6   28.5   28.9   28.5     MCHC 31.4   31.3   32.0   31.2     RDW 16.1   16.1   15.8   15.9     Platelets 356   367   366   376        Magnesium low.  Phosphorus within normal limits.    []  Microbiology:   []  Radiology:   [x]  EKG/Telemetry:    []  Cardiology/Vascular:    []  Pathology:  []  Old records:  []  Other:    Assessment & Plan   Assessment / Plan     Assessment:  Acute heart failure with reduced ejection fraction, unknown chronicity  Acute hypoxic respiratory failure  Recent BKA  Type 2 diabetes mellitus with hyperglycemia  CAD s/p CABG  Elevated troponin likely due to demand ischemia from heart failure exacerbation  Hypomagnesemia, recurrent  Hypokalemia, improved  Hypophosphatemia, improved  Urinary retention    Plan:  -Cardiology consulted and following, appreciate assistance and recommendations in the care of this patient.  -Continue Lasix 40 mg oral twice daily  -Continue Coreg  -Management discussed with cardiology.  Patient to start Entresto today (05/03/2023) and patient will need close outpatient follow-up in heart failure clinic after discharge  -Continue lisinopril  -Continue aspirin and atorvastatin   -Replace magnesium IV  -Continue supplemental O2 to maintain sats greater than 90%, wean as tolerated  -Hyperglycemia noted.  Will  start Levemir 5 units nightly and continue SSI.  Monitor blood glucose level and SSI requirement and make adjustments to insulin regimen accordingly  -Patient working with PT/OT services and interested in rehab placement time of discharge.  Social work aware  -Given continued treatment for heart failure with reduced ejection fraction which is being modified and hypoxic respiratory failure patient continues to require monitored level of care in the hospital  -Monitor electrolytes and renal function with BMP and magnesium level in the AM  -Monitor WBC and Hgb with CBC in the AM  -Clinical course will dictate further management     DVT Prophylaxis: Heparin  Diet: Cardiac/Diabetic  Dispo: PT/OT consulted.  Social work making referrals  Code Status: Full code     Personally reviewed patients labs and imaging, discussed with patient and nurse at bedside. Discussed management with the following consultants: Cardiology.     Part of this note may be an electronic transcription/translation of spoken language to printed text using the Dragon dictation system.    DVT prophylaxis:  Medical DVT prophylaxis orders are present.    CODE STATUS:   Level Of Support Discussed With: Patient  Code Status (Patient has no pulse and is not breathing): CPR (Attempt to Resuscitate)  Medical Interventions (Patient has pulse or is breathing): Full Support      Electronically signed by Raleigh Mariscal MD, 05/03/23, 6:49 AM EDT.

## 2023-05-03 NOTE — THERAPY TREATMENT NOTE
Acute Care - Physical Therapy Treatment Note   [facility]     Patient Name: Balbir Khan  : 1955  MRN: [id number]  Today's Date: 5/3/2023      Visit Dx:     ICD-10-CM ICD-9-CM   1. Acute on chronic congestive heart failure, unspecified heart failure type  I50.9 428.0   2. Decreased activities of daily living (ADL)  Z78.9 V49.89   3. Acute HFrEF (heart failure with reduced ejection fraction)  I50.21 428.21   4. Difficulty in walking  R26.2 719.7     Patient Active Problem List   Diagnosis   • Acute on chronic congestive heart failure, unspecified heart failure type     Past Medical History:   Diagnosis Date   • Diabetes mellitus    • Hyperlipidemia    • Hypertension    • Macular degeneration    • Myocardial infarction      Past Surgical History:   Procedure Laterality Date   • BELOW KNEE AMPUTATION Bilateral    • CATARACT EXTRACTION     • CORONARY ARTERY BYPASS GRAFT     • KNEE SURGERY Bilateral      PT Assessment (last 12 hours)     PT Evaluation and Treatment     Row Name 23 1423          Physical Therapy Time and Intention    Subjective Information no complaints (P)   -RA     Document Type therapy note (daily note) (P)   -RA     Mode of Treatment individual therapy;physical therapy (P)   -RA     Patient Effort good (P)   -RA     Row Name 23 1423          Pain Scale: FACES Pre/Post-Treatment    Pain: FACES Scale, Pretreatment 0-->no hurt (P)   -RA     Posttreatment Pain Rating 0-->no hurt (P)   -RA     Row Name 23 1423          Cognition    Affect/Mental Status (Cognition) WFL (P)   -RA     Row Name 23 1423          Motor Skills    Therapeutic Exercise hip;knee;ankle (P)   -RA     Row Name 23 1423          Hip (Therapeutic Exercise)    Hip (Therapeutic Exercise) AROM (active range of motion);isometric exercises;strengthening exercise (P)   -RA     Hip Isometrics (Therapeutic Exercise) bilateral;gluteal sets;supine;2 sets;10 second hold (P)   -RA     Hip Strengthening (Therapeutic  Exercise) bilateral;aBduction;aDduction;supine;20 repititions (P)   -RA     Row Name 05/03/23 1423          Knee (Therapeutic Exercise)    Knee (Therapeutic Exercise) AROM (active range of motion);isometric exercises;strengthening exercise (P)   -RA     Knee Isometrics (Therapeutic Exercise) bilateral;quad sets;supine;2 sets;10 repetitions (P)   -RA     Knee Strengthening (Therapeutic Exercise) bilateral;SLR (straight leg raise);SAQ (short arc quad);supine;10 repetitions;2 sets (P)   -RA     Row Name             Residual Limb Assessment 04/28/23 1948 transtibial (below knee), right    Residual Limb Assessment - Properties Group Placement Date: 04/28/23  -CB Placement Time: 1948  -CB Amputation Date: 04/17/23  -CB Location: transtibial (below knee), right  -CB    Retired Residual Limb Assessment - Properties Group Placement Date: 04/28/23  -CB Placement Time: 1948  -CB Amputation Date: 04/17/23  -CB Location: transtibial (below knee), right  -CB    Retired Residual Limb Assessment - Properties Group Date first assessed: 04/28/23  -CB Time first assessed: 1948  -CB Amputation Date: 04/17/23  -CB Location: transtibial (below knee), right  -CB    Row Name             Residual Limb Assessment 04/28/23 1948 transtibial (below knee), left    Residual Limb Assessment - Properties Group Placement Date: 04/28/23  -CB, unknown  Placement Time: 1948  -CB, unknown  Location: transtibial (below knee), left  -CB    Retired Residual Limb Assessment - Properties Group Placement Date: 04/28/23  -CB, unknown  Placement Time: 1948  -CB, unknown  Location: transtibial (below knee), left  -CB    Retired Residual Limb Assessment - Properties Group Date first assessed: 04/28/23  -CB, unknown  Time first assessed: 1948  -CB, unknown  Location: transtibial (below knee), left  -CB    Row Name             Wound 04/28/23 1800 Right midline coccyx Pressure Injury    Wound - Properties Group Placement Date: 04/28/23  -CZ Placement Time: 1800  -CZ  Present on Hospital Admission: Y  -CZ Side: Right  -CZ Orientation: midline  -CZ Location: coccyx  -CZ Primary Wound Type: Pressure inj  -CZ    Retired Wound - Properties Group Placement Date: 04/28/23  -CZ Placement Time: 1800  -CZ Present on Hospital Admission: Y  -CZ Side: Right  -CZ Orientation: midline  -CZ Location: coccyx  -CZ Primary Wound Type: Pressure inj  -CZ    Retired Wound - Properties Group Date first assessed: 04/28/23  -CZ Time first assessed: 1800  -CZ Present on Hospital Admission: Y  -CZ Side: Right  -CZ Location: coccyx  -CZ Primary Wound Type: Pressure inj  -CZ    Row Name             Wound 05/01/23 0940 Right leg Incision    Wound - Properties Group Placement Date: 05/01/23  -FL Placement Time: 0940  -FL Present on Hospital Admission: Y  -FL Side: Right  -FL Location: leg  -FL Primary Wound Type: Incision  -FL    Retired Wound - Properties Group Placement Date: 05/01/23  -FL Placement Time: 0940  -FL Present on Hospital Admission: Y  -FL Side: Right  -FL Location: leg  -FL Primary Wound Type: Incision  -FL    Retired Wound - Properties Group Date first assessed: 05/01/23  -FL Time first assessed: 0940  -FL Present on Hospital Admission: Y  -FL Side: Right  -FL Location: leg  -FL Primary Wound Type: Incision  -FL    Row Name 05/03/23 1423          Vital Signs    O2 Delivery Intra Treatment nasal cannula (P)   2L o2  -RA           User Key  (r) = Recorded By, (t) = Taken By, (c) = Cosigned By    Initials Name Provider Type    FL Lacy Contreras, RN Registered Nurse    Nichole Escamilla RN Registered Nurse    Kristan Leone, PTA Student PTA Student    CZ Naomy Larson, RN Registered Nurse                  PT Recommendation and Plan         Outcome Measures     Row Name 05/02/23 1500 05/01/23 1500          How much help from another person do you currently need...    Turning from your back to your side while in flat bed without using bedrails? 4  -RH 4  -JAVY (r) SW (t) JAVY (c)     Moving  from lying on back to sitting on the side of a flat bed without bedrails? 3  -RH 3  -JAVY (r) SW (t) JAVY (c)     Moving to and from a bed to a chair (including a wheelchair)? 2  -RH 2  -JAVY (r) SW (t) JAVY (c)     Standing up from a chair using your arms (e.g., wheelchair, bedside chair)? 2  -RH 2  -JAVY (r) SW (t) JAVY (c)     Climbing 3-5 steps with a railing? 1  -RH 1  -JAVY (r) SW (t) JAVY (c)     To walk in hospital room? 2  -RH 2  -JAVY (r) SW (t) JAVY (c)     AM-PAC 6 Clicks Score (PT) 14  -RH 14  -JAVY (r) SW (t)        Functional Assessment    Outcome Measure Options -- AM-PAC 6 Clicks Basic Mobility (PT)  -JAVY (r) SW (t) JAVY (c)           User Key  (r) = Recorded By, (t) = Taken By, (c) = Cosigned By    Initials Name Provider Type     Emre Luu, PTA Physical Therapist Assistant    Lonny Bean, PT Physical Therapist    Elvira Tavarez, PT Student PT Student                 Time Calculation:    PT Charges     Row Name 05/03/23 1420             Time Calculation    PT Received On 05/03/23 (P)   -RA         Timed Charges    70936 - PT Therapeutic Exercise Minutes 10 (P)   -RA         Total Minutes    Timed Charges Total Minutes 10 (P)   -RA       Total Minutes 10 (P)   -RA            User Key  (r) = Recorded By, (t) = Taken By, (c) = Cosigned By    Initials Name Provider Type     Kristan Tenorio PTA Student PTA Student              Therapy Charges for Today     Code Description Service Date Service Provider Modifiers Qty    51233957595 HC PT THER PROC EA 15 MIN 5/3/2023 Kristan Tenorio PTA Student GP 1          PT G-Codes  Outcome Measure Options: AM-PAC 6 Clicks Basic Mobility (PT)  AM-PAC 6 Clicks Score (PT): 14  AM-PAC 6 Clicks Score (OT): 10    Kristan Tenorio PTA Student  5/3/2023

## 2023-05-03 NOTE — PLAN OF CARE
Goal Outcome Evaluation:              Outcome Evaluation: Patient was retaining fluid so a dacosta catheter was placed.  If all labs are good in the morning patient will be discharged to rehab tomorrow.  No signs of distress noted at this time.

## 2023-05-04 LAB
ANION GAP SERPL CALCULATED.3IONS-SCNC: 6 MMOL/L (ref 5–15)
ANION GAP SERPL CALCULATED.3IONS-SCNC: 6.8 MMOL/L (ref 5–15)
BUN SERPL-MCNC: 24 MG/DL (ref 8–23)
BUN SERPL-MCNC: 30 MG/DL (ref 8–23)
BUN/CREAT SERPL: 31.2 (ref 7–25)
BUN/CREAT SERPL: 32.3 (ref 7–25)
CALCIUM SPEC-SCNC: 8.5 MG/DL (ref 8.6–10.5)
CALCIUM SPEC-SCNC: 8.5 MG/DL (ref 8.6–10.5)
CHLORIDE SERPL-SCNC: 100 MMOL/L (ref 98–107)
CHLORIDE SERPL-SCNC: 99 MMOL/L (ref 98–107)
CO2 SERPL-SCNC: 31 MMOL/L (ref 22–29)
CO2 SERPL-SCNC: 31.2 MMOL/L (ref 22–29)
CREAT SERPL-MCNC: 0.77 MG/DL (ref 0.76–1.27)
CREAT SERPL-MCNC: 0.93 MG/DL (ref 0.76–1.27)
DEPRECATED RDW RBC AUTO: 54.4 FL (ref 37–54)
EGFRCR SERPLBLD CKD-EPI 2021: 90.6 ML/MIN/1.73
EGFRCR SERPLBLD CKD-EPI 2021: 98.7 ML/MIN/1.73
ERYTHROCYTE [DISTWIDTH] IN BLOOD BY AUTOMATED COUNT: 16.2 % (ref 12.3–15.4)
GEN 5 2HR TROPONIN T REFLEX: 117 NG/L
GLUCOSE BLDC GLUCOMTR-MCNC: 134 MG/DL (ref 70–99)
GLUCOSE BLDC GLUCOMTR-MCNC: 142 MG/DL (ref 70–99)
GLUCOSE BLDC GLUCOMTR-MCNC: 159 MG/DL (ref 70–99)
GLUCOSE SERPL-MCNC: 169 MG/DL (ref 65–99)
GLUCOSE SERPL-MCNC: 227 MG/DL (ref 65–99)
HCT VFR BLD AUTO: 28.8 % (ref 37.5–51)
HGB BLD-MCNC: 9 G/DL (ref 13–17.7)
MAGNESIUM SERPL-MCNC: 1.7 MG/DL (ref 1.6–2.4)
MAGNESIUM SERPL-MCNC: 2 MG/DL (ref 1.6–2.4)
MCH RBC QN AUTO: 28.6 PG (ref 26.6–33)
MCHC RBC AUTO-ENTMCNC: 31.3 G/DL (ref 31.5–35.7)
MCV RBC AUTO: 91.4 FL (ref 79–97)
PHOSPHATE SERPL-MCNC: 2.9 MG/DL (ref 2.5–4.5)
PLATELET # BLD AUTO: 404 10*3/MM3 (ref 140–450)
PMV BLD AUTO: 11.5 FL (ref 6–12)
POTASSIUM SERPL-SCNC: 4 MMOL/L (ref 3.5–5.2)
POTASSIUM SERPL-SCNC: 5.3 MMOL/L (ref 3.5–5.2)
QT INTERVAL: 356 MS
RBC # BLD AUTO: 3.15 10*6/MM3 (ref 4.14–5.8)
SODIUM SERPL-SCNC: 137 MMOL/L (ref 136–145)
SODIUM SERPL-SCNC: 137 MMOL/L (ref 136–145)
TROPONIN T DELTA: -10 NG/L
TROPONIN T SERPL HS-MCNC: 127 NG/L
WBC NRBC COR # BLD: 8.3 10*3/MM3 (ref 3.4–10.8)

## 2023-05-04 PROCEDURE — 97530 THERAPEUTIC ACTIVITIES: CPT

## 2023-05-04 PROCEDURE — 82948 REAGENT STRIP/BLOOD GLUCOSE: CPT

## 2023-05-04 PROCEDURE — P9047 ALBUMIN (HUMAN), 25%, 50ML: HCPCS | Performed by: PHYSICIAN ASSISTANT

## 2023-05-04 PROCEDURE — 63710000001 INSULIN LISPRO (HUMAN) PER 5 UNITS: Performed by: INTERNAL MEDICINE

## 2023-05-04 PROCEDURE — 84100 ASSAY OF PHOSPHORUS: CPT | Performed by: INTERNAL MEDICINE

## 2023-05-04 PROCEDURE — 93005 ELECTROCARDIOGRAM TRACING: CPT | Performed by: INTERNAL MEDICINE

## 2023-05-04 PROCEDURE — 85027 COMPLETE CBC AUTOMATED: CPT | Performed by: INTERNAL MEDICINE

## 2023-05-04 PROCEDURE — 83735 ASSAY OF MAGNESIUM: CPT | Performed by: PHYSICIAN ASSISTANT

## 2023-05-04 PROCEDURE — 97110 THERAPEUTIC EXERCISES: CPT

## 2023-05-04 PROCEDURE — 94799 UNLISTED PULMONARY SVC/PX: CPT

## 2023-05-04 PROCEDURE — 99232 SBSQ HOSP IP/OBS MODERATE 35: CPT | Performed by: INTERNAL MEDICINE

## 2023-05-04 PROCEDURE — 80048 BASIC METABOLIC PNL TOTAL CA: CPT | Performed by: PHYSICIAN ASSISTANT

## 2023-05-04 PROCEDURE — 25010000002 ALBUMIN HUMAN 25% PER 50 ML: Performed by: PHYSICIAN ASSISTANT

## 2023-05-04 PROCEDURE — 83735 ASSAY OF MAGNESIUM: CPT | Performed by: INTERNAL MEDICINE

## 2023-05-04 PROCEDURE — 25010000002 HEPARIN (PORCINE) PER 1000 UNITS: Performed by: INTERNAL MEDICINE

## 2023-05-04 PROCEDURE — 63710000001 INSULIN DETEMIR PER 5 UNITS: Performed by: INTERNAL MEDICINE

## 2023-05-04 PROCEDURE — 84484 ASSAY OF TROPONIN QUANT: CPT | Performed by: PHYSICIAN ASSISTANT

## 2023-05-04 PROCEDURE — 80048 BASIC METABOLIC PNL TOTAL CA: CPT | Performed by: INTERNAL MEDICINE

## 2023-05-04 RX ORDER — ALBUMIN (HUMAN) 12.5 G/50ML
25 SOLUTION INTRAVENOUS ONCE
Status: COMPLETED | OUTPATIENT
Start: 2023-05-04 | End: 2023-05-04

## 2023-05-04 RX ORDER — NOREPINEPHRINE BITARTRATE 0.03 MG/ML
.02-.3 INJECTION, SOLUTION INTRAVENOUS
Status: DISCONTINUED | OUTPATIENT
Start: 2023-05-04 | End: 2023-05-05

## 2023-05-04 RX ADMIN — FUROSEMIDE 40 MG: 40 TABLET ORAL at 17:28

## 2023-05-04 RX ADMIN — PREGABALIN 75 MG: 75 CAPSULE ORAL at 08:26

## 2023-05-04 RX ADMIN — HEPARIN SODIUM 5000 UNITS: 5000 INJECTION INTRAVENOUS; SUBCUTANEOUS at 14:02

## 2023-05-04 RX ADMIN — OXYCODONE 5 MG: 5 TABLET ORAL at 06:16

## 2023-05-04 RX ADMIN — HEPARIN SODIUM 5000 UNITS: 5000 INJECTION INTRAVENOUS; SUBCUTANEOUS at 23:40

## 2023-05-04 RX ADMIN — INSULIN DETEMIR 5 UNITS: 100 INJECTION, SOLUTION SUBCUTANEOUS at 23:40

## 2023-05-04 RX ADMIN — HEPARIN SODIUM 5000 UNITS: 5000 INJECTION INTRAVENOUS; SUBCUTANEOUS at 06:16

## 2023-05-04 RX ADMIN — SACUBITRIL AND VALSARTAN 1 TABLET: 24; 26 TABLET, FILM COATED ORAL at 08:26

## 2023-05-04 RX ADMIN — CARVEDILOL 6.25 MG: 6.25 TABLET, FILM COATED ORAL at 08:26

## 2023-05-04 RX ADMIN — Medication 10 ML: at 08:26

## 2023-05-04 RX ADMIN — NOREPINEPHRINE BITARTRATE 0.04 MCG/KG/MIN: 0.03 INJECTION, SOLUTION INTRAVENOUS at 21:35

## 2023-05-04 RX ADMIN — HYDROCORTISONE ACETATE 1 APPLICATION: 1 CREAM TOPICAL at 08:26

## 2023-05-04 RX ADMIN — ALBUMIN HUMAN 25 G: 0.25 SOLUTION INTRAVENOUS at 21:35

## 2023-05-04 RX ADMIN — CARVEDILOL 6.25 MG: 6.25 TABLET, FILM COATED ORAL at 17:28

## 2023-05-04 RX ADMIN — INSULIN LISPRO 2 UNITS: 100 INJECTION, SOLUTION INTRAVENOUS; SUBCUTANEOUS at 12:24

## 2023-05-04 RX ADMIN — OXYCODONE 5 MG: 5 TABLET ORAL at 17:31

## 2023-05-04 RX ADMIN — ASPIRIN 81 MG 81 MG: 81 TABLET ORAL at 08:26

## 2023-05-04 RX ADMIN — FUROSEMIDE 40 MG: 40 TABLET ORAL at 08:26

## 2023-05-04 NOTE — PROGRESS NOTES
[facility]   Hospitalist Progress Note  Date: 2023  Patient Name: Balbir Khan  : 1955  MRN: [id number]  Date of admission: 2023  Consultants:   -Cardiology: Dr. Caleb Soto    Subjective   Subjective     Chief Complaint: Shortness of breath    Summary:   Balbir Khan is a 67 y.o. male with CAD s/p CABG, dyslipidemia, hypertension, type 2 diabetes mellitus, vascular disease and history of BKA who presented with complaints of shortness of breath.  Patient found to be hypoxic in the ED.  Labs significant for elevated BNP and troponin.  CXR showed pulmonary vascular congestion.  Hospitalist service contacted for further evaluation and management.  IV diuresis started.  Cardiology consulted.  Echo obtained, EF: 35%.  Patient started on aspirin and Coreg.  Prior to admission Raymond catheter had been placed at [facility] due to urinary retention, this was continued during admission.  Patient transitioned to oral diuretic therapy and patient started on Entresto per cardiology.     Interval Followup:   No acute events overnight.  Hyperkalemia noted on a.m. labs.  Patient's blood pressure has been stable and seems to be tolerating Entresto well.  Patient denies chest pain or worsening shortness of breath.  Nursing with no additional acute issues to report.    Pain Medication:   -Oxycodone    Review of Systems   All systems reviewed and negative unless stated otherwise under subjective.    Objective   Objective     Vitals:   Temp:  [97.2 °F (36.2 °C)-97.7 °F (36.5 °C)] 97.3 °F (36.3 °C)  Heart Rate:  [60-70] 62  Resp:  [17-18] 18  BP: ()/(50-98) 120/60  Flow (L/min):  [2-2.5] 2.5  Physical Exam   Gen: No acute distress, appears older than stated age, sitting up in bed eating breakfast, conversant, pleasant  Resp: Good aeration, improved bilateral crackles, equal chest rise bilaterally  Card: RRR, No m/r/g  Abd: Soft, Nontender, Nondistended, + bowel sounds    Result Review    Result Review:  I  have personally reviewed the results as below and agree with these findings:  []  Laboratory:   CMP        5/2/2023    05:20 5/3/2023    04:29 5/4/2023    04:04   CMP   Glucose 133   176   169     BUN 14   22   30     Creatinine 0.62   0.68   0.93     EGFR 105.4   102.5   90.6     Sodium 138   137   137     Potassium 4.3   4.7   5.3     Chloride 102   101   99     Calcium 8.0   8.4   8.5     Albumin 2.2       BUN/Creatinine Ratio 22.6   32.4   32.3     Anion Gap 5.5   5.3   6.8       CBC        4/29/2023    04:33 5/3/2023    04:29 5/4/2023    04:04   CBC   WBC 6.50   6.76   8.30     RBC 2.98   3.09   3.15     Hemoglobin 8.6   8.8   9.0     Hematocrit 26.9   28.2   28.8     MCV 90.3   91.3   91.4     MCH 28.9   28.5   28.6     MCHC 32.0   31.2   31.3     RDW 15.8   15.9   16.2     Platelets 366   376   404        Phosphorus and magnesium within normal limits.    []  Microbiology:   []  Radiology:   [x]  EKG/Telemetry:    []  Cardiology/Vascular:    []  Pathology:  []  Old records:  []  Other:    Assessment & Plan   Assessment / Plan     Assessment:  Acute heart failure with reduced ejection fraction, unknown chronicity  Acute hypoxic respiratory failure  Recent BKA  Type 2 diabetes mellitus with hyperglycemia  CAD s/p CABG  Elevated troponin likely due to demand ischemia from heart failure exacerbation  Hypomagnesemia, improved  Hypokalemia, improved and now with hyperkalemia  Hypophosphatemia, improved  Urinary retention    Plan:  -Cardiology consulted and following, appreciate assistance and recommendations in the care of this patient.  -Continue Lasix 40 mg oral twice daily  -Continue Coreg  -Management discussed with cardiology.  Patient will continue on Entresto at time of discharge.  Patient will be going to rehab facility in [place] and patient will be establishing care with a cardiologist in [place].  Importance of short-term follow-up after discharge with cardiology stressed at length with the  patient and patient's wife who voiced understanding.  -Continue lisinopril  -Continue aspirin and atorvastatin   -Given hyperkalemia patient will need to continue to be monitored for additional day.  Stop potassium supplementation.  -Continue supplemental O2 to maintain sats greater than 90%, wean as tolerated  -No change to insulin regimen at this time  -Patient working with PT/OT services and interested in rehab placement time of discharge.  Social work aware  -Given continued treatment for heart failure with reduced ejection fraction, hypoxic respiratory failure and hyperkalemia patient continues to require monitored level of care in the hospital  -Will monitor electrolytes and renal function with BMP and magnesium level in the AM  -Will monitor WBC and Hgb with CBC in the AM  -Clinical course will dictate further management     DVT Prophylaxis: Heparin  Diet: Cardiac/Diabetic  Dispo: Patient has bed at [facility] when medically appropriate for discharge  Code Status: Full code     Personally reviewed patients labs and imaging, discussed with patient and nurse at bedside. Discussed management with the following consultants: Cardiology.     Part of this note may be an electronic transcription/translation of spoken language to printed text using the Dragon dictation system.    DVT prophylaxis:  Medical DVT prophylaxis orders are present.    CODE STATUS:   Level Of Support Discussed With: Patient  Code Status (Patient has no pulse and is not breathing): CPR (Attempt to Resuscitate)  Medical Interventions (Patient has pulse or is breathing): Full Support      Electronically signed by Raleigh Mariscal MD, 05/04/23, 9:57 AM EDT.

## 2023-05-04 NOTE — THERAPY TREATMENT NOTE
Acute Care - Physical Therapy Treatment Note  [facility]     Patient Name: Balbir Khan  : 1955  MRN: [id number]  Today's Date: 2023      Visit Dx:     ICD-10-CM ICD-9-CM   1. Acute on chronic congestive heart failure, unspecified heart failure type  I50.9 428.0   2. Decreased activities of daily living (ADL)  Z78.9 V49.89   3. Acute HFrEF (heart failure with reduced ejection fraction)  I50.21 428.21   4. Difficulty in walking  R26.2 719.7     Patient Active Problem List   Diagnosis   • Acute on chronic congestive heart failure, unspecified heart failure type     Past Medical History:   Diagnosis Date   • Diabetes mellitus    • Hyperlipidemia    • Hypertension    • Macular degeneration    • Myocardial infarction      Past Surgical History:   Procedure Laterality Date   • BELOW KNEE AMPUTATION Bilateral    • CATARACT EXTRACTION     • CORONARY ARTERY BYPASS GRAFT     • KNEE SURGERY Bilateral      PT Assessment (last 12 hours)     PT Evaluation and Treatment     Row Name 23 1200          Physical Therapy Time and Intention    Subjective Information no complaints (P)   -RA     Document Type therapy note (daily note) (P)   -RA     Mode of Treatment individual therapy;physical therapy (P)   -RA     Patient Effort adequate (P)   -RA     Row Name 23 1200          Pain Scale: FACES Pre/Post-Treatment    Pain: FACES Scale, Pretreatment 0-->no hurt (P)   -RA     Posttreatment Pain Rating 0-->no hurt (P)   -RA     Row Name 23 1200          Cognition    Affect/Mental Status (Cognition) WFL (P)   -RA     Row Name 23 1200          Bed Mobility    Bed Mobility supine-sit (P)   -RA     All Activities, [place] (Bed Mobility) moderate assist (50% patient effort) (P)   -RA     Assistive Device (Bed Mobility) bed rails;head of bed elevated (P)   -RA     Row Name 23 1200          Transfers    Transfers sit-stand transfer;stand pivot/stand step transfer (P)   -RA     Row Name 23 1200           Sit-Stand Transfer    Sit-Stand [place] (Transfers) maximum assist (25% patient effort);2 person assist (P)   -RA     Assistive Device (Sit-Stand Transfers) walker, front-wheeled (P)   -RA     Row Name 05/04/23 1200          Stand Pivot/Stand Step Transfer    Stand Pivot/Stand Step [place] (Transfers) maximum assist (25% patient effort);2 person assist (P)   -RA     Assistive Device (Stand Pivot Stand Step Transfer) walker, front-wheeled (P)   -RA     Row Name 05/04/23 1200          Motor Skills    Therapeutic Exercise hip;knee (P)   -RA     Row Name 05/04/23 1200          Hip (Therapeutic Exercise)    Hip (Therapeutic Exercise) AROM (active range of motion);strengthening exercise (P)   -RA     Hip Strengthening (Therapeutic Exercise) bilateral;aBduction;aDduction;sitting;10 repetitions;2 sets (P)   -RA     Row Name 05/04/23 1200          Knee (Therapeutic Exercise)    Knee (Therapeutic Exercise) AROM (active range of motion);isometric exercises;strengthening exercise (P)   -RA     Knee AROM (Therapeutic Exercise) bilateral;SLR (straight leg raise);SAQ (short arc quad);sitting;20 repititions (P)   -RA     Knee Isometrics (Therapeutic Exercise) bilateral;quad sets;sitting;2 sets;10 repetitions (P)   -RA     Row Name             Residual Limb Assessment 04/28/23 1948 transtibial (below knee), right    Residual Limb Assessment - Properties Group Placement Date: 04/28/23  -CB Placement Time: 1948  -CB Amputation Date: 04/17/23  -CB Location: transtibial (below knee), right  -CB    Retired Residual Limb Assessment - Properties Group Placement Date: 04/28/23  -CB Placement Time: 1948  -CB Amputation Date: 04/17/23  -CB Location: transtibial (below knee), right  -CB    Retired Residual Limb Assessment - Properties Group Date first assessed: 04/28/23  -CB Time first assessed: 1948  -CB Amputation Date: 04/17/23  -CB Location: transtibial (below knee), right  -CB    Row Name             Residual Limb  Assessment 04/28/23 1948 transtibial (below knee), left    Residual Limb Assessment - Properties Group Placement Date: 04/28/23  -CB, unknown  Placement Time: 1948  -CB, unknown  Location: transtibial (below knee), left  -CB    Retired Residual Limb Assessment - Properties Group Placement Date: 04/28/23  -CB, unknown  Placement Time: 1948  -CB, unknown  Location: transtibial (below knee), left  -CB    Retired Residual Limb Assessment - Properties Group Date first assessed: 04/28/23  -CB, unknown  Time first assessed: 1948  -CB, unknown  Location: transtibial (below knee), left  -CB    Row Name             Wound 04/28/23 1800 Right midline coccyx Pressure Injury    Wound - Properties Group Placement Date: 04/28/23  -CZ Placement Time: 1800  -CZ Present on Hospital Admission: Y  -CZ Side: Right  -CZ Orientation: midline  -CZ Location: coccyx  -CZ Primary Wound Type: Pressure inj  -CZ    Retired Wound - Properties Group Placement Date: 04/28/23  -CZ Placement Time: 1800  -CZ Present on Hospital Admission: Y  -CZ Side: Right  -CZ Orientation: midline  -CZ Location: coccyx  -CZ Primary Wound Type: Pressure inj  -CZ    Retired Wound - Properties Group Date first assessed: 04/28/23  -CZ Time first assessed: 1800  -CZ Present on Hospital Admission: Y  -CZ Side: Right  -CZ Location: coccyx  -CZ Primary Wound Type: Pressure inj  -CZ    Row Name             Wound 05/01/23 0940 Right leg Incision    Wound - Properties Group Placement Date: 05/01/23  -FL Placement Time: 0940  -FL Present on Hospital Admission: Y  -FL Side: Right  -FL Location: leg  -FL Primary Wound Type: Incision  -FL    Retired Wound - Properties Group Placement Date: 05/01/23  -FL Placement Time: 0940  -FL Present on Hospital Admission: Y  -FL Side: Right  -FL Location: leg  -FL Primary Wound Type: Incision  -FL    Retired Wound - Properties Group Date first assessed: 05/01/23  -FL Time first assessed: 0940  -FL Present on Hospital Admission: Y  -FL Side:  Right  -FL Location: leg  -FL Primary Wound Type: Incision  -FL    Row Name 05/04/23 1200          Positioning and Restraints    Pre-Treatment Position in bed (P)   -RA     Post Treatment Position chair (P)   -RA     In Chair reclined;sitting;exit alarm on;notified nsg;call light within reach;legs elevated (P)   -RA           User Key  (r) = Recorded By, (t) = Taken By, (c) = Cosigned By    Initials Name Provider Type    FL Lacy Contreras, RN Registered Nurse    Nichole Escamilla RN Registered Nurse    Kristan Leone, PTA Student PTA Student    CZ Naomy Larson RN Registered Nurse                  PT Recommendation and Plan         Outcome Measures     Row Name 05/02/23 1500 05/01/23 1500          How much help from another person do you currently need...    Turning from your back to your side while in flat bed without using bedrails? 4  - 4  -JAVY (r) SW (t) JAVY (c)     Moving from lying on back to sitting on the side of a flat bed without bedrails? 3  - 3  -JAVY (r) SW (t) JAVY (c)     Moving to and from a bed to a chair (including a wheelchair)? 2  -RH 2  -JAVY (r) SW (t) JAVY (c)     Standing up from a chair using your arms (e.g., wheelchair, bedside chair)? 2  - 2  -JAVY (r) SW (t) JAVY (c)     Climbing 3-5 steps with a railing? 1  - 1  -JAVY (r) SW (t) JAVY (c)     To walk in hospital room? 2  - 2  -JAVY (r) SW (t) JAVY (c)     AM-PAC 6 Clicks Score (PT) 14  -RH 14  -JAVY (r) SW (t)        Functional Assessment    Outcome Measure Options -- AM-PAC 6 Clicks Basic Mobility (PT)  -JAVY (r) SW (t) JAVY (c)           User Key  (r) = Recorded By, (t) = Taken By, (c) = Cosigned By    Initials Name Provider Type     Emre Luu, PTA Physical Therapist Assistant    Lonny Bean, PT Physical Therapist    Elvira Tavarez, PT Student PT Student                 Time Calculation:    PT Charges     Row Name 05/04/23 1243             Time Calculation    PT Received On 05/04/23 (P)   -RA         Timed Charges    05703  - PT Therapeutic Exercise Minutes 8 (P)   -RA      45821 - PT Therapeutic Activity Minutes 18 (P)   -RA         Total Minutes    Timed Charges Total Minutes 26 (P)   -RA       Total Minutes 26 (P)   -RA            User Key  (r) = Recorded By, (t) = Taken By, (c) = Cosigned By    Initials Name Provider Type    Kristan Leone PTA Student PTA Student              Therapy Charges for Today     Code Description Service Date Service Provider Modifiers Qty    37044409467 HC PT THER PROC EA 15 MIN 5/3/2023 Kristan Tenorio, MARCELINO Student GP 1    03422392795 HC PT THER PROC EA 15 MIN 5/4/2023 Kristan Tenorio, MARCELINO Student GP 1    36125338237 HC PT THERAPEUTIC ACT EA 15 MIN 5/4/2023 Kristan Tenorio, MARCELINO Student GP 1          PT G-Codes  Outcome Measure Options: AM-PAC 6 Clicks Daily Activity (OT), Optimal Instrument  AM-PAC 6 Clicks Score (PT): 12  AM-PAC 6 Clicks Score (OT): 14    Kristan Tenorio PTA Student  5/4/2023

## 2023-05-04 NOTE — THERAPY TREATMENT NOTE
Patient Name: Balbir Khan  : 1955    MRN: [id number]                              Today's Date: 2023       Admit Date: 2023    Visit Dx:     ICD-10-CM ICD-9-CM   1. Acute on chronic congestive heart failure, unspecified heart failure type  I50.9 428.0   2. Decreased activities of daily living (ADL)  Z78.9 V49.89   3. Acute HFrEF (heart failure with reduced ejection fraction)  I50.21 428.21   4. Difficulty in walking  R26.2 719.7     Patient Active Problem List   Diagnosis   • Acute on chronic congestive heart failure, unspecified heart failure type     Past Medical History:   Diagnosis Date   • Diabetes mellitus    • Hyperlipidemia    • Hypertension    • Macular degeneration    • Myocardial infarction      Past Surgical History:   Procedure Laterality Date   • BELOW KNEE AMPUTATION Bilateral    • CATARACT EXTRACTION     • CORONARY ARTERY BYPASS GRAFT     • KNEE SURGERY Bilateral       General Information     Row Name 23 1418          OT Time and Intention    Document Type therapy note (daily note)  -ES     Mode of Treatment individual therapy;occupational therapy  -ES     Row Name 23 1418          General Information    Existing Precautions/Restrictions fall  -ES     Row Name 23 1418          Cognition    Orientation Status (Cognition) oriented x 3  patient pleasant and cooperative, agreeable to therapy participation  -ES     Row Name 23 1418          Safety Issues, Functional Mobility    Impairments Affecting Function (Mobility) balance;cognition;strength;endurance/activity tolerance;postural/trunk control  -ES           User Key  (r) = Recorded By, (t) = Taken By, (c) = Cosigned By    Initials Name Provider Type    ES Niurka Day, JOCELINR/L, CSRS Occupational Therapist                 Mobility/ADL's     Row Name 23 1419          Bed Mobility    Bed Mobility supine-sit  -ES     All Activities, Summers (Bed Mobility) minimum assist (75% patient effort);1 person  assist  -ES     Bed Mobility, Safety Issues decreased use of arms for pushing/pulling;decreased use of legs for bridging/pushing  -ES     Assistive Device (Bed Mobility) bed rails;head of bed elevated  -ES     Row Name 05/04/23 1419          Transfers    Transfers sit-stand transfer;bed-chair transfer  -ES     Row Name 05/04/23 1419          Bed-Chair Transfer    Bed-Chair Drew (Transfers) maximum assist (25% patient effort);2 person assist  -ES     Assistive Device (Bed-Chair Transfers) other (see comments)  no use of assistive device  -ES     Comment, (Bed-Chair Transfer) bed <> chair transfer max A x2 with prosthesis donned. Used assistive device, however transfer would be more efficient without use of device at this time  -ES     Row Name 05/04/23 1419          Sit-Stand Transfer    Sit-Stand Drew (Transfers) maximum assist (25% patient effort);2 person assist  -ES     Assistive Device (Sit-Stand Transfers) walker, front-wheeled  -ES     Comment, (Sit-Stand Transfer) STS x4 prior to bed <> chair transfer  -ES           User Key  (r) = Recorded By, (t) = Taken By, (c) = Cosigned By    Initials Name Provider Type    ES Niurka Day, OTR/L, CSRS Occupational Therapist               Obj/Interventions     Row Name 05/04/23 1421          Shoulder (Therapeutic Exercise)    Shoulder (Therapeutic Exercise) strengthening exercise  -ES     Shoulder Strengthening (Therapeutic Exercise) bilateral;flexion;horizontal aBduction/aDduction;resistance band;yellow  set of 10, followerd by set of 5 with light, yellow resistnace band in seated position  -ES     Row Name 05/04/23 1421          Elbow/Forearm (Therapeutic Exercise)    Elbow/Forearm (Therapeutic Exercise) strengthening exercise  -ES     Elbow/Forearm Strengthening (Therapeutic Exercise) bilateral;flexion;extension;resistance band;yellow  1 set of 10, followed by set of 5 with light yellow theraband in seated position.  -ES     Row Name 05/04/23 1421           Motor Skills    Therapeutic Exercise shoulder;elbow/forearm  -ES     Row Name 05/04/23 1421          Balance    Balance Assessment sitting dynamic balance;standing dynamic balance  -ES     Dynamic Sitting Balance standby assist  -ES     Position, Sitting Balance unsupported;sitting edge of bed  -ES     Dynamic Standing Balance maximum assist;2-person assist  -ES     Position/Device Used, Standing Balance supported  -ES           User Key  (r) = Recorded By, (t) = Taken By, (c) = Cosigned By    Initials Name Provider Type    Niurka Butler, OTR/L, SUKHWINDERS Occupational Therapist               Goals/Plan    No documentation.                Clinical Impression     Row Name 05/04/23 1424          Plan of Care Review    Plan of Care Reviewed With patient  -ES     Progress improving  -ES     Outcome Evaluation Patient with good participation in therapy session today, session focused on strengthening for transfers and mobility. Patient completes bed <> chair transfer, max A x2 with prosthesis donned. Patient with limited ability to bear weight through LLE, relying on bilateral upper extremities, which are also weak. Upper extremitiy exercises completed to initiate addressing upper extremity weakness to increase patient strength required for transfers and mobility. Patient toelrates well, however fatigues quicklty. Patient will benefit from continued skilled OT intervention to address aforementioned deficits.  -ES     Row Name 05/04/23 1424          Vital Signs    O2 Delivery Pre Treatment nasal cannula  -ES     O2 Delivery Intra Treatment nasal cannula  -ES     O2 Delivery Post Treatment nasal cannula  -ES           User Key  (r) = Recorded By, (t) = Taken By, (c) = Cosigned By    Initials Name Provider Type    Niurka Butler, OTR/L, CSRS Occupational Therapist               Outcome Measures     Row Name 05/04/23 1426          How much help from another is currently needed...    Putting on and taking off regular  lower body clothing? 2  -ES     Bathing (including washing, rinsing, and drying) 2  -ES     Toileting (which includes using toilet bed pan or urinal) 1  -ES     Putting on and taking off regular upper body clothing 3  -ES     Taking care of personal grooming (such as brushing teeth) 3  -ES     Eating meals 3  -ES     AM-PAC 6 Clicks Score (OT) 14  -ES     Row Name 05/04/23 0705          How much help from another person do you currently need...    Turning from your back to your side while in flat bed without using bedrails? 3  -LG     Moving from lying on back to sitting on the side of a flat bed without bedrails? 3  -LG     Moving to and from a bed to a chair (including a wheelchair)? 2  -LG     Standing up from a chair using your arms (e.g., wheelchair, bedside chair)? 2  -LG     Climbing 3-5 steps with a railing? 1  -LG     To walk in hospital room? 1  -LG     AM-PAC 6 Clicks Score (PT) 12  -LG     Highest level of mobility 4 --> Transferred to chair/commode  -LG     Row Name 05/04/23 1426          Functional Assessment    Outcome Measure Options AM-PAC 6 Clicks Daily Activity (OT);Optimal Instrument  -ES     Row Name 05/04/23 1426          Optimal Instrument    Optimal Instrument Optimal - 3  -ES     Bending/Stooping 3  -ES     Standing 4  -ES     Reaching 2  -ES           User Key  (r) = Recorded By, (t) = Taken By, (c) = Cosigned By    Initials Name Provider Type    LG Jovita Awad, RN Registered Nurse    Niurka Butler, OTR/L, CSRS Occupational Therapist                Occupational Therapy Education     Title: PT OT SLP Therapies (Done)     Topic: Occupational Therapy (Done)     Point: ADL training (Done)     Description:   Instruct learner(s) on proper safety adaptation and remediation techniques during self care or transfers.   Instruct in proper use of assistive devices.              Learning Progress Summary           Patient Acceptance, E,TB, VU,NR by BE at 5/3/2023 1830    Acceptance, E,D, NR by  PG at 5/1/2023 0930                   Point: Home exercise program (Done)     Description:   Instruct learner(s) on appropriate technique for monitoring, assisting and/or progressing therapeutic exercises/activities.              Learning Progress Summary           Patient Acceptance, E,TB, VU,NR by BE at 5/3/2023 1830    Acceptance, E,D, NR by PG at 5/1/2023 0930                   Point: Precautions (Done)     Description:   Instruct learner(s) on prescribed precautions during self-care and functional transfers.              Learning Progress Summary           Patient Acceptance, E,TB, VU,NR by BE at 5/3/2023 1830    Acceptance, E,D, NR by PG at 5/1/2023 0930                   Point: Body mechanics (Done)     Description:   Instruct learner(s) on proper positioning and spine alignment during self-care, functional mobility activities and/or exercises.              Learning Progress Summary           Patient Acceptance, E,TB, VU,NR by BE at 5/3/2023 1830    Acceptance, E,D, NR by PG at 5/1/2023 0930                               User Key     Initials Effective Dates Name Provider Type Discipline    PG 06/16/21 -  Ronaldo Townsend OT Occupational Therapist OT    BE 01/16/23 -  Cara Metzger RN Registered Nurse Nurse              OT Recommendation and Plan     Plan of Care Review  Plan of Care Reviewed With: patient  Progress: improving  Outcome Evaluation: Patient with good participation in therapy session today, session focused on strengthening for transfers and mobility. Patient completes bed <> chair transfer, max A x2 with prosthesis donned. Patient with limited ability to bear weight through LLE, relying on bilateral upper extremities, which are also weak. Upper extremitiy exercises completed to initiate addressing upper extremity weakness to increase patient strength required for transfers and mobility. Patient toelrates well, however fatigues quicklty. Patient will benefit from continued skilled OT intervention to  address aforementioned deficits.     Time Calculation:    Time Calculation- OT     Row Name 05/04/23 1427             Time Calculation- OT    OT Received On 05/04/23  -ES      OT Goal Re-Cert Due Date 05/10/23  -ES         Timed Charges    47360 - OT Therapeutic Exercise Minutes 15  -ES      54317 - OT Therapeutic Activity Minutes 10  -ES         Total Minutes    Timed Charges Total Minutes 25  -ES       Total Minutes 25  -ES            User Key  (r) = Recorded By, (t) = Taken By, (c) = Cosigned By    Initials Name Provider Type    ES Niurka Day, OTR/L, CSRS Occupational Therapist              Therapy Charges for Today     Code Description Service Date Service Provider Modifiers Qty    05374388157 HC OT SELF CARE/MGMT/TRAIN EA 15 MIN 5/3/2023 Niurka Day, OTR/L, CSRS GO 2    08732540639 HC OT THER PROC EA 15 MIN 5/4/2023 Niurka Day, OTR/L, CSRS GO 1    23239472394 HC OT THERAPEUTIC ACT EA 15 MIN 5/4/2023 Niurka Day, OTR/L, CSRS GO 1               Niurka Day OTR/L, CSRS  5/4/2023

## 2023-05-04 NOTE — PLAN OF CARE
Problem: Fall Injury Risk  Goal: Absence of Fall and Fall-Related Injury  Outcome: Ongoing, Progressing     Problem: Adult Inpatient Plan of Care  Goal: Plan of Care Review  Outcome: Ongoing, Progressing  Goal: Patient-Specific Goal (Individualized)  Outcome: Ongoing, Progressing  Goal: Absence of Hospital-Acquired Illness or Injury  Outcome: Ongoing, Progressing  Goal: Optimal Comfort and Wellbeing  Outcome: Ongoing, Progressing  Goal: Readiness for Transition of Care  Outcome: Ongoing, Progressing     Problem: Diabetes Comorbidity  Goal: Blood Glucose Level Within Targeted Range  Outcome: Ongoing, Progressing     Problem: Heart Failure Comorbidity  Goal: Maintenance of Heart Failure Symptom Control  Outcome: Ongoing, Progressing     Problem: Hypertension Comorbidity  Goal: Blood Pressure in Desired Range  Outcome: Ongoing, Progressing     Problem: Skin Injury Risk Increased  Goal: Skin Health and Integrity  Outcome: Ongoing, Progressing     Problem: Adjustment to Illness (Heart Failure)  Goal: Optimal Coping  Outcome: Ongoing, Progressing     Problem: Cardiac Output Decreased (Heart Failure)  Goal: Optimal Cardiac Output  Outcome: Ongoing, Progressing     Problem: Dysrhythmia (Heart Failure)  Goal: Stable Heart Rate and Rhythm  Outcome: Ongoing, Progressing     Problem: Fluid Imbalance (Heart Failure)  Goal: Fluid Balance  Outcome: Ongoing, Progressing     Problem: Functional Ability Impaired (Heart Failure)  Goal: Optimal Functional Ability  Outcome: Ongoing, Progressing     Problem: Oral Intake Inadequate (Heart Failure)  Goal: Optimal Nutrition Intake  Outcome: Ongoing, Progressing     Problem: Respiratory Compromise (Heart Failure)  Goal: Effective Oxygenation and Ventilation  Outcome: Ongoing, Progressing     Problem: Sleep Disordered Breathing (Heart Failure)  Goal: Effective Breathing Pattern During Sleep  Outcome: Ongoing, Progressing   Goal Outcome Evaluation:

## 2023-05-05 ENCOUNTER — APPOINTMENT (OUTPATIENT)
Dept: GENERAL RADIOLOGY | Facility: HOSPITAL | Age: 68
End: 2023-05-05
Payer: MEDICARE

## 2023-05-05 LAB
ANION GAP SERPL CALCULATED.3IONS-SCNC: 7.7 MMOL/L (ref 5–15)
BUN SERPL-MCNC: 23 MG/DL (ref 8–23)
BUN/CREAT SERPL: 33.8 (ref 7–25)
CALCIUM SPEC-SCNC: 8.5 MG/DL (ref 8.6–10.5)
CHLORIDE SERPL-SCNC: 101 MMOL/L (ref 98–107)
CO2 SERPL-SCNC: 28.3 MMOL/L (ref 22–29)
CREAT SERPL-MCNC: 0.68 MG/DL (ref 0.76–1.27)
DEPRECATED RDW RBC AUTO: 53.8 FL (ref 37–54)
EGFRCR SERPLBLD CKD-EPI 2021: 102.5 ML/MIN/1.73
ERYTHROCYTE [DISTWIDTH] IN BLOOD BY AUTOMATED COUNT: 16.1 % (ref 12.3–15.4)
GLUCOSE BLDC GLUCOMTR-MCNC: 171 MG/DL (ref 70–99)
GLUCOSE BLDC GLUCOMTR-MCNC: 183 MG/DL (ref 70–99)
GLUCOSE BLDC GLUCOMTR-MCNC: 185 MG/DL (ref 70–99)
GLUCOSE BLDC GLUCOMTR-MCNC: 188 MG/DL (ref 70–99)
GLUCOSE SERPL-MCNC: 240 MG/DL (ref 65–99)
HCT VFR BLD AUTO: 26.8 % (ref 37.5–51)
HGB BLD-MCNC: 8.4 G/DL (ref 13–17.7)
MAGNESIUM SERPL-MCNC: 1.7 MG/DL (ref 1.6–2.4)
MCH RBC QN AUTO: 28.6 PG (ref 26.6–33)
MCHC RBC AUTO-ENTMCNC: 31.3 G/DL (ref 31.5–35.7)
MCV RBC AUTO: 91.2 FL (ref 79–97)
PHOSPHATE SERPL-MCNC: 3.7 MG/DL (ref 2.5–4.5)
PLATELET # BLD AUTO: 378 10*3/MM3 (ref 140–450)
PMV BLD AUTO: 11.1 FL (ref 6–12)
POTASSIUM SERPL-SCNC: 3.8 MMOL/L (ref 3.5–5.2)
RBC # BLD AUTO: 2.94 10*6/MM3 (ref 4.14–5.8)
SODIUM SERPL-SCNC: 137 MMOL/L (ref 136–145)
WBC NRBC COR # BLD: 7.64 10*3/MM3 (ref 3.4–10.8)

## 2023-05-05 PROCEDURE — 94799 UNLISTED PULMONARY SVC/PX: CPT

## 2023-05-05 PROCEDURE — 94761 N-INVAS EAR/PLS OXIMETRY MLT: CPT

## 2023-05-05 PROCEDURE — 85027 COMPLETE CBC AUTOMATED: CPT | Performed by: INTERNAL MEDICINE

## 2023-05-05 PROCEDURE — 80048 BASIC METABOLIC PNL TOTAL CA: CPT | Performed by: INTERNAL MEDICINE

## 2023-05-05 PROCEDURE — 25010000002 HEPARIN (PORCINE) PER 1000 UNITS: Performed by: INTERNAL MEDICINE

## 2023-05-05 PROCEDURE — 99233 SBSQ HOSP IP/OBS HIGH 50: CPT | Performed by: INTERNAL MEDICINE

## 2023-05-05 PROCEDURE — 84100 ASSAY OF PHOSPHORUS: CPT | Performed by: INTERNAL MEDICINE

## 2023-05-05 PROCEDURE — 99232 SBSQ HOSP IP/OBS MODERATE 35: CPT | Performed by: INTERNAL MEDICINE

## 2023-05-05 PROCEDURE — 97110 THERAPEUTIC EXERCISES: CPT

## 2023-05-05 PROCEDURE — 83735 ASSAY OF MAGNESIUM: CPT | Performed by: INTERNAL MEDICINE

## 2023-05-05 PROCEDURE — 63710000001 INSULIN LISPRO (HUMAN) PER 5 UNITS: Performed by: INTERNAL MEDICINE

## 2023-05-05 PROCEDURE — 63710000001 INSULIN DETEMIR PER 5 UNITS: Performed by: INTERNAL MEDICINE

## 2023-05-05 PROCEDURE — 99291 CRITICAL CARE FIRST HOUR: CPT | Performed by: STUDENT IN AN ORGANIZED HEALTH CARE EDUCATION/TRAINING PROGRAM

## 2023-05-05 PROCEDURE — 71045 X-RAY EXAM CHEST 1 VIEW: CPT

## 2023-05-05 PROCEDURE — 25010000002 MAGNESIUM SULFATE 2 GM/50ML SOLUTION: Performed by: PHYSICIAN ASSISTANT

## 2023-05-05 PROCEDURE — 82948 REAGENT STRIP/BLOOD GLUCOSE: CPT

## 2023-05-05 RX ORDER — HYDROCODONE BITARTRATE AND ACETAMINOPHEN 5; 325 MG/1; MG/1
1 TABLET ORAL EVERY 6 HOURS PRN
Status: DISCONTINUED | OUTPATIENT
Start: 2023-05-05 | End: 2023-05-08

## 2023-05-05 RX ORDER — PREGABALIN 75 MG/1
150 CAPSULE ORAL 2 TIMES DAILY
Status: DISCONTINUED | OUTPATIENT
Start: 2023-05-05 | End: 2023-05-10 | Stop reason: HOSPADM

## 2023-05-05 RX ORDER — NOREPINEPHRINE BITARTRATE 0.03 MG/ML
.02-.3 INJECTION, SOLUTION INTRAVENOUS
Status: DISCONTINUED | OUTPATIENT
Start: 2023-05-05 | End: 2023-05-06

## 2023-05-05 RX ORDER — ACETAMINOPHEN 325 MG/1
650 TABLET ORAL EVERY 6 HOURS PRN
Status: DISCONTINUED | OUTPATIENT
Start: 2023-05-05 | End: 2023-05-10 | Stop reason: HOSPADM

## 2023-05-05 RX ORDER — MAGNESIUM SULFATE HEPTAHYDRATE 40 MG/ML
2 INJECTION, SOLUTION INTRAVENOUS ONCE
Status: COMPLETED | OUTPATIENT
Start: 2023-05-05 | End: 2023-05-05

## 2023-05-05 RX ORDER — POTASSIUM CHLORIDE 750 MG/1
20 CAPSULE, EXTENDED RELEASE ORAL ONCE
Status: COMPLETED | OUTPATIENT
Start: 2023-05-05 | End: 2023-05-05

## 2023-05-05 RX ADMIN — HYDROCORTISONE ACETATE 1 APPLICATION: 1 CREAM TOPICAL at 21:04

## 2023-05-05 RX ADMIN — NOREPINEPHRINE BITARTRATE 0.02 MCG/KG/MIN: 0.03 INJECTION, SOLUTION INTRAVENOUS at 11:10

## 2023-05-05 RX ADMIN — POTASSIUM CHLORIDE 20 MEQ: 10 CAPSULE, COATED, EXTENDED RELEASE ORAL at 09:16

## 2023-05-05 RX ADMIN — FUROSEMIDE 40 MG: 40 TABLET ORAL at 09:16

## 2023-05-05 RX ADMIN — INSULIN LISPRO 2 UNITS: 100 INJECTION, SOLUTION INTRAVENOUS; SUBCUTANEOUS at 12:25

## 2023-05-05 RX ADMIN — CARVEDILOL 6.25 MG: 6.25 TABLET, FILM COATED ORAL at 09:16

## 2023-05-05 RX ADMIN — INSULIN DETEMIR 5 UNITS: 100 INJECTION, SOLUTION SUBCUTANEOUS at 21:05

## 2023-05-05 RX ADMIN — ASPIRIN 81 MG 81 MG: 81 TABLET ORAL at 09:16

## 2023-05-05 RX ADMIN — INSULIN LISPRO 2 UNITS: 100 INJECTION, SOLUTION INTRAVENOUS; SUBCUTANEOUS at 09:13

## 2023-05-05 RX ADMIN — HEPARIN SODIUM 5000 UNITS: 5000 INJECTION INTRAVENOUS; SUBCUTANEOUS at 06:16

## 2023-05-05 RX ADMIN — OXYCODONE 5 MG: 5 TABLET ORAL at 09:28

## 2023-05-05 RX ADMIN — ATORVASTATIN CALCIUM 80 MG: 40 TABLET, FILM COATED ORAL at 21:04

## 2023-05-05 RX ADMIN — Medication 10 ML: at 21:04

## 2023-05-05 RX ADMIN — INSULIN LISPRO 2 UNITS: 100 INJECTION, SOLUTION INTRAVENOUS; SUBCUTANEOUS at 17:08

## 2023-05-05 RX ADMIN — HEPARIN SODIUM 5000 UNITS: 5000 INJECTION INTRAVENOUS; SUBCUTANEOUS at 21:04

## 2023-05-05 RX ADMIN — Medication 10 ML: at 09:17

## 2023-05-05 RX ADMIN — PREGABALIN 150 MG: 75 CAPSULE ORAL at 21:04

## 2023-05-05 RX ADMIN — HYDROCODONE BITARTRATE AND ACETAMINOPHEN 1 TABLET: 5; 325 TABLET ORAL at 18:12

## 2023-05-05 RX ADMIN — HYDROCORTISONE ACETATE 1 APPLICATION: 1 CREAM TOPICAL at 09:20

## 2023-05-05 RX ADMIN — ASPIRIN 81 MG 81 MG: 81 TABLET ORAL at 21:04

## 2023-05-05 RX ADMIN — MAGNESIUM SULFATE HEPTAHYDRATE 2 G: 2 INJECTION, SOLUTION INTRAVENOUS at 09:24

## 2023-05-05 RX ADMIN — CARVEDILOL 6.25 MG: 6.25 TABLET, FILM COATED ORAL at 17:08

## 2023-05-05 RX ADMIN — FUROSEMIDE 40 MG: 40 TABLET ORAL at 17:08

## 2023-05-05 RX ADMIN — PREGABALIN 75 MG: 75 CAPSULE ORAL at 09:16

## 2023-05-05 RX ADMIN — HEPARIN SODIUM 5000 UNITS: 5000 INJECTION INTRAVENOUS; SUBCUTANEOUS at 14:32

## 2023-05-05 NOTE — CONSULTS
Pulmonary / Critical Care Consult Note      Patient Name: Balbir Khan  : 1955  MRN: [id number]  Primary Care Physician:  Rom Barrett PA-C  Referring Physician: Raleigh Mariscal MD  Date of admission: 2023    Subjective   Subjective     Reason for Consult/ Chief Complaint:   Shock requiring vasopressor    HPI:  Balbir Khan is a 67 y.o. male with history of CAD status post CABG, hypertension, hyperlipidemia, diabetes, vascular disease with history of BKA presented to the ED for shortness of breath.  He was found to be hypoxic and was found to have pulmonary edema on chest x-ray.  He was initiated on IV diuresis.  Cardiology consulted.  Echo was obtained which showed reduced ejection fraction, 35%.  Overnight patient's blood pressure dropped with EKG changes concerning for A-fib with PVCs.  He was given a 500 cc bolus without resolution.  He was transferred to the ICU and initiated on norepinephrine.  Levophed has been slowly weaned off around 6:00 this morning.  Patient has had good urine output with 3.3 L over the last 24 hours.  He is feeling much better this morning.  He is able to transition from 3 L nasal cannula to room air.  No other acute issues at this time.    Review of Systems  Constitutional symptoms:  Denied complaints   Ear, nose, throat: Denied complaints  Cardiovascular:  Denied complaints  Respiratory: +shortness of breath; Denied complaints  Gastrointestinal: Denied complaints  Musculoskeletal: Denied complaints  Genitourinary: Denied complaints  Allergy / Immunology: Denied complaints  Hematologic: Denied complaints  Neurologic: Denied complaints  Skin: Denied complaints  Endocrine: Denied complaints  Psychiatric: Denied complaints      Personal History     Past Medical History:   Diagnosis Date   • Diabetes mellitus    • Hyperlipidemia    • Hypertension    • Macular degeneration    • Myocardial infarction        Past Surgical History:   Procedure Laterality Date   • BELOW  KNEE AMPUTATION Bilateral    • CATARACT EXTRACTION     • CORONARY ARTERY BYPASS GRAFT     • KNEE SURGERY Bilateral        Family History: family history is not on file. Otherwise pertinent FHx was reviewed and not pertinent to current issue.    Social History:  reports that he has never smoked. He has never used smokeless tobacco. He reports that he does not drink alcohol and does not use drugs.    Home Medications:  acetaminophen, aspirin, atorvastatin, glucagon, insulin aspart, lactobacillus, lisinopril, metFORMIN, oxyCODONE, pregabalin, and psyllium    Allergies:  Allergies   Allergen Reactions   • Ibuprofen Unknown - Low Severity       Objective    Objective     Vitals:   Temp:  [97.3 °F (36.3 °C)-98.8 °F (37.1 °C)] 98 °F (36.7 °C)  Heart Rate:  [56-87] 58  Resp:  [18-20] 18  BP: ()/() 99/57  Flow (L/min):  [2.5-3] 3    Physical Exam:  Vital Signs Reviewed   General:  WDWN, Alert, NAD. Sitting up in bed, family members at bedside   HEENT:  PERRL, EOMI.  OP, nares clear, no sinus tenderness  Neck:  Supple, no JVD, no thyromegaly  Lymph: no axillary, cervical, supraclavicular lymphadenopathy noted bilaterally  Chest:   Diminished breath sounds on auscultation bilaterally, no work of breathing noted on 3L  CV: RRR, no MGR, pulses 2+, equal.  Abd:  Soft, NT, ND, + BS  EXT:  no clubbing, no cyanosis, no edema, no joint tenderness  Neuro:  A&Ox3, CN grossly intact, no focal deficits.  Skin: No rashes or lesions noted    Result Review    Result Review:  I have personally reviewed the results from the time of this admission to 5/5/2023 12:44 EDT and agree with these findings:  [x]  Laboratory  [x]  Microbiology  [x]  Radiology  []  EKG/Telemetry   []  Cardiology/Vascular   []  Pathology  []  Old records  []  Other:    Most notable findings include:   -     Assessment & Plan   Assessment / Plan     Active Hospital Problems:  Active Hospital Problems    Diagnosis    • **Acute on chronic congestive heart  failure, unspecified heart failure type      Impression:  Shock  Acute hypoxic respiratory failure  Paroxysmal A-fib  Right-sided BKA (4/20)  Hyperglycemia  Hypokalemia  Hypomagnesemia  Concern for sleep apnea  History of left-sided BKA  History of pseudomonal urinary tract infection (4/23)    Plan:  -Currently on 3 L nasal cannula, continue to wean as tolerated to keep SPO2 greater than 90%  -That has been off since 6:30 AM; MAP goal 65  -Continue diuresis with Lasix 40 mg p.o. twice daily; accurate I's and O's  -Continue aspirin, statin, Coreg, ACEi, Entresto; cardiology following, appreciate assistance  -Patient may have sleep apnea as he desats at night with new snoring; will start CPAP at night; will need sleep study outpatient  -Pain control per primary  -DVT prophylaxis with heparin subcu  -No GI prophylaxis needed as patient is tolerating p.o.  -PT/OT as tolerated    DVT prophylaxis:  Medical DVT prophylaxis orders are present.     Code Status and Medical Interventions:   Ordered at: 04/28/23 1350     Level Of Support Discussed With:    Patient     Code Status (Patient has no pulse and is not breathing):    CPR (Attempt to Resuscitate)     Medical Interventions (Patient has pulse or is breathing):    Full Support      Patient is critically ill with shock requiring vasopressor, heart failure exacerbation, hypoxic respiratory failure requiring supplemental oxygen, recent right-sided BKA. 32 minutes of critical care time was spent managing this patient, excluding procedures. This included personally reviewing all pertinent labs, imaging, microbiology and documentation. Also discussing the case with the patient and any available family, the admitting physician and any available ancillary staff.     Electronically signed by Felipe Palacios MD, 05/05/23, 12:49 PM EDT.

## 2023-05-05 NOTE — THERAPY TREATMENT NOTE
Patient Name: Balbir Khan  : 1955    MRN: [id number]                              Today's Date: 2023       Admit Date: 2023    Visit Dx:     ICD-10-CM ICD-9-CM   1. Acute on chronic congestive heart failure, unspecified heart failure type  I50.9 428.0   2. Decreased activities of daily living (ADL)  Z78.9 V49.89   3. Acute HFrEF (heart failure with reduced ejection fraction)  I50.21 428.21   4. Difficulty in walking  R26.2 719.7     Patient Active Problem List   Diagnosis   • Acute on chronic congestive heart failure, unspecified heart failure type     Past Medical History:   Diagnosis Date   • Diabetes mellitus    • Hyperlipidemia    • Hypertension    • Macular degeneration    • Myocardial infarction      Past Surgical History:   Procedure Laterality Date   • BELOW KNEE AMPUTATION Bilateral    • CATARACT EXTRACTION     • CORONARY ARTERY BYPASS GRAFT     • KNEE SURGERY Bilateral       General Information     Row Name 23 1341          OT Time and Intention    Document Type therapy note (daily note)  -     Mode of Treatment individual therapy;occupational therapy  -           User Key  (r) = Recorded By, (t) = Taken By, (c) = Cosigned By    Initials Name Provider Type     Gina Cervantes OT Occupational Therapist                 Mobility/ADL's    No documentation.                Obj/Interventions     Row Name 23 1342          Shoulder (Therapeutic Exercise)    Shoulder (Therapeutic Exercise) strengthening exercise  -     Shoulder Strengthening (Therapeutic Exercise) bilateral;resistance band;yellow;15 repititions;2 sets  Shoulder flexion/extension, forward reach, and horizontal abduction/adduction  -     Row Name 23 1342          Elbow/Forearm (Therapeutic Exercise)    Elbow/Forearm (Therapeutic Exercise) strengthening exercise  -     Elbow/Forearm Strengthening (Therapeutic Exercise) bilateral;flexion;extension;resistance band;yellow;15 repititions;2 sets  -     Row  Name 05/05/23 1342          Motor Skills    Motor Skills functional endurance  -LF     Functional Endurance Fair  -LF     Therapeutic Exercise shoulder;elbow/forearm  -LF           User Key  (r) = Recorded By, (t) = Taken By, (c) = Cosigned By    Initials Name Provider Type    Gina Adams OT Occupational Therapist               Goals/Plan    No documentation.                Clinical Impression     Row Name 05/05/23 1342          Pain Assessment    Additional Documentation Pain Scale: FACES Pre/Post-Treatment (Group)  -LF     Row Name 05/05/23 1342          Pain Scale: FACES Pre/Post-Treatment    Pain: FACES Scale, Pretreatment 0-->no hurt  -LF     Posttreatment Pain Rating 0-->no hurt  -LF     Row Name 05/05/23 1342          Plan of Care Review    Plan of Care Reviewed With patient;spouse  -LF     Progress improving  -     Outcome Evaluation Functional transfers deferred due to low BP but patient agreeable to BUE exercises in bed, demonstrating fair endurance. He would benefit from continued OT services to maximize independence.  -     Row Name 05/05/23 1342          Vital Signs    O2 Delivery Pre Treatment nasal cannula  -LF     O2 Delivery Intra Treatment nasal cannula  -LF     O2 Delivery Post Treatment nasal cannula  -LF           User Key  (r) = Recorded By, (t) = Taken By, (c) = Cosigned By    Initials Name Provider Type    Gina Adams OT Occupational Therapist               Outcome Measures     Row Name 05/05/23 1352          How much help from another is currently needed...    Putting on and taking off regular lower body clothing? 2  -LF     Bathing (including washing, rinsing, and drying) 2  -LF     Toileting (which includes using toilet bed pan or urinal) 1  -LF     Putting on and taking off regular upper body clothing 3  -LF     Taking care of personal grooming (such as brushing teeth) 3  -LF     Eating meals 3  -LF     AM-PAC 6 Clicks Score (OT) 14  -LF     Row Name 05/05/23 9343           How much help from another person do you currently need...    Turning from your back to your side while in flat bed without using bedrails? 3  -AS     Moving from lying on back to sitting on the side of a flat bed without bedrails? 3  -AS     Moving to and from a bed to a chair (including a wheelchair)? 2  -AS     Standing up from a chair using your arms (e.g., wheelchair, bedside chair)? 2  -AS     Climbing 3-5 steps with a railing? 1  -AS     To walk in hospital room? 1  -AS     AM-PAC 6 Clicks Score (PT) 12  -AS     Highest level of mobility 4 --> Transferred to chair/commode  -AS     Row Name 05/05/23 1352          Functional Assessment    Outcome Measure Options AM-PAC 6 Clicks Daily Activity (OT);Optimal Instrument  -LF     Row Name 05/05/23 1352          Optimal Instrument    Optimal Instrument Optimal - 3  -LF     Bending/Stooping 3  -LF     Standing 4  -LF     Reaching 2  -LF     From the list, choose the 3 activities you would most like to be able to do without any difficulty Bending/stooping;Standing;Reaching  -LF     Total Score Optimal - 3 9  -LF           User Key  (r) = Recorded By, (t) = Taken By, (c) = Cosigned By    Initials Name Provider Type    AS Robina King, RN Registered Nurse    Gina Adams OT Occupational Therapist                Occupational Therapy Education     Title: PT OT SLP Therapies (Done)     Topic: Occupational Therapy (Done)     Point: ADL training (Done)     Description:   Instruct learner(s) on proper safety adaptation and remediation techniques during self care or transfers.   Instruct in proper use of assistive devices.              Learning Progress Summary           Patient Acceptance, E,TB, VU,NR by BE at 5/3/2023 1830    Acceptance, E,D, NR by PG at 5/1/2023 0930                   Point: Home exercise program (Done)     Description:   Instruct learner(s) on appropriate technique for monitoring, assisting and/or progressing therapeutic  exercises/activities.              Learning Progress Summary           Patient Acceptance, E,TB, VU,NR by BE at 5/3/2023 1830    Acceptance, E,D, NR by PG at 5/1/2023 0930                   Point: Precautions (Done)     Description:   Instruct learner(s) on prescribed precautions during self-care and functional transfers.              Learning Progress Summary           Patient Acceptance, E,TB, VU,NR by BE at 5/3/2023 1830    Acceptance, E,D, NR by PG at 5/1/2023 0930                   Point: Body mechanics (Done)     Description:   Instruct learner(s) on proper positioning and spine alignment during self-care, functional mobility activities and/or exercises.              Learning Progress Summary           Patient Acceptance, E,TB, VU,NR by BE at 5/3/2023 1830    Acceptance, E,D, NR by PG at 5/1/2023 0930                               User Key     Initials Effective Dates Name Provider Type Discipline    PG 06/16/21 -  Ronaldo Townsend OT Occupational Therapist OT    BE 01/16/23 -  Cara Metzger RN Registered Nurse Nurse              OT Recommendation and Plan     Plan of Care Review  Plan of Care Reviewed With: patient, spouse  Progress: improving  Outcome Evaluation: Functional transfers deferred due to low BP but patient agreeable to BUE exercises in bed, demonstrating fair endurance. He would benefit from continued OT services to maximize independence.     Time Calculation:    Time Calculation- OT     Row Name 05/05/23 1352             Time Calculation- OT    OT Received On 05/05/23  -LF      OT Goal Re-Cert Due Date 05/10/23  -LF         Timed Charges    28959 - OT Therapeutic Exercise Minutes 15  -LF         Total Minutes    Timed Charges Total Minutes 15  -LF       Total Minutes 15  -LF            User Key  (r) = Recorded By, (t) = Taken By, (c) = Cosigned By    Initials Name Provider Type    LF Gina Cervantes OT Occupational Therapist              Therapy Charges for Today     Code Description Service Date  Service Provider Modifiers Qty    [id number] HC OT THER PROC EA 15 MIN 5/5/2023 Gina Cervantes, JOCELIN GO 1               Gina Cervantes OT  5/5/2023

## 2023-05-05 NOTE — SIGNIFICANT NOTE
05/05/23 1422   Plan   Plan [facility] requesting for patient to admit Monday as long as stable. MD notified. Spouse has also been notified.

## 2023-05-05 NOTE — SIGNIFICANT NOTE
05/05/23 1100   Coping/Psychosocial   Observed Emotional State calm;cooperative   Verbalized Emotional State hopefulness   Trust Relationship/Rapport empathic listening provided   Family/Support Persons spouse   Involvement in Care interacting with patient   Additional Documentation Spiritual Care (Group)   Spiritual Care   Use of Spiritual Resources non-Anglican use of spiritual care   Spiritual Care Source  initiative   Spiritual Care Follow-Up follow-up, none required as presently assessed   Response to Spiritual Care receptive of support;engaged in conversation   Spiritual Care Interventions supportive conversation provided   Spiritual Care Visit Type initial   Receptivity to Spiritual Care visit welcomed

## 2023-05-05 NOTE — PROGRESS NOTES
[facility]     Cardiology Progress Note    Patient Name: Balbir Khan  : 1955  MRN: [id number]  Primary Care Physician:  Rom aBrrett PA-C  Date of admission: 2023    Subjective   Subjective   Chief complaint  Shortness of breath    HPI:  Patient Reports overall he feels fine this morning.  Apparently last night he had episodes of hypotension requiring norepinephrine.  He also had ventricular bigeminy.  He notes no chest pain at all.  He states he was very sleepy yesterday but did not feel overall any different.    Review of Systems   All systems were reviewed and negative except for: Cardiac review of systems negative.    Objective   Objective     Vitals:   Temp:  [97.3 °F (36.3 °C)-98.8 °F (37.1 °C)] 98 °F (36.7 °C)  Heart Rate:  [56-87] 58  Resp:  [13-20] 13  BP: ()/() 99/57  Flow (L/min):  [2.5-3] 3  Physical Exam   Neck: no JVD, no bruit  Lungs: clear to ausculation bilaterally.  No crackles or wheezes  CV: regular rate and rhythm, no murmur  Ext: no cyanosis, clubbing or edema.  Normal bilateral LE pulses.      Scheduled Meds:aspirin, 81 mg, Oral, BID  atorvastatin, 80 mg, Oral, Nightly  carvedilol, 6.25 mg, Oral, BID With Meals  furosemide, 40 mg, Oral, BID  heparin (porcine), 5,000 Units, Subcutaneous, Q8H  hydrocortisone, 1 application, Topical, Q12H  insulin detemir, 5 Units, Subcutaneous, Nightly  insulin lispro, 2-7 Units, Subcutaneous, TID With Meals  pregabalin, 75 mg, Oral, BID  sacubitril-valsartan, 1 tablet, Oral, Q12H  sodium chloride, 10 mL, Intravenous, Q12H      Continuous Infusions:norepinephrine, 0.02-0.3 mcg/kg/min, Last Rate: 0.02 mcg/kg/min (23 1110)           Result Review    Result Review:  I have personally reviewed the results from the time of this admission to 2023 13:59 EDT and agree with these findings:  [x]  Laboratory  []  Microbiology  [x]  Radiology  [x]  EKG/Telemetry   [x]  Cardiology/Vascular   []  Pathology  []  Old  records  []  Other:  Most notable findings include:     CBC        5/3/2023    04:29 5/4/2023    04:04 5/5/2023    02:35   CBC   WBC 6.76   8.30   7.64     RBC 3.09   3.15   2.94     Hemoglobin 8.8   9.0   8.4     Hematocrit 28.2   28.8   26.8     MCV 91.3   91.4   91.2     MCH 28.5   28.6   28.6     MCHC 31.2   31.3   31.3     RDW 15.9   16.2   16.1     Platelets 376   404   378       CMP        5/3/2023    04:29 5/4/2023    04:04 5/4/2023    20:57 5/5/2023    02:35   CMP   Glucose 176   169   227   240     BUN 22   30   24   23     Creatinine 0.68   0.93   0.77   0.68     EGFR 102.5   90.6   98.7   102.5     Sodium 137   137   137   137     Potassium 4.7   5.3   4.0   3.8     Chloride 101   99   100   101     Calcium 8.4   8.5   8.5   8.5     BUN/Creatinine Ratio 32.4   32.3   31.2   33.8     Anion Gap 5.3   6.8   6.0   7.7        CARDIAC LABS:      Lab 05/04/23 2249 05/04/23 2057   HSTROP T 117* 127*        Assessment & Plan   Assessment / Plan     Brief Patient Summary:  Balbir Khan is a 67 y.o. male who has a history of coronary artery disease status post CABG and severe peripheral arterial disease status post previous below the knee amputation and then recent other side below the knee amputation 10 days ago.  He presents with signs of acute, congestive heart failure.  He has not seen a cardiologist in a long time so we do not know exactly what his ejection fraction has been running since the bypass in 2012.    Active Hospital Problems:  Active Hospital Problems    Diagnosis    • **Acute on chronic congestive heart failure, unspecified heart failure type        Assessment:  1.  Acute, systolic congestive heart failure  2.  History of coronary artery disease  3.  Hypertension  4.  Hyperlipidemia  5.  Severe peripheral arterial disease status post bilateral below the knee amputation.     Plan:   1.  Continue aspirin 81 daily.  2.  Agree with the Lasix 40.  Patient had a beta natruretic peptide of 24,000 and a  chest x-ray that showed pulmonary edema.  His echocardiogram shows an ejection fraction of about 35±5% with global hypokinesis.  I do not know if his EF is been running this way for a long time and this recent surgery may be he got excessive IV fluids which threw him into this or if this is an acute change in his ejection fraction as he does not follow with a cardiologist.    3.  Increased Coreg to 6.25 mg p.o. twice daily and added Entresto.    4.  Continue the statin.  5.  I would just watch patient's ejection fraction over the next several months.  If it does not improve we could consider ischemic study at that time.  He does have global hypokinesis.   6.  Looked through patient's telemetry yesterday and basically all I can really see is some ventricular bigeminy and ventricular couplets and triplets.  The patient was apparently hypotensive last night and put on Levophed but he is completely off of it today and his blood pressures 120s over 60s range.  Would just continue to watch closely as the patient notes no symptoms at this time or last night.    CODE STATUS:   Level Of Support Discussed With: Patient  Code Status (Patient has no pulse and is not breathing): CPR (Attempt to Resuscitate)  Medical Interventions (Patient has pulse or is breathing): Full Support      Electronically signed by Caleb Soto MD, 05/05/23, 1:59 PM EDT.

## 2023-05-05 NOTE — CONSULTS
"Nutrition Services    Patient Name: Balbir Khan  YOB: 1955  MRN: [id number]  Admission date: 4/28/2023      CLINICAL NUTRITION ASSESSMENT      Reason for Assessment  Follow-up protocol     H&P:    Past Medical History:   Diagnosis Date   • Diabetes mellitus    • Hyperlipidemia    • Hypertension    • Macular degeneration    • Myocardial infarction         Current Problems:   Active Hospital Problems    Diagnosis    • **Acute on chronic congestive heart failure, unspecified heart failure type         Nutrition/Diet History         Narrative     Nutrition follow up. Pt continues receiving Dioni BID. Pt's intake averages 50%. Will be moving to the floor today. RD to order ONS BID to promote nutrient intake at this time. RD will follow up for tolerance.       Anthropometrics        Current Height, Weight Height: 152.4 cm (60\")  Weight: 73.1 kg (161 lb 2.5 oz)   Current BMI Body mass index is 31.47 kg/m².       Weight Hx  Wt Readings from Last 30 Encounters:   05/05/23 0600 73.1 kg (161 lb 2.5 oz)   05/03/23 0255 63.4 kg (139 lb 12.4 oz)   05/02/23 0349 66.5 kg (146 lb 9.7 oz)   05/01/23 0555 66.3 kg (146 lb 2.6 oz)   04/30/23 0444 75.3 kg (166 lb 0.1 oz)   04/29/23 0600 77.4 kg (170 lb 10.2 oz)   04/28/23 0916 77.7 kg (171 lb 4.8 oz)            Wt Change Observation -11.4 kg since admission  Consistent with -10.8L fluid balance     Estimated/Assessed Needs       Energy Requirements 30 kcal/kg    EST Needs (kcal/day) 1989 kcal        Protein Requirements 1.2-1.5 g/kg    EST Daily Needs (g/day) 80-99 g        Fluid Requirements 30 ml/kg     Estimated Needs (mL/day) 1989 ml      Labs/Medications         Pertinent Labs Reviewed.   Results from last 7 days   Lab Units 05/05/23  0235 05/04/23 2057 05/04/23  0404 04/30/23  0404 04/29/23  0433   SODIUM mmol/L 137 137 137   < > 138   POTASSIUM mmol/L 3.8 4.0 5.3*   < > 3.7   CHLORIDE mmol/L 101 100 99   < > 103   CO2 mmol/L 28.3 31.0* 31.2*   < > 28.5   BUN mg/dL " 23 24* 30*   < > 12   CREATININE mg/dL 0.68* 0.77 0.93   < > 0.63*   CALCIUM mg/dL 8.5* 8.5* 8.5*   < > 8.0*   BILIRUBIN mg/dL  --   --   --   --  0.2   ALK PHOS U/L  --   --   --   --  73   ALT (SGPT) U/L  --   --   --   --  <5   AST (SGOT) U/L  --   --   --   --  12   GLUCOSE mg/dL 240* 227* 169*   < > 107*    < > = values in this interval not displayed.     Results from last 7 days   Lab Units 05/05/23  0235 05/04/23 2057 05/04/23  0404   MAGNESIUM mg/dL 1.7 1.7 2.0   PHOSPHORUS mg/dL 3.7  --  2.9   HEMOGLOBIN g/dL 8.4*  --  9.0*   HEMATOCRIT % 26.8*  --  28.8*     No results found for: COVID19  No results found for: HGBA1C      Pertinent Medications Reviewed.     Current Nutrition Orders & Evaluation of Intake       Oral Nutrition     Current PO Diet Diet: Cardiac Diets, Diabetic Diets, Fluid Restriction (240 mL/tray) Diets; Low Sodium (2g); Consistent Carbohydrate; 2000 mL/day; Texture: Regular Texture (IDDSI 7); Fluid Consistency: Thin (IDDSI 0)   Supplement Orders Placed This Encounter      Dietary Nutrition Supplements Dioni      Dietary Nutrition Supplements Boost Pudding       Malnutrition Severity Assessment                Nutrition Diagnosis         Nutrition Dx Problem 1 Increased nutrient needs related to increased demand for protein for wound healing as evidenced by stage 3 pressure injury, recent BKA.        Nutrition Intervention         Dioni BID    Boost Pudding BID  +500 kcal, 14 g PRO/day     Medical Nutrition Therapy/Nutrition Education          Learner     Readiness Patient and Family  N/A     Method     Response N/A  N/A     Monitor/Evaluation        Monitor Per protocol, PO intake, Supplement intake, Pertinent labs, Weight, POC/GOC       Nutrition Discharge Plan         Recommend to continue Dioni until wounds are healed.        Electronically signed by:  Amy aP RD  05/05/23 11:50 EDT

## 2023-05-05 NOTE — PROGRESS NOTES
[facility]   Hospitalist Progress Note  Date: 2023  Patient Name: Balbir Khan  : 1955  MRN: [id number]  Date of admission: 2023  Consultants:   -Cardiology: Dr. Caleb Soto  -Pulmonology/Critical Care: Dr. Felipe Palacios    Subjective   Subjective     Chief Complaint: Shortness of breath    Summary:   Balbir Khan is a 67 y.o. male with CAD s/p CABG, dyslipidemia, hypertension, type 2 diabetes mellitus, vascular disease and history of BKA who presented with complaints of shortness of breath.  Patient found to be hypoxic in the ED.  Labs significant for elevated BNP and troponin.  CXR showed pulmonary vascular congestion.  Hospitalist service contacted for further evaluation and management.  IV diuresis started.  Cardiology consulted.  Echo obtained, EF: 35%.  Patient started on aspirin and Coreg.  Prior to admission Raymond catheter had been placed at [facility] due to urinary retention, this was continued during admission.  Patient transitioned to oral diuretic therapy and patient started on Entresto per cardiology.     Interval Followup:   Patient noted to have hypotension overnight and change in rhythm.  Patient required transfer to ICU level of care.  Patient started on Levophed.  This morning patient has been able to be weaned off of Levophed and was feeling better and closer to have a airvo he had been feeling yesterday morning.  However, I was contacted by nursing later in the day and patient had recurrent hypotension and required Levophed to be restarted.    Pain Medication:   -Oxycodone    Review of Systems   All systems reviewed and negative unless stated otherwise under subjective.    Objective   Objective     Vitals:   Temp:  [98 °F (36.7 °C)-98.8 °F (37.1 °C)] 98 °F (36.7 °C)  Heart Rate:  [56-87] 62  Resp:  [13-20] 17  BP: ()/() 100/58  Flow (L/min):  [2.5-3] 3  Physical Exam   Gen: No acute distress, appears older than stated age, conversant, pleasant, sitting up in  bed  Resp: Good aeration, normal respiratory effort  Card: RRR, No m/r/g  Abd: Soft, Nontender, Nondistended, + bowel sounds    Result Review    Result Review:  I have personally reviewed the results as below and agree with these findings:  []  Laboratory:   CMP        5/3/2023    04:29 5/4/2023    04:04 5/4/2023    20:57 5/5/2023    02:35   CMP   Glucose 176   169   227   240     BUN 22   30   24   23     Creatinine 0.68   0.93   0.77   0.68     EGFR 102.5   90.6   98.7   102.5     Sodium 137   137   137   137     Potassium 4.7   5.3   4.0   3.8     Chloride 101   99   100   101     Calcium 8.4   8.5   8.5   8.5     BUN/Creatinine Ratio 32.4   32.3   31.2   33.8     Anion Gap 5.3   6.8   6.0   7.7       CBC        5/3/2023    04:29 5/4/2023    04:04 5/5/2023    02:35   CBC   WBC 6.76   8.30   7.64     RBC 3.09   3.15   2.94     Hemoglobin 8.8   9.0   8.4     Hematocrit 28.2   28.8   26.8     MCV 91.3   91.4   91.2     MCH 28.5   28.6   28.6     MCHC 31.2   31.3   31.3     RDW 15.9   16.2   16.1     Platelets 376   404   378        Magnesium low.  Phosphorus within normal limits.    []  Microbiology:   [x]  Radiology: CXR imaging personally reviewed.  Minimal bibasilar opacities noted.  [x]  EKG/Telemetry:    []  Cardiology/Vascular:    []  Pathology:  []  Old records:  []  Other:    Assessment & Plan   Assessment / Plan     Assessment:  Acute heart failure with reduced ejection fraction, unknown chronicity  Acute hypoxic respiratory failure  Shock requiring vasopressor  Recent BKA  Type 2 diabetes mellitus with hyperglycemia  Therapeutic drug level monitoring, Levophed  CAD s/p CABG  Elevated troponin likely due to demand ischemia from heart failure exacerbation  Hypomagnesemia, recurrent  Urinary retention    Plan:  -Cardiology and Pulmonology/Critical Care consulted and following, appreciate assistance and recommendations in the care of this patient.  -Continue Lasix 40 mg oral twice daily  -Continue Coreg and  Entresto  -Continue Levophed  -Discontinue oxycodone as this may be contributing to hypotension  -Care discussed with cardiology.  Continue Entresto and carvedilol.  Close monitoring of patient's ejection fraction required over the next several months.  -Replace magnesium IV  -Continue Levophed to maintain MAP greater than 65, wean as tolerated  -Continue aspirin and atorvastatin   -Continue holding scheduled potassium supplementation  -Continue supplemental O2 to maintain sats greater than 90%, wean as tolerated  -Continue current insulin regimen  -Due to patient required Levophed to maintain MAP greater than 65 he continues to require ICU level of care  -Monitor electrolytes and renal function with BMP, phosphorus level and magnesium level in the AM  -Monitor WBC and Hgb with CBC in the AM  -Clinical course will dictate further management     DVT Prophylaxis: Heparin  Diet: Cardiac/Diabetic  Dispo: Remain in ICU  Code Status: Full code     Personally reviewed patients labs and imaging, discussed with patient and nurse at bedside. Discussed management with the following consultants: Cardiology and Pulmonology/Critical Care.     Part of this note may be an electronic transcription/translation of spoken language to printed text using the Dragon dictation system.    DVT prophylaxis:  Medical DVT prophylaxis orders are present.    CODE STATUS:   Level Of Support Discussed With: Patient  Code Status (Patient has no pulse and is not breathing): CPR (Attempt to Resuscitate)  Medical Interventions (Patient has pulse or is breathing): Full Support      Electronically signed by Raleigh Mariscal MD, 05/05/23, 4:46 PM EDT.

## 2023-05-05 NOTE — PLAN OF CARE
Pt transferred from PCU due to rhythm changes and hypotension. Pt received 500ml bolus, albumin & placed on low dose of norepinephrine. Pt lethargic and confused. Cardiology notified and placed no new orders.

## 2023-05-05 NOTE — PLAN OF CARE
Goal Outcome Evaluation:  Plan of Care Reviewed With: patient, spouse        Progress: improving  Outcome Evaluation: Pt became lethargic and hypotensive around 1100, levo restarted and then able to be turned off at 1330. Possibly medication related, providers notified and adjusted pain med and lyrica dosing per them. Mg and K replaced. No other issues. VSS.

## 2023-05-06 LAB
ALBUMIN SERPL-MCNC: 2.3 G/DL (ref 3.5–5.2)
ALBUMIN/GLOB SERPL: 0.7 G/DL
ALP SERPL-CCNC: 84 U/L (ref 39–117)
ALT SERPL W P-5'-P-CCNC: 10 U/L (ref 1–41)
ANION GAP SERPL CALCULATED.3IONS-SCNC: 6.8 MMOL/L (ref 5–15)
AST SERPL-CCNC: 14 U/L (ref 1–40)
BILIRUB SERPL-MCNC: 0.2 MG/DL (ref 0–1.2)
BUN SERPL-MCNC: 30 MG/DL (ref 8–23)
BUN/CREAT SERPL: 50.8 (ref 7–25)
CALCIUM SPEC-SCNC: 8.6 MG/DL (ref 8.6–10.5)
CHLORIDE SERPL-SCNC: 101 MMOL/L (ref 98–107)
CO2 SERPL-SCNC: 30.2 MMOL/L (ref 22–29)
CREAT SERPL-MCNC: 0.59 MG/DL (ref 0.76–1.27)
DEPRECATED RDW RBC AUTO: 55.2 FL (ref 37–54)
EGFRCR SERPLBLD CKD-EPI 2021: 107 ML/MIN/1.73
ERYTHROCYTE [DISTWIDTH] IN BLOOD BY AUTOMATED COUNT: 16.5 % (ref 12.3–15.4)
GLOBULIN UR ELPH-MCNC: 3.1 GM/DL
GLUCOSE BLDC GLUCOMTR-MCNC: 103 MG/DL (ref 70–99)
GLUCOSE BLDC GLUCOMTR-MCNC: 160 MG/DL (ref 70–99)
GLUCOSE BLDC GLUCOMTR-MCNC: 183 MG/DL (ref 70–99)
GLUCOSE BLDC GLUCOMTR-MCNC: 208 MG/DL (ref 70–99)
GLUCOSE BLDC GLUCOMTR-MCNC: 222 MG/DL (ref 70–99)
GLUCOSE SERPL-MCNC: 103 MG/DL (ref 65–99)
HCT VFR BLD AUTO: 30.7 % (ref 37.5–51)
HGB BLD-MCNC: 9.5 G/DL (ref 13–17.7)
MAGNESIUM SERPL-MCNC: 1.9 MG/DL (ref 1.6–2.4)
MCH RBC QN AUTO: 28.4 PG (ref 26.6–33)
MCHC RBC AUTO-ENTMCNC: 30.9 G/DL (ref 31.5–35.7)
MCV RBC AUTO: 91.6 FL (ref 79–97)
PHOSPHATE SERPL-MCNC: 3.1 MG/DL (ref 2.5–4.5)
PLATELET # BLD AUTO: 411 10*3/MM3 (ref 140–450)
PMV BLD AUTO: 11 FL (ref 6–12)
POTASSIUM SERPL-SCNC: 4.1 MMOL/L (ref 3.5–5.2)
PROT SERPL-MCNC: 5.4 G/DL (ref 6–8.5)
RBC # BLD AUTO: 3.35 10*6/MM3 (ref 4.14–5.8)
SODIUM SERPL-SCNC: 138 MMOL/L (ref 136–145)
WBC NRBC COR # BLD: 6.89 10*3/MM3 (ref 3.4–10.8)

## 2023-05-06 PROCEDURE — 25010000002 HEPARIN (PORCINE) PER 1000 UNITS: Performed by: INTERNAL MEDICINE

## 2023-05-06 PROCEDURE — 94799 UNLISTED PULMONARY SVC/PX: CPT

## 2023-05-06 PROCEDURE — 83735 ASSAY OF MAGNESIUM: CPT | Performed by: PHYSICIAN ASSISTANT

## 2023-05-06 PROCEDURE — 94660 CPAP INITIATION&MGMT: CPT

## 2023-05-06 PROCEDURE — 99233 SBSQ HOSP IP/OBS HIGH 50: CPT | Performed by: INTERNAL MEDICINE

## 2023-05-06 PROCEDURE — 84100 ASSAY OF PHOSPHORUS: CPT | Performed by: PHYSICIAN ASSISTANT

## 2023-05-06 PROCEDURE — 80053 COMPREHEN METABOLIC PANEL: CPT | Performed by: PHYSICIAN ASSISTANT

## 2023-05-06 PROCEDURE — 82948 REAGENT STRIP/BLOOD GLUCOSE: CPT

## 2023-05-06 PROCEDURE — 63710000001 INSULIN LISPRO (HUMAN) PER 5 UNITS: Performed by: INTERNAL MEDICINE

## 2023-05-06 PROCEDURE — 99233 SBSQ HOSP IP/OBS HIGH 50: CPT | Performed by: STUDENT IN AN ORGANIZED HEALTH CARE EDUCATION/TRAINING PROGRAM

## 2023-05-06 PROCEDURE — 63710000001 INSULIN DETEMIR PER 5 UNITS: Performed by: INTERNAL MEDICINE

## 2023-05-06 PROCEDURE — 85027 COMPLETE CBC AUTOMATED: CPT | Performed by: PHYSICIAN ASSISTANT

## 2023-05-06 PROCEDURE — 94761 N-INVAS EAR/PLS OXIMETRY MLT: CPT

## 2023-05-06 RX ORDER — CARVEDILOL 3.12 MG/1
3.12 TABLET ORAL 2 TIMES DAILY WITH MEALS
Status: DISCONTINUED | OUTPATIENT
Start: 2023-05-06 | End: 2023-05-10 | Stop reason: HOSPADM

## 2023-05-06 RX ADMIN — FUROSEMIDE 40 MG: 40 TABLET ORAL at 17:57

## 2023-05-06 RX ADMIN — HEPARIN SODIUM 5000 UNITS: 5000 INJECTION INTRAVENOUS; SUBCUTANEOUS at 06:06

## 2023-05-06 RX ADMIN — SODIUM CHLORIDE 250 ML: 9 INJECTION, SOLUTION INTRAVENOUS at 14:16

## 2023-05-06 RX ADMIN — HYDROCODONE BITARTRATE AND ACETAMINOPHEN 1 TABLET: 5; 325 TABLET ORAL at 20:22

## 2023-05-06 RX ADMIN — ASPIRIN 81 MG 81 MG: 81 TABLET ORAL at 08:48

## 2023-05-06 RX ADMIN — INSULIN LISPRO 3 UNITS: 100 INJECTION, SOLUTION INTRAVENOUS; SUBCUTANEOUS at 12:40

## 2023-05-06 RX ADMIN — SACUBITRIL AND VALSARTAN 1 TABLET: 24; 26 TABLET, FILM COATED ORAL at 08:48

## 2023-05-06 RX ADMIN — INSULIN LISPRO 2 UNITS: 100 INJECTION, SOLUTION INTRAVENOUS; SUBCUTANEOUS at 17:36

## 2023-05-06 RX ADMIN — FUROSEMIDE 40 MG: 40 TABLET ORAL at 08:48

## 2023-05-06 RX ADMIN — PREGABALIN 150 MG: 75 CAPSULE ORAL at 20:22

## 2023-05-06 RX ADMIN — HYDROCORTISONE ACETATE 1 APPLICATION: 1 CREAM TOPICAL at 08:48

## 2023-05-06 RX ADMIN — HEPARIN SODIUM 5000 UNITS: 5000 INJECTION INTRAVENOUS; SUBCUTANEOUS at 21:54

## 2023-05-06 RX ADMIN — CARVEDILOL 6.25 MG: 6.25 TABLET, FILM COATED ORAL at 08:48

## 2023-05-06 RX ADMIN — INSULIN DETEMIR 5 UNITS: 100 INJECTION, SOLUTION SUBCUTANEOUS at 20:22

## 2023-05-06 RX ADMIN — HEPARIN SODIUM 5000 UNITS: 5000 INJECTION INTRAVENOUS; SUBCUTANEOUS at 15:05

## 2023-05-06 RX ADMIN — ATORVASTATIN CALCIUM 80 MG: 40 TABLET, FILM COATED ORAL at 20:22

## 2023-05-06 RX ADMIN — Medication 10 ML: at 08:48

## 2023-05-06 RX ADMIN — ACETAMINOPHEN 650 MG: 325 TABLET ORAL at 08:56

## 2023-05-06 RX ADMIN — CARVEDILOL 3.12 MG: 3.12 TABLET, FILM COATED ORAL at 17:57

## 2023-05-06 RX ADMIN — PREGABALIN 150 MG: 75 CAPSULE ORAL at 08:48

## 2023-05-06 RX ADMIN — SACUBITRIL AND VALSARTAN 1 TABLET: 24; 26 TABLET, FILM COATED ORAL at 20:22

## 2023-05-06 RX ADMIN — ASPIRIN 81 MG 81 MG: 81 TABLET ORAL at 20:22

## 2023-05-06 RX ADMIN — Medication 10 ML: at 20:23

## 2023-05-06 RX ADMIN — HYDROCORTISONE ACETATE 1 APPLICATION: 1 CREAM TOPICAL at 23:45

## 2023-05-06 NOTE — PROGRESS NOTES
[facility]   Hospitalist Progress Note  Date: 2023  Patient Name: Balbir Khan  : 1955  MRN: [id number]  Date of admission: 2023  Consultants:   -Cardiology: Dr. Caleb Soto  -Pulmonology/Critical Care: Dr. Felipe Palacios    Subjective   Subjective     Chief Complaint: Shortness of breath    Summary:   Balbir Khan is a 67 y.o. male with CAD s/p CABG, dyslipidemia, hypertension, type 2 diabetes mellitus, vascular disease and history of BKA who presented with complaints of shortness of breath.  Patient found to be hypoxic in the ED.  Labs significant for elevated BNP and troponin.  CXR showed pulmonary vascular congestion.  Hospitalist service contacted for further evaluation and management.  IV diuresis started.  Cardiology consulted.  Echo obtained, EF: 35%.  Patient started on aspirin and Coreg.  Prior to admission Raymond catheter had been placed at [facility] due to urinary retention, this was continued during admission.  Patient transitioned to oral diuretic therapy and patient started on Entresto per cardiology.  Hospitalization complicated by patient having hypotension requiring transfer to ICU level of care and initiation of vasopressors.  It is suspected that patient's pain medication was contributing to this, pain medication discontinued and patient able to wean off of vasopressors.    Interval Followup:   No acute events overnight.  Patient has remained off of Levophed and blood pressure has been stable.  Patient continues on Entresto and seems to be tolerating.  Pain medication changed and blood pressure improved since changes made.  Patient denies any chest pain or shortness of breath.  Nursing with no additional acute issues to report.    Pain Medication:   -[place]    Review of Systems   All systems reviewed and negative unless stated otherwise under subjective.    Objective   Objective     Vitals:   Temp:  [97.3 °F (36.3 °C)-98.9 °F (37.2 °C)] 97.5 °F (36.4 °C)  Heart Rate:   [58-69] 61  Resp:  [11-24] 18  BP: ()/(46-98) 92/57  Flow (L/min):  [2] 2  Physical Exam   Gen: No acute distress, appears older than stated age, sitting up in bed eating breakfast, pleasant, conversant  Resp: Good aeration, no increase in work of breathing  Card: RRR, No m/r/g  Abd: Soft, Nontender, Nondistended, + bowel sounds    Result Review    Result Review:  I have personally reviewed the results as below and agree with these findings:  []  Laboratory:   CMP        5/4/2023    04:04 5/4/2023    20:57 5/5/2023    02:35 5/6/2023    03:02   CMP   Glucose 169   227   240   103     BUN 30   24   23   30     Creatinine 0.93   0.77   0.68   0.59     EGFR 90.6   98.7   102.5   107.0     Sodium 137   137   137   138     Potassium 5.3   4.0   3.8   4.1     Chloride 99   100   101   101     Calcium 8.5   8.5   8.5   8.6     Total Protein    5.4     Albumin    2.3     Globulin    3.1     Total Bilirubin    0.2     Alkaline Phosphatase    84     AST (SGOT)    14     ALT (SGPT)    10     Albumin/Globulin Ratio    0.7     BUN/Creatinine Ratio 32.3   31.2   33.8   50.8     Anion Gap 6.8   6.0   7.7   6.8       CBC        5/4/2023    04:04 5/5/2023    02:35 5/6/2023    03:02   CBC   WBC 8.30   7.64   6.89     RBC 3.15   2.94   3.35     Hemoglobin 9.0   8.4   9.5     Hematocrit 28.8   26.8   30.7     MCV 91.4   91.2   91.6     MCH 28.6   28.6   28.4     MCHC 31.3   31.3   30.9     RDW 16.2   16.1   16.5     Platelets 404   378   411        Phosphorus and magnesium within normal limits    []  Microbiology:   []  Radiology:   [x]  EKG/Telemetry:    []  Cardiology/Vascular:    []  Pathology:  []  Old records:  []  Other:    Assessment & Plan   Assessment / Plan     Assessment:  Acute heart failure with reduced ejection fraction, unknown chronicity  Acute hypoxic respiratory failure  Shock requiring vasopressor  Recent BKA  Type 2 diabetes mellitus with hyperglycemia  CAD s/p CABG  Elevated troponin likely due to demand  ischemia from heart failure exacerbation  Hypomagnesemia, improved  Urinary retention    Plan:  -Pulmonology/Critical Care consulted and following, appreciate assistance and recommendations in the care of this patient.  -Continue Lasix 40 mg oral twice daily  -Continue Coreg and Entresto  -Patient seems to be tolerating Norco from blood pressure perspective so will continue at this time  -Management discussed with Pulmonology/Critical Care team.  Patient's blood pressure has been stable and patient able to transition off of Levophed.  They are in agreement that patient is stable to be transferred out of ICU level of care.  -Continue aspirin and atorvastatin   -Continue holding scheduled potassium supplementation  -Continue supplemental O2 to maintain sats greater than 90%, wean as tolerated  -No change to insulin regimen at this time  -Patient's overall condition slightly improved compared to yesterday and since he is no longer requiring pressor support and able to transfer to ICU level of care but patient still with heart failure which is being monitored and treated and he does continue to require monitored level of care  -Will monitor electrolytes and renal function with BMP, phosphorus level and magnesium level in the AM  -Will monitor WBC and Hgb with CBC in the AM  -Clinical course will dictate further management     DVT Prophylaxis: Heparin  Diet: Cardiac/Diabetic  Dispo: Transfer out of ICU  Code Status: Full code     Personally reviewed patients labs and imaging, discussed with patient and nurse at bedside. Discussed management with the following consultants: Pulmonology/Critical Care.     Part of this note may be an electronic transcription/translation of spoken language to printed text using the Dragon dictation system.    DVT prophylaxis:  Medical DVT prophylaxis orders are present.    CODE STATUS:   Level Of Support Discussed With: Patient  Code Status (Patient has no pulse and is not breathing): CPR  (Attempt to Resuscitate)  Medical Interventions (Patient has pulse or is breathing): Full Support      Electronically signed by Raleigh Mariscal MD, 05/06/23, 11:56 AM EDT.

## 2023-05-06 NOTE — PROGRESS NOTES
[facility]     Progress Note    Patient Name: Balbir Khan  : 1955  MRN: [id number]  Primary Care Physician:  Rom Barrett PA-C  Date of admission: 2023    Subjective   Subjective       Chief Complaint:   Patient is critically ill in ICU with shock requiring vasopressor    Over last 24 hours,   Levo ordered to maintain MAP 65 or greater, used intermittently throughout the day   Patient rated pain at 8, states baseline pain is at 6  Hypotension noted shortly after as needed pain med administration  Oxy IR transition to Norco, Lyrica increased dosing and as needed Tylenol ordered for pain control    Overnight, no acute events.   No Levophed required overnight, no drops in BP noted with as needed pain medication adjustments  Did not require as needed Norco overnight    This morning,  In bed with head of bed elevated  States pain level is 8 out of 10, as needed Norco and Tylenol given by nurse  No hypotensive episodes reported  UOP 3.4 L over the past 24 hours  On room air      Review of Systems   General: Denied complaints  HEENT: Denied complaints  Respiratory: Denied complaints  Cardiovascular: Denied complaints  GI: Denied complaints  : Denied complaints  MSK: Phantom pain, musculoskeletal lower extremity pain postoperatively; otherwise denied complaints  Endocrine: Denied complaints  Hematologic: Denied complaints  Psychiatric: Denied complaints  Neurologic: Denied complaints  Skin: Denied complaints      Objective   Objective     Vitals:   Temp:  [97.3 °F (36.3 °C)-98.9 °F (37.2 °C)] 97.5 °F (36.4 °C)  Heart Rate:  [56-69] 64  Resp:  [11-24] 16  BP: ()/(46-98) 92/71  Flow (L/min):  [2] 2  Physical Exam   Vital Signs Reviewed   General:  Alert, NAD.  Sitting up in bed   HEENT:  PERRL, EOMI.    Neck:  No JVD, no thyromegaly  Lymph: no axillary, cervical, supraclavicular lymphadenopathy noted bilaterally  Chest:  Clear to auscultation bilaterally, no work of breathing noted  CV:  RRR, no M/G/R, pulses 2+  Abd:  Soft, NT, ND, +BS  EXT:  no clubbing, no cyanosis, no edema; bilateral BKA  Neuro:  A&Ox3, CN grossly intact, no focal deficits.  Skin: No rashes or lesions noted    Result Review    Result Review:  I have personally reviewed the results from the time of this admission to 5/6/2023 10:45 EDT and agree with these findings:  []  Laboratory  []  Microbiology  []  Radiology  []  EKG/Telemetry   []  Cardiology/Vascular   []  Pathology  []  Old records  []  Other:  Most notable findings include:       Lab 05/06/23  0302 05/05/23  0235 05/04/23 2057 05/04/23  0404 05/03/23  0429 05/02/23  0520 05/01/23  0439 04/30/23  0404   WBC 6.89 7.64  --  8.30 6.76  --   --   --    HEMOGLOBIN 9.5* 8.4*  --  9.0* 8.8*  --   --   --    HEMATOCRIT 30.7* 26.8*  --  28.8* 28.2*  --   --   --    PLATELETS 411 378  --  404 376  --   --   --    SODIUM 138 137 137 137 137 138 139 140   POTASSIUM 4.1 3.8 4.0 5.3* 4.7 4.3 3.9 4.2   CHLORIDE 101 101 100 99 101 102 101 102   CO2 30.2* 28.3 31.0* 31.2* 30.7* 30.5* 31.0* 33.2*   BUN 30* 23 24* 30* 22 14 15 14   CREATININE 0.59* 0.68* 0.77 0.93 0.68* 0.62* 0.59* 0.64*   GLUCOSE 103* 240* 227* 169* 176* 133* 141* 105*   CALCIUM 8.6 8.5* 8.5* 8.5* 8.4* 8.0* 8.4* 8.2*   PHOSPHORUS 3.1 3.7  --  2.9 3.0 2.4* 2.8 2.8   TOTAL PROTEIN 5.4*  --   --   --   --   --   --   --    ALBUMIN 2.3*  --   --   --   --  2.2* 2.0* 2.2*   GLOBULIN 3.1  --   --   --   --   --   --   --          Assessment & Plan   Assessment / Plan         Active Hospital Problems:  Active Hospital Problems    Diagnosis    • **Acute on chronic congestive heart failure, unspecified heart failure type      Impression:  Shock  Acute hypoxic respiratory failure  Paroxysmal A-fib  Right-sided BKA (4/20)  Hyperglycemia  Hypokalemia  Hypomagnesemia  Concern for sleep apnea  History of left-sided BKA  History of pseudomonal urinary tract infection (4/23)     Plan:  -Currently on room air.  O2 order and if needed,  titrate as needed to maintain SPO2 90% or greater  -Continue diuresis with Lasix 40 mg p.o. twice daily; accurate I's and O's  -Continue aspirin, statin, Coreg, ACEi, Entresto; cardiology following, appreciate assistance  -Patient may have sleep apnea as he desats at night with new snoring; will start CPAP at night; will need sleep study outpatient  -Oxy IR DC'd yesterday.  Norco started.  Tylenol as needed.  Lyrica increased to 150 mg twice daily.  No further hypotensive episodes over the past 12 hours.  Pain control per primary  -DVT prophylaxis with heparin subcu  -No GI prophylaxis needed as patient is tolerating p.o.  -PT/OT as tolerated    DVT prophylaxis:  Medical DVT prophylaxis orders are present.    CODE STATUS:   Level Of Support Discussed With: Patient  Code Status (Patient has no pulse and is not breathing): CPR (Attempt to Resuscitate)  Medical Interventions (Patient has pulse or is breathing): Full Support    Daniela AVILA PA spent 10 minutes in accordance with split shared billing. Electronically signed by KAVITA Flaherty, 05/06/23, 11:31 AM EDT.    This patient was seen by both a physician and a PA. Felipe AVILA MD, spent >50% of time in accordance with split shared billing. This included personally reviewing all pertinent labs, imaging, microbiology and documentation. Also discussing the case with the patient and any available family, the admitting physician and any available ancillary staff. Electronically signed by Felipe Palacios MD, 05/06/23, 4:56 PM EDT.

## 2023-05-06 NOTE — PLAN OF CARE
Problem: Fall Injury Risk  Goal: Absence of Fall and Fall-Related Injury  Outcome: Ongoing, Progressing  Intervention: Promote Injury-Free Environment  Recent Flowsheet Documentation  Taken 5/6/2023 0415 by Opal Phillips RN  Safety Promotion/Fall Prevention: safety round/check completed  Taken 5/6/2023 0200 by Opal Phillips RN  Safety Promotion/Fall Prevention: safety round/check completed  Taken 5/6/2023 0015 by Opal Phillips RN  Safety Promotion/Fall Prevention: safety round/check completed  Taken 5/5/2023 2300 by Opal Phillips RN  Safety Promotion/Fall Prevention: safety round/check completed  Taken 5/5/2023 2214 by Opal Phillips RN  Safety Promotion/Fall Prevention: safety round/check completed  Taken 5/5/2023 2100 by Opal Phillips RN  Safety Promotion/Fall Prevention: safety round/check completed  Taken 5/5/2023 2015 by Opal Phillips RN  Safety Promotion/Fall Prevention: safety round/check completed  Taken 5/5/2023 1900 by Opal Phillips RN  Safety Promotion/Fall Prevention: safety round/check completed     Problem: Adult Inpatient Plan of Care  Goal: Plan of Care Review  Outcome: Ongoing, Progressing  Goal: Patient-Specific Goal (Individualized)  Outcome: Ongoing, Progressing  Goal: Absence of Hospital-Acquired Illness or Injury  Outcome: Ongoing, Progressing  Intervention: Identify and Manage Fall Risk  Recent Flowsheet Documentation  Taken 5/6/2023 0415 by Opal Phillips RN  Safety Promotion/Fall Prevention: safety round/check completed  Taken 5/6/2023 0200 by Opal Phillips RN  Safety Promotion/Fall Prevention: safety round/check completed  Taken 5/6/2023 0015 by Opal Phillips RN  Safety Promotion/Fall Prevention: safety round/check completed  Taken 5/5/2023 2300 by Opal Phillips RN  Safety Promotion/Fall Prevention: safety round/check completed  Taken 5/5/2023 2214 by Opal Phillips RN  Safety Promotion/Fall Prevention: safety round/check completed  Taken 5/5/2023 2100 by Opal Phillips RN  Safety  Promotion/Fall Prevention: safety round/check completed  Taken 5/5/2023 2015 by Opal Phillips RN  Safety Promotion/Fall Prevention: safety round/check completed  Taken 5/5/2023 1900 by Opal Phillips RN  Safety Promotion/Fall Prevention: safety round/check completed  Intervention: Prevent Skin Injury  Recent Flowsheet Documentation  Taken 5/6/2023 0415 by Opal Phillips RN  Body Position:   weight shifting   tilted   right  Taken 5/6/2023 0200 by Opal Phillips RN  Body Position:   weight shifting   tilted   left  Taken 5/6/2023 0015 by Opal Phillips RN  Body Position:   weight shifting   supine  Taken 5/5/2023 2214 by Opal Phillips RN  Body Position:   weight shifting   tilted   right  Taken 5/5/2023 2015 by Opal Phillips RN  Body Position:   weight shifting   supine  Goal: Optimal Comfort and Wellbeing  Outcome: Ongoing, Progressing  Goal: Readiness for Transition of Care  Outcome: Ongoing, Progressing     Problem: Diabetes Comorbidity  Goal: Blood Glucose Level Within Targeted Range  Outcome: Ongoing, Progressing  Intervention: Monitor and Manage Glycemia  Recent Flowsheet Documentation  Taken 5/5/2023 2015 by Opal Phillips RN  Glycemic Management: blood glucose monitored     Problem: Heart Failure Comorbidity  Goal: Maintenance of Heart Failure Symptom Control  Outcome: Ongoing, Progressing     Problem: Hypertension Comorbidity  Goal: Blood Pressure in Desired Range  Outcome: Ongoing, Progressing     Problem: Skin Injury Risk Increased  Goal: Skin Health and Integrity  Outcome: Ongoing, Progressing  Intervention: Optimize Skin Protection  Recent Flowsheet Documentation  Taken 5/5/2023 2015 by Opal Phillips RN  Pressure Reduction Techniques: frequent weight shift encouraged  Head of Bed (HOB) Positioning: HOB at 30-45 degrees  Pressure Reduction Devices: pressure-redistributing mattress utilized     Problem: Adjustment to Illness (Heart Failure)  Goal: Optimal Coping  Outcome: Ongoing, Progressing     Problem:  Cardiac Output Decreased (Heart Failure)  Goal: Optimal Cardiac Output  Outcome: Ongoing, Progressing     Problem: Dysrhythmia (Heart Failure)  Goal: Stable Heart Rate and Rhythm  Outcome: Ongoing, Progressing     Problem: Fluid Imbalance (Heart Failure)  Goal: Fluid Balance  Outcome: Ongoing, Progressing     Problem: Functional Ability Impaired (Heart Failure)  Goal: Optimal Functional Ability  Outcome: Ongoing, Progressing     Problem: Oral Intake Inadequate (Heart Failure)  Goal: Optimal Nutrition Intake  Outcome: Ongoing, Progressing     Problem: Respiratory Compromise (Heart Failure)  Goal: Effective Oxygenation and Ventilation  Outcome: Ongoing, Progressing     Problem: Sleep Disordered Breathing (Heart Failure)  Goal: Effective Breathing Pattern During Sleep  Outcome: Ongoing, Progressing   Goal Outcome Evaluation:

## 2023-05-06 NOTE — PLAN OF CARE
Goal Outcome Evaluation:  Plan of Care Reviewed With: patient, spouse        Progress: improving  Outcome Evaluation: Pt to transfer to monitored bed. One occurence of hypotension, hospitalist notified 250ml bolus given per orders. BP improved s/p NS bolus. No other issues. Good appetite, consuming all of nutritional supplement. Tolerating wound care, pain controlled. VSS

## 2023-05-07 LAB
ALBUMIN SERPL-MCNC: 2.7 G/DL (ref 3.5–5.2)
ALBUMIN/GLOB SERPL: 1 G/DL
ALP SERPL-CCNC: 81 U/L (ref 39–117)
ALT SERPL W P-5'-P-CCNC: 16 U/L (ref 1–41)
ANION GAP SERPL CALCULATED.3IONS-SCNC: 6.2 MMOL/L (ref 5–15)
AST SERPL-CCNC: 21 U/L (ref 1–40)
BILIRUB SERPL-MCNC: 0.2 MG/DL (ref 0–1.2)
BUN SERPL-MCNC: 43 MG/DL (ref 8–23)
BUN/CREAT SERPL: 50 (ref 7–25)
CALCIUM SPEC-SCNC: 9.1 MG/DL (ref 8.6–10.5)
CHLORIDE SERPL-SCNC: 100 MMOL/L (ref 98–107)
CO2 SERPL-SCNC: 29.8 MMOL/L (ref 22–29)
CREAT SERPL-MCNC: 0.86 MG/DL (ref 0.76–1.27)
DEPRECATED RDW RBC AUTO: 54.5 FL (ref 37–54)
EGFRCR SERPLBLD CKD-EPI 2021: 95.5 ML/MIN/1.73
ERYTHROCYTE [DISTWIDTH] IN BLOOD BY AUTOMATED COUNT: 16.5 % (ref 12.3–15.4)
GLOBULIN UR ELPH-MCNC: 2.7 GM/DL
GLUCOSE BLDC GLUCOMTR-MCNC: 174 MG/DL (ref 70–99)
GLUCOSE BLDC GLUCOMTR-MCNC: 212 MG/DL (ref 70–99)
GLUCOSE BLDC GLUCOMTR-MCNC: 220 MG/DL (ref 70–99)
GLUCOSE BLDC GLUCOMTR-MCNC: 227 MG/DL (ref 70–99)
GLUCOSE SERPL-MCNC: 154 MG/DL (ref 65–99)
HCT VFR BLD AUTO: 26.3 % (ref 37.5–51)
HGB BLD-MCNC: 8.2 G/DL (ref 13–17.7)
MAGNESIUM SERPL-MCNC: 1.7 MG/DL (ref 1.6–2.4)
MCH RBC QN AUTO: 28.4 PG (ref 26.6–33)
MCHC RBC AUTO-ENTMCNC: 31.2 G/DL (ref 31.5–35.7)
MCV RBC AUTO: 91 FL (ref 79–97)
PHOSPHATE SERPL-MCNC: 2.9 MG/DL (ref 2.5–4.5)
PLATELET # BLD AUTO: 347 10*3/MM3 (ref 140–450)
PMV BLD AUTO: 10.3 FL (ref 6–12)
POTASSIUM SERPL-SCNC: 4.3 MMOL/L (ref 3.5–5.2)
PROT SERPL-MCNC: 5.4 G/DL (ref 6–8.5)
RBC # BLD AUTO: 2.89 10*6/MM3 (ref 4.14–5.8)
SODIUM SERPL-SCNC: 136 MMOL/L (ref 136–145)
WBC NRBC COR # BLD: 7.66 10*3/MM3 (ref 3.4–10.8)

## 2023-05-07 PROCEDURE — 94660 CPAP INITIATION&MGMT: CPT

## 2023-05-07 PROCEDURE — 25010000002 ALBUMIN HUMAN 25% PER 50 ML: Performed by: PHYSICIAN ASSISTANT

## 2023-05-07 PROCEDURE — 25010000002 ALBUMIN HUMAN 25% PER 50 ML: Performed by: INTERNAL MEDICINE

## 2023-05-07 PROCEDURE — 94799 UNLISTED PULMONARY SVC/PX: CPT

## 2023-05-07 PROCEDURE — 25010000002 HEPARIN (PORCINE) PER 1000 UNITS: Performed by: INTERNAL MEDICINE

## 2023-05-07 PROCEDURE — 25010000002 MAGNESIUM SULFATE 2 GM/50ML SOLUTION: Performed by: INTERNAL MEDICINE

## 2023-05-07 PROCEDURE — 99233 SBSQ HOSP IP/OBS HIGH 50: CPT | Performed by: INTERNAL MEDICINE

## 2023-05-07 PROCEDURE — 63710000001 INSULIN DETEMIR PER 5 UNITS: Performed by: INTERNAL MEDICINE

## 2023-05-07 PROCEDURE — 63710000001 INSULIN LISPRO (HUMAN) PER 5 UNITS: Performed by: INTERNAL MEDICINE

## 2023-05-07 PROCEDURE — 82948 REAGENT STRIP/BLOOD GLUCOSE: CPT

## 2023-05-07 PROCEDURE — 80053 COMPREHEN METABOLIC PANEL: CPT | Performed by: PHYSICIAN ASSISTANT

## 2023-05-07 PROCEDURE — 99231 SBSQ HOSP IP/OBS SF/LOW 25: CPT | Performed by: INTERNAL MEDICINE

## 2023-05-07 PROCEDURE — P9047 ALBUMIN (HUMAN), 25%, 50ML: HCPCS | Performed by: PHYSICIAN ASSISTANT

## 2023-05-07 PROCEDURE — 83735 ASSAY OF MAGNESIUM: CPT | Performed by: INTERNAL MEDICINE

## 2023-05-07 PROCEDURE — 94761 N-INVAS EAR/PLS OXIMETRY MLT: CPT

## 2023-05-07 PROCEDURE — 84100 ASSAY OF PHOSPHORUS: CPT | Performed by: PHYSICIAN ASSISTANT

## 2023-05-07 PROCEDURE — P9047 ALBUMIN (HUMAN), 25%, 50ML: HCPCS | Performed by: INTERNAL MEDICINE

## 2023-05-07 PROCEDURE — 85027 COMPLETE CBC AUTOMATED: CPT | Performed by: INTERNAL MEDICINE

## 2023-05-07 RX ORDER — ALBUMIN (HUMAN) 12.5 G/50ML
25 SOLUTION INTRAVENOUS ONCE
Status: COMPLETED | OUTPATIENT
Start: 2023-05-07 | End: 2023-05-07

## 2023-05-07 RX ORDER — MAGNESIUM SULFATE HEPTAHYDRATE 40 MG/ML
2 INJECTION, SOLUTION INTRAVENOUS ONCE
Status: COMPLETED | OUTPATIENT
Start: 2023-05-07 | End: 2023-05-07

## 2023-05-07 RX ADMIN — INSULIN LISPRO 3 UNITS: 100 INJECTION, SOLUTION INTRAVENOUS; SUBCUTANEOUS at 17:39

## 2023-05-07 RX ADMIN — HEPARIN SODIUM 5000 UNITS: 5000 INJECTION INTRAVENOUS; SUBCUTANEOUS at 13:14

## 2023-05-07 RX ADMIN — MAGNESIUM SULFATE HEPTAHYDRATE 2 G: 2 INJECTION, SOLUTION INTRAVENOUS at 11:31

## 2023-05-07 RX ADMIN — HYDROCODONE BITARTRATE AND ACETAMINOPHEN 1 TABLET: 5; 325 TABLET ORAL at 21:42

## 2023-05-07 RX ADMIN — PREGABALIN 150 MG: 75 CAPSULE ORAL at 21:42

## 2023-05-07 RX ADMIN — HEPARIN SODIUM 5000 UNITS: 5000 INJECTION INTRAVENOUS; SUBCUTANEOUS at 21:43

## 2023-05-07 RX ADMIN — INSULIN LISPRO 3 UNITS: 100 INJECTION, SOLUTION INTRAVENOUS; SUBCUTANEOUS at 11:29

## 2023-05-07 RX ADMIN — FUROSEMIDE 40 MG: 40 TABLET ORAL at 08:40

## 2023-05-07 RX ADMIN — HYDROCORTISONE ACETATE 1 APPLICATION: 1 CREAM TOPICAL at 08:40

## 2023-05-07 RX ADMIN — CARVEDILOL 3.12 MG: 3.12 TABLET, FILM COATED ORAL at 08:39

## 2023-05-07 RX ADMIN — INSULIN LISPRO 2 UNITS: 100 INJECTION, SOLUTION INTRAVENOUS; SUBCUTANEOUS at 07:30

## 2023-05-07 RX ADMIN — ALBUMIN HUMAN 25 G: 0.25 SOLUTION INTRAVENOUS at 14:18

## 2023-05-07 RX ADMIN — SACUBITRIL AND VALSARTAN 1 TABLET: 24; 26 TABLET, FILM COATED ORAL at 21:42

## 2023-05-07 RX ADMIN — ASPIRIN 81 MG 81 MG: 81 TABLET ORAL at 08:39

## 2023-05-07 RX ADMIN — Medication 10 ML: at 08:40

## 2023-05-07 RX ADMIN — Medication 10 ML: at 21:43

## 2023-05-07 RX ADMIN — HEPARIN SODIUM 5000 UNITS: 5000 INJECTION INTRAVENOUS; SUBCUTANEOUS at 05:31

## 2023-05-07 RX ADMIN — ALBUMIN HUMAN 25 G: 0.25 SOLUTION INTRAVENOUS at 03:52

## 2023-05-07 RX ADMIN — INSULIN DETEMIR 5 UNITS: 100 INJECTION, SOLUTION SUBCUTANEOUS at 21:43

## 2023-05-07 RX ADMIN — ACETAMINOPHEN 650 MG: 325 TABLET ORAL at 14:20

## 2023-05-07 RX ADMIN — PREGABALIN 150 MG: 75 CAPSULE ORAL at 08:39

## 2023-05-07 RX ADMIN — ATORVASTATIN CALCIUM 80 MG: 40 TABLET, FILM COATED ORAL at 21:43

## 2023-05-07 RX ADMIN — ASPIRIN 81 MG 81 MG: 81 TABLET ORAL at 21:42

## 2023-05-07 RX ADMIN — HYDROCORTISONE ACETATE 1 APPLICATION: 1 CREAM TOPICAL at 21:44

## 2023-05-07 RX ADMIN — SACUBITRIL AND VALSARTAN 1 TABLET: 24; 26 TABLET, FILM COATED ORAL at 08:40

## 2023-05-07 NOTE — PLAN OF CARE
Goal Outcome Evaluation:   Patient on BIPAP for most of the night and tolerated it well. Hypotension treated with albumin as ordered. Lashell Palm RN

## 2023-05-07 NOTE — PROGRESS NOTES
[facility]   Hospitalist Progress Note  Date: 2023  Patient Name: Balbir Khan  : 1955  MRN: [id number]  Date of admission: 2023  Consultants:   -Cardiology: Dr. Caleb Soto  -Pulmonology/Critical Care: Dr. Felipe Palacios    Subjective   Subjective     Chief Complaint: Shortness of breath    Summary:   Balbir Khan is a 67 y.o. male with CAD s/p CABG, dyslipidemia, hypertension, type 2 diabetes mellitus, vascular disease and history of BKA who presented with complaints of shortness of breath.  Patient found to be hypoxic in the ED.  Labs significant for elevated BNP and troponin.  CXR showed pulmonary vascular congestion.  Hospitalist service contacted for further evaluation and management.  IV diuresis started.  Cardiology consulted.  Echo obtained, EF: 35%.  Patient started on aspirin and Coreg.  Prior to admission Raymond catheter had been placed at [facility] due to urinary retention, this was continued during admission.  Patient transitioned to oral diuretic therapy and patient started on Entresto per cardiology.  Hospitalization complicated by patient having hypotension requiring transfer to ICU level of care and initiation of vasopressors.  It is suspected that patient's pain medication was contributing to this, pain medication discontinued and patient able to wean off of vasopressors.  Pulmonology/Critical Care team also noted that there is suspicion that patient may have sleep apnea which may have contributed to his hypotension so patient started on BiPAP therapy.    Interval Followup:   Patient wore BiPAP overnight and states that he tolerated well.  Patient also did have some hypotension overnight and received albumin, blood pressure improved after infusion.  Patient denies chest pain or shortness of breath this morning.  Nursing with no additional acute issues to report.    Pain Medication:   -[place]    Review of Systems   All systems reviewed and negative unless stated otherwise  under subjective.    Objective   Objective     Vitals:   Temp:  [97.5 °F (36.4 °C)-98.1 °F (36.7 °C)] 97.8 °F (36.6 °C)  Heart Rate:  [59-67] 63  Resp:  [14-22] 18  BP: ()/(42-75) 116/52  Flow (L/min):  [1-2] 1  Physical Exam   Gen: No acute distress, appears older than stated age, lying in bed resting comfortably, easily arousable  Resp: Good aeration, normal respiratory effort  Card: RRR, No m/r/g  Abd: Soft, Nontender, Nondistended, + bowel sounds    Result Review    Result Review:  I have personally reviewed the results as below and agree with these findings:  []  Laboratory:   CMP        5/5/2023    02:35 5/6/2023    03:02 5/7/2023    05:04   CMP   Glucose 240   103   154     BUN 23   30   43     Creatinine 0.68   0.59   0.86     EGFR 102.5   107.0   95.5     Sodium 137   138   136     Potassium 3.8   4.1   4.3     Chloride 101   101   100     Calcium 8.5   8.6   9.1     Total Protein  5.4   5.4     Albumin  2.3   2.7     Globulin  3.1   2.7     Total Bilirubin  0.2   0.2     Alkaline Phosphatase  84   81     AST (SGOT)  14   21     ALT (SGPT)  10   16     Albumin/Globulin Ratio  0.7   1.0     BUN/Creatinine Ratio 33.8   50.8   50.0     Anion Gap 7.7   6.8   6.2       CBC        5/5/2023    02:35 5/6/2023    03:02 5/7/2023    05:04   CBC   WBC 7.64   6.89   7.66     RBC 2.94   3.35   2.89     Hemoglobin 8.4   9.5   8.2     Hematocrit 26.8   30.7   26.3     MCV 91.2   91.6   91.0     MCH 28.6   28.4   28.4     MCHC 31.3   30.9   31.2     RDW 16.1   16.5   16.5     Platelets 378   411   347        Magnesium low.  Phosphorus within normal limits.    []  Microbiology:   []  Radiology:   [x]  EKG/Telemetry:    []  Cardiology/Vascular:    []  Pathology:  []  Old records:  []  Other:    Assessment & Plan   Assessment / Plan     Assessment:  Acute heart failure with reduced ejection fraction, unknown chronicity  Acute hypoxic respiratory failure  Shock requiring vasopressor  Recent BKA  Type 2 diabetes mellitus  with hyperglycemia  CAD s/p CABG  Elevated troponin likely due to demand ischemia from heart failure exacerbation  Hypomagnesemia, recurrent  Urinary retention    Plan:  -Pulmonology/Critical Care consulted and following, appreciate assistance and recommendations in the care of this patient.  -Stop Lasix, suspect patient will have to discharge on lower dose of oral Lasix  -Continue Coreg at reduced dose and Entresto.  Monitor blood pressure closely.  If patient continues to have issues with hypotension will have to have further discussion with cardiology service regarding medication recommendations.  -Management discussed with Pulmonology/Critical Care team.  Continue with BiPAP at night and with naps.  Patient will need to have formal sleep study done as an outpatient after discharge.  -Continue aspirin and atorvastatin   -Continue supplemental O2 to maintain sats greater than 90%, wean as tolerated  -Replace magnesium IV  -Continue current insulin regimen  -Raymond catheter is to remain in at time of discharge, patient will require referral to urology for urinary retention at time of discharge.  -Patient's condition continues to be tenuous given hypotension requiring intervention.  Patient's condition will need to be monitored closely.  -Monitor electrolytes and renal function with BMP, phosphorus level and magnesium level in the AM  -Monitor WBC and Hgb with CBC in the AM  -Clinical course will dictate further management     DVT Prophylaxis: Heparin  Diet: Cardiac/Diabetic  Dispo: Rehab when medically appropriate for discharge.  Code Status: Full code     Personally reviewed patients labs and imaging, discussed with patient and nurse at bedside. Discussed management with the following consultants: Pulmonology/Critical Care.     Part of this note may be an electronic transcription/translation of spoken language to printed text using the Dragon dictation system.    DVT prophylaxis:  Medical DVT prophylaxis orders are  present.    CODE STATUS:   Level Of Support Discussed With: Patient  Code Status (Patient has no pulse and is not breathing): CPR (Attempt to Resuscitate)  Medical Interventions (Patient has pulse or is breathing): Full Support      Electronically signed by Raleigh Mariscal MD, 05/07/23, 11:23 AM EDT.

## 2023-05-07 NOTE — PLAN OF CARE
Goal Outcome Evaluation:  Plan of Care Reviewed With: patient, spouse        Progress: improving  Outcome Evaluation: blood pressure low during shift, albumin given once, improvement seen. patient has been more awake and tolerating diet, wound care, with one c/o pain. medicated with prn tylenol. patient encouraged to use bipap for naps and at bed time. educated patient and family on importance of wearing bipap when sleeping.

## 2023-05-07 NOTE — PROGRESS NOTES
CARDIOLOGY PROGRESS NOTE      LOS: 9 days   Patient Care Team:  Rom Barrett PA-C as PCP - General (Physician Assistant)    Problems/Diagnoses:CHF    Subjective     Interval History: in step down. No new c/o  awaiting rehab    Review of Systems:     No chest pain    Objective     Vital Signs  Temp:  [97.5 °F (36.4 °C)-98.4 °F (36.9 °C)] 98.4 °F (36.9 °C)  Heart Rate:  [59-67] 62  Resp:  [14-18] 14  BP: ()/(42-75) 92/50    Physical Exam:    Constitutional: Cooperative, alert and oriented, well-developed, well-nourished, in no acute distress.     HENT:   Head: Normocephalic, normal hair patterns, no masses or tenderness.  Ears, Nose, and Throat: No gross abnormalities. No pallor or cyanosis. Dentition good.   Eyes: EOMS intact, PERRL, conjunctivae and lids unremarkable. Fundoscopic exam and visual fields not performed.   Neck: No palpable masses or adenopathy, no thyromegaly, no JVD, carotid pulses are full and equal bilaterally and without  Bruits.     Cardiovascular: Regular rhythm, S1 and S2 normal, no S3 or S4. Apical impulse not displaced. No murmurs, gallops, or rubs detected.     Pulmonary/Chest: Chest: normal symmetry, no tenderness to palpation, normal respiratory excursion, no intercostal retraction, no use of accessory muscles.            Pulmonary: Normal breath sounds. No rales or ronchi.    Abdominal: Abdomen soft, bowel sounds normoactive, no masses, no hepatosplenomegaly, non-tender, no bruits.     Musculoskeletal: b/l amputation legs.      Neurological: No gross motor or sensory deficits noted, affect appropriate, oriented to time, person, place.     Skin: Warm and dry to the touch, no apparent skin lesions or masses noted.     Psychiatric: He has a normal mood and affect. His behavior is normal. Judgment and thought content normal.     Results Review:    Lab Results (last 24 hours)     Procedure Component Value Units Date/Time    POC Glucose Once [[id number]]  (Abnormal) Collected:  05/07/23 1114    Specimen: Blood Updated: 05/07/23 1122     Glucose 212 mg/dL      Comment: Serial Number: [id number]:  [id number]       POC Glucose Once [[id number]]  (Abnormal) Collected: 05/07/23 0727    Specimen: Blood Updated: 05/07/23 0752     Glucose 174 mg/dL      Comment: Serial Number: [id number]:  [id number]       Magnesium [[id number]]  (Normal) Collected: 05/07/23 0504    Specimen: Blood Updated: 05/07/23 0527     Magnesium 1.7 mg/dL     Comprehensive Metabolic Panel [[id number]]  (Abnormal) Collected: 05/07/23 0504    Specimen: Blood Updated: 05/07/23 0527     Glucose 154 mg/dL      BUN 43 mg/dL      Creatinine 0.86 mg/dL      Sodium 136 mmol/L      Potassium 4.3 mmol/L      Chloride 100 mmol/L      CO2 29.8 mmol/L      Calcium 9.1 mg/dL      Total Protein 5.4 g/dL      Albumin 2.7 g/dL      ALT (SGPT) 16 U/L      AST (SGOT) 21 U/L      Alkaline Phosphatase 81 U/L      Total Bilirubin 0.2 mg/dL      Globulin 2.7 gm/dL      A/G Ratio 1.0 g/dL      BUN/Creatinine Ratio 50.0     Anion Gap 6.2 mmol/L      eGFR 95.5 mL/min/1.73     Narrative:      GFR Normal >60  Chronic Kidney Disease <60  Kidney Failure <15      Phosphorus [[id number]]  (Normal) Collected: 05/07/23 0504    Specimen: Blood Updated: 05/07/23 0527     Phosphorus 2.9 mg/dL     CBC (No Diff) [[id number]]  (Abnormal) Collected: 05/07/23 0504    Specimen: Blood Updated: 05/07/23 0510     WBC 7.66 10*3/mm3      RBC 2.89 10*6/mm3      Hemoglobin 8.2 g/dL      Hematocrit 26.3 %      MCV 91.0 fL      MCH 28.4 pg      MCHC 31.2 g/dL      RDW 16.5 %      RDW-SD 54.5 fl      MPV 10.3 fL      Platelets 347 10*3/mm3     POC Glucose Once [[id number]]  (Abnormal) Collected: 05/06/23 2021    Specimen: Blood Updated: 05/06/23 2033     Glucose 183 mg/dL      Comment: Serial Number: [id number]:  [id number]       POC Glucose Once [[id number]]  (Abnormal) Collected: 05/06/23 1653    Specimen: Blood Updated: 05/06/23 1656     Glucose 160 mg/dL       Comment: Serial Number: [id number]:  [id number]       POC Glucose Once [[id number]]  (Abnormal) Collected: 05/06/23 1354    Specimen: Blood Updated: 05/06/23 1355     Glucose 208 mg/dL      Comment: Serial Number: [id number]:  [id number]           Imaging Results (Last 24 Hours)     ** No results found for the last 24 hours. **          Medication Review:   Current Facility-Administered Medications   Medication Dose Route Frequency Provider Last Rate Last Admin   • acetaminophen (TYLENOL) tablet 650 mg  650 mg Oral Q6H PRN Felipe Palacios MD   650 mg at 05/06/23 0856   • albumin human 25 % IV SOLN 25 g  25 g Intravenous Once Raleigh Mariscal MD       • aspirin chewable tablet 81 mg  81 mg Oral BID John Sullivan MD   81 mg at 05/07/23 0839   • atorvastatin (LIPITOR) tablet 80 mg  80 mg Oral Nightly John Sullivan MD   80 mg at 05/06/23 2022   • carvedilol (COREG) tablet 3.125 mg  3.125 mg Oral BID With Meals Raleigh Mariscal MD   3.125 mg at 05/07/23 0839   • dextrose (D50W) (25 g/50 mL) IV injection 25 g  25 g Intravenous Q15 Min PRN John Sullivan MD       • dextrose (GLUTOSE) oral gel 15 g  15 g Oral Q15 Min PRN John Sullivan MD       • glucagon (GLUCAGEN) injection 1 mg  1 mg Intramuscular Q15 Min PRN John Sullivan MD       • heparin (porcine) 5000 UNIT/ML injection 5,000 Units  5,000 Units Subcutaneous Q8H John Sullivan MD   5,000 Units at 05/07/23 1314   • HYDROcodone-acetaminophen (NORCO) 5-325 MG per tablet 1 tablet  1 tablet Oral Q6H PRN Felipe Palacios MD   1 tablet at 05/06/23 2022   • hydrocortisone 1 % cream 1 application  1 application Topical Q12H Miguelito Coronado MD   1 application at 05/07/23 0840   • insulin detemir (LEVEMIR) injection 5 Units  5 Units Subcutaneous Nightly Raleigh Mariscal MD   5 Units at 05/06/23 2022   • Insulin Lispro (humaLOG) injection 2-7 Units  2-7 Units Subcutaneous TID With Meals John Sullivan MD   3 Units at 05/07/23 1129   • pregabalin (LYRICA) capsule 150 mg  150 mg Oral BID Suzanne  MD Felipe   150 mg at 05/07/23 0839   • sacubitril-valsartan (ENTRESTO) 24-26 MG tablet 1 tablet  1 tablet Oral Q12H Caleb Soto MD   1 tablet at 05/07/23 0840   • sodium chloride 0.9 % flush 10 mL  10 mL Intravenous PRN John Sullivan MD       • sodium chloride 0.9 % flush 10 mL  10 mL Intravenous PRN John Sullivan MD   10 mL at 04/29/23 0912   • sodium chloride 0.9 % flush 10 mL  10 mL Intravenous Q12H John Sullivan MD   10 mL at 05/07/23 0840   • sodium chloride 0.9 % infusion 40 mL  40 mL Intravenous PRN John Sullivan MD             Assessment & Plan   Assessment:  1.  Acute, systolic congestive heart failure  2.  History of coronary artery disease, CABG 2012  3.  Hypertension  4.  Hyperlipidemia  5.  Severe peripheral arterial disease status post bilateral below the knee amputation.    Plan:  CHF meds  Watch BP  FU Dr Mills as Op for ischemia eval.     D/w pt          Humberto Ardon MD  05/07/23  13:21 EDT

## 2023-05-07 NOTE — PLAN OF CARE
Goal Outcome Evaluation:   Bipap is new start for patient. Patient rests well on room air off v60 unit. Had to titrate pressure and rate up as patient wasn't pulling optimal volumes once he would fall asleep nor was he breathing more than 10 times a minute. After titrating and switching out patient mask he said he felt better with the machine.

## 2023-05-08 LAB
ANION GAP SERPL CALCULATED.3IONS-SCNC: 7.7 MMOL/L (ref 5–15)
BUN SERPL-MCNC: 57 MG/DL (ref 8–23)
BUN/CREAT SERPL: 59.4 (ref 7–25)
CALCIUM SPEC-SCNC: 8.6 MG/DL (ref 8.6–10.5)
CHLORIDE SERPL-SCNC: 99 MMOL/L (ref 98–107)
CO2 SERPL-SCNC: 28.3 MMOL/L (ref 22–29)
CREAT SERPL-MCNC: 0.96 MG/DL (ref 0.76–1.27)
DEPRECATED RDW RBC AUTO: 56.5 FL (ref 37–54)
EGFRCR SERPLBLD CKD-EPI 2021: 87.2 ML/MIN/1.73
ERYTHROCYTE [DISTWIDTH] IN BLOOD BY AUTOMATED COUNT: 16.5 % (ref 12.3–15.4)
GLUCOSE BLDC GLUCOMTR-MCNC: 155 MG/DL (ref 70–99)
GLUCOSE BLDC GLUCOMTR-MCNC: 164 MG/DL (ref 70–99)
GLUCOSE BLDC GLUCOMTR-MCNC: 165 MG/DL (ref 70–99)
GLUCOSE BLDC GLUCOMTR-MCNC: 199 MG/DL (ref 70–99)
GLUCOSE SERPL-MCNC: 150 MG/DL (ref 65–99)
HCT VFR BLD AUTO: 26.6 % (ref 37.5–51)
HGB BLD-MCNC: 8.2 G/DL (ref 13–17.7)
MAGNESIUM SERPL-MCNC: 2.1 MG/DL (ref 1.6–2.4)
MCH RBC QN AUTO: 28.7 PG (ref 26.6–33)
MCHC RBC AUTO-ENTMCNC: 30.8 G/DL (ref 31.5–35.7)
MCV RBC AUTO: 93 FL (ref 79–97)
PHOSPHATE SERPL-MCNC: 3 MG/DL (ref 2.5–4.5)
PLATELET # BLD AUTO: 330 10*3/MM3 (ref 140–450)
PMV BLD AUTO: 10.7 FL (ref 6–12)
POTASSIUM SERPL-SCNC: 3.7 MMOL/L (ref 3.5–5.2)
RBC # BLD AUTO: 2.86 10*6/MM3 (ref 4.14–5.8)
SODIUM SERPL-SCNC: 135 MMOL/L (ref 136–145)
WBC NRBC COR # BLD: 10.95 10*3/MM3 (ref 3.4–10.8)

## 2023-05-08 PROCEDURE — 85027 COMPLETE CBC AUTOMATED: CPT | Performed by: INTERNAL MEDICINE

## 2023-05-08 PROCEDURE — 94799 UNLISTED PULMONARY SVC/PX: CPT

## 2023-05-08 PROCEDURE — 82948 REAGENT STRIP/BLOOD GLUCOSE: CPT

## 2023-05-08 PROCEDURE — 99233 SBSQ HOSP IP/OBS HIGH 50: CPT | Performed by: INTERNAL MEDICINE

## 2023-05-08 PROCEDURE — 97110 THERAPEUTIC EXERCISES: CPT

## 2023-05-08 PROCEDURE — 83735 ASSAY OF MAGNESIUM: CPT | Performed by: INTERNAL MEDICINE

## 2023-05-08 PROCEDURE — 25010000002 HEPARIN (PORCINE) PER 1000 UNITS: Performed by: INTERNAL MEDICINE

## 2023-05-08 PROCEDURE — 94761 N-INVAS EAR/PLS OXIMETRY MLT: CPT

## 2023-05-08 PROCEDURE — 63710000001 INSULIN LISPRO (HUMAN) PER 5 UNITS: Performed by: INTERNAL MEDICINE

## 2023-05-08 PROCEDURE — 80048 BASIC METABOLIC PNL TOTAL CA: CPT | Performed by: INTERNAL MEDICINE

## 2023-05-08 PROCEDURE — 97530 THERAPEUTIC ACTIVITIES: CPT

## 2023-05-08 PROCEDURE — 63710000001 INSULIN DETEMIR PER 5 UNITS: Performed by: INTERNAL MEDICINE

## 2023-05-08 PROCEDURE — 84100 ASSAY OF PHOSPHORUS: CPT | Performed by: INTERNAL MEDICINE

## 2023-05-08 RX ORDER — HYDROCODONE BITARTRATE AND ACETAMINOPHEN 5; 325 MG/1; MG/1
1 TABLET ORAL EVERY 8 HOURS PRN
Status: DISCONTINUED | OUTPATIENT
Start: 2023-05-08 | End: 2023-05-09

## 2023-05-08 RX ADMIN — ACETAMINOPHEN 650 MG: 325 TABLET ORAL at 20:42

## 2023-05-08 RX ADMIN — Medication 10 ML: at 20:44

## 2023-05-08 RX ADMIN — SACUBITRIL AND VALSARTAN 1 TABLET: 24; 26 TABLET, FILM COATED ORAL at 10:04

## 2023-05-08 RX ADMIN — INSULIN LISPRO 2 UNITS: 100 INJECTION, SOLUTION INTRAVENOUS; SUBCUTANEOUS at 07:21

## 2023-05-08 RX ADMIN — HEPARIN SODIUM 5000 UNITS: 5000 INJECTION INTRAVENOUS; SUBCUTANEOUS at 13:33

## 2023-05-08 RX ADMIN — SACUBITRIL AND VALSARTAN 1 TABLET: 24; 26 TABLET, FILM COATED ORAL at 20:42

## 2023-05-08 RX ADMIN — INSULIN DETEMIR 5 UNITS: 100 INJECTION, SOLUTION SUBCUTANEOUS at 20:43

## 2023-05-08 RX ADMIN — Medication 10 ML: at 09:46

## 2023-05-08 RX ADMIN — ACETAMINOPHEN 650 MG: 325 TABLET ORAL at 09:45

## 2023-05-08 RX ADMIN — INSULIN LISPRO 2 UNITS: 100 INJECTION, SOLUTION INTRAVENOUS; SUBCUTANEOUS at 17:34

## 2023-05-08 RX ADMIN — PREGABALIN 150 MG: 75 CAPSULE ORAL at 10:04

## 2023-05-08 RX ADMIN — ASPIRIN 81 MG 81 MG: 81 TABLET ORAL at 09:45

## 2023-05-08 RX ADMIN — ASPIRIN 81 MG 81 MG: 81 TABLET ORAL at 20:42

## 2023-05-08 RX ADMIN — CARVEDILOL 3.12 MG: 3.12 TABLET, FILM COATED ORAL at 17:34

## 2023-05-08 RX ADMIN — PREGABALIN 150 MG: 75 CAPSULE ORAL at 20:43

## 2023-05-08 RX ADMIN — HEPARIN SODIUM 5000 UNITS: 5000 INJECTION INTRAVENOUS; SUBCUTANEOUS at 05:09

## 2023-05-08 RX ADMIN — HYDROCORTISONE ACETATE 1 APPLICATION: 1 CREAM TOPICAL at 20:43

## 2023-05-08 RX ADMIN — ATORVASTATIN CALCIUM 80 MG: 40 TABLET, FILM COATED ORAL at 20:42

## 2023-05-08 RX ADMIN — INSULIN LISPRO 2 UNITS: 100 INJECTION, SOLUTION INTRAVENOUS; SUBCUTANEOUS at 11:44

## 2023-05-08 RX ADMIN — HEPARIN SODIUM 5000 UNITS: 5000 INJECTION INTRAVENOUS; SUBCUTANEOUS at 23:07

## 2023-05-08 NOTE — PLAN OF CARE
Problem: Fall Injury Risk  Goal: Absence of Fall and Fall-Related Injury  Outcome: Ongoing, Progressing  Intervention: Promote Injury-Free Environment  Recent Flowsheet Documentation  Taken 5/8/2023 0600 by Opal Lewis RN  Safety Promotion/Fall Prevention: safety round/check completed  Taken 5/8/2023 0400 by Opal Lewis RN  Safety Promotion/Fall Prevention: safety round/check completed  Taken 5/8/2023 0200 by Opal Lewis RN  Safety Promotion/Fall Prevention: safety round/check completed  Taken 5/8/2023 0000 by Opal eLwis RN  Safety Promotion/Fall Prevention: safety round/check completed  Taken 5/7/2023 2200 by Opal Lewis RN  Safety Promotion/Fall Prevention: safety round/check completed     Problem: Adult Inpatient Plan of Care  Goal: Plan of Care Review  Outcome: Ongoing, Progressing  Goal: Patient-Specific Goal (Individualized)  Outcome: Ongoing, Progressing  Goal: Absence of Hospital-Acquired Illness or Injury  Outcome: Ongoing, Progressing  Intervention: Identify and Manage Fall Risk  Recent Flowsheet Documentation  Taken 5/8/2023 0600 by Opal Lewis RN  Safety Promotion/Fall Prevention: safety round/check completed  Taken 5/8/2023 0400 by Opal Lewis RN  Safety Promotion/Fall Prevention: safety round/check completed  Taken 5/8/2023 0200 by Opal Lewis RN  Safety Promotion/Fall Prevention: safety round/check completed  Taken 5/8/2023 0000 by Opal Lewis RN  Safety Promotion/Fall Prevention: safety round/check completed  Taken 5/7/2023 2200 by Opal Lewis RN  Safety Promotion/Fall Prevention: safety round/check completed  Goal: Optimal Comfort and Wellbeing  Outcome: Ongoing, Progressing  Goal: Readiness for Transition of Care  Outcome: Ongoing, Progressing     Problem: Diabetes Comorbidity  Goal: Blood Glucose Level Within Targeted Range  Outcome: Ongoing, Progressing     Problem: Heart Failure Comorbidity  Goal: Maintenance of Heart Failure Symptom Control  Outcome: Ongoing,  Progressing     Problem: Hypertension Comorbidity  Goal: Blood Pressure in Desired Range  Outcome: Ongoing, Progressing     Problem: Skin Injury Risk Increased  Goal: Skin Health and Integrity  Outcome: Ongoing, Progressing     Problem: Adjustment to Illness (Heart Failure)  Goal: Optimal Coping  Outcome: Ongoing, Progressing     Problem: Cardiac Output Decreased (Heart Failure)  Goal: Optimal Cardiac Output  Outcome: Ongoing, Progressing     Problem: Dysrhythmia (Heart Failure)  Goal: Stable Heart Rate and Rhythm  Outcome: Ongoing, Progressing     Problem: Fluid Imbalance (Heart Failure)  Goal: Fluid Balance  Outcome: Ongoing, Progressing     Problem: Functional Ability Impaired (Heart Failure)  Goal: Optimal Functional Ability  Outcome: Ongoing, Progressing     Problem: Oral Intake Inadequate (Heart Failure)  Goal: Optimal Nutrition Intake  Outcome: Ongoing, Progressing     Problem: Respiratory Compromise (Heart Failure)  Goal: Effective Oxygenation and Ventilation  Outcome: Ongoing, Progressing     Problem: Sleep Disordered Breathing (Heart Failure)  Goal: Effective Breathing Pattern During Sleep  Outcome: Ongoing, Progressing     Problem: Noninvasive Ventilation Acute  Goal: Effective Unassisted Ventilation and Oxygenation  Outcome: Ongoing, Progressing   Goal Outcome Evaluation:

## 2023-05-08 NOTE — PLAN OF CARE
Goal Outcome Evaluation:   Patient on 2L tolerating well. Said he felt ok to sleep on the nasal cannula tonight. Told patient if he changed his mind to call as well as that I would also be back to check on him regardless

## 2023-05-08 NOTE — PROGRESS NOTES
[facility]   Hospitalist Progress Note  Date: 2023  Patient Name: Balbir Khan  : 1955  MRN: [id number]  Date of admission: 2023  Consultants:   -Cardiology: Dr. Caleb Soto  -Pulmonology/Critical Care: Dr. Felipe Palacios    Subjective   Subjective     Chief Complaint: Shortness of breath    Summary:   Balbir Khan is a 67 y.o. male with CAD s/p CABG, dyslipidemia, hypertension, type 2 diabetes mellitus, vascular disease and history of BKA who presented with complaints of shortness of breath.  Patient found to be hypoxic in the ED.  Labs significant for elevated BNP and troponin.  CXR showed pulmonary vascular congestion.  Hospitalist service contacted for further evaluation and management.  IV diuresis started.  Cardiology consulted.  Echo obtained, EF: 35%.  Patient started on aspirin and Coreg.  Prior to admission Raymond catheter had been placed at [facility] due to urinary retention, this was continued during admission.  Patient transitioned to oral diuretic therapy and patient started on Entresto per cardiology.  Hospitalization complicated by patient having hypotension requiring transfer to ICU level of care and initiation of vasopressors.  It is suspected that patient's pain medication was contributing to this, pain medication discontinued and patient able to wean off of vasopressors.  Pulmonology/Critical Care team also noted that there is suspicion that patient may have sleep apnea which may have contributed to his hypotension so patient started on BiPAP therapy.  Patient will have a bed once he is stable for discharge    Interval Followup:   -- Patient still continues to have episodes of hypotension  -- He required albumin overnight  -- Patient refused BiPAP overnight was a little confused today but not concerning at this time  -- Low threshold to repeat ABG  -- Had to hold Coreg  -- We will need cardiology to weigh in on hypotension which seems to be transient and whether we need to  adjust medications    Pain Medication:   -[place]    Review of Systems   All systems reviewed and negative unless stated otherwise under subjective.    Objective   Objective     Vitals:   Temp:  [97.5 °F (36.4 °C)-99 °F (37.2 °C)] 97.8 °F (36.6 °C)  Heart Rate:  [62-77] 65  Resp:  [14-18] 18  BP: ()/(35-59) 101/50  Flow (L/min):  [1-2] 1  Physical Exam   Gen: No acute distress, appears older than stated age, lying in bed resting comfortably, easily arousable  Resp: Good aeration, normal respiratory effort  Card: RRR, No m/r/g  Abd: Soft, Nontender, Nondistended, + bowel sounds    Result Review    Result Review:  I have personally reviewed the results as below and agree with these findings:  []  Laboratory:   CMP        5/6/2023    03:02 5/7/2023    05:04 5/8/2023    04:23   CMP   Glucose 103   154   150     BUN 30   43   57     Creatinine 0.59   0.86   0.96     EGFR 107.0   95.5   87.2     Sodium 138   136   135     Potassium 4.1   4.3   3.7     Chloride 101   100   99     Calcium 8.6   9.1   8.6     Total Protein 5.4   5.4      Albumin 2.3   2.7      Globulin 3.1   2.7      Total Bilirubin 0.2   0.2      Alkaline Phosphatase 84   81      AST (SGOT) 14   21      ALT (SGPT) 10   16      Albumin/Globulin Ratio 0.7   1.0      BUN/Creatinine Ratio 50.8   50.0   59.4     Anion Gap 6.8   6.2   7.7       CBC        5/6/2023    03:02 5/7/2023    05:04 5/8/2023    04:23   CBC   WBC 6.89   7.66   10.95     RBC 3.35   2.89   2.86     Hemoglobin 9.5   8.2   8.2     Hematocrit 30.7   26.3   26.6     MCV 91.6   91.0   93.0     MCH 28.4   28.4   28.7     MCHC 30.9   31.2   30.8     RDW 16.5   16.5   16.5     Platelets 411   347   330        Magnesium low.  Phosphorus within normal limits.    []  Microbiology:   []  Radiology:   [x]  EKG/Telemetry:    []  Cardiology/Vascular:    []  Pathology:  []  Old records:  []  Other:    Assessment & Plan   Assessment / Plan     Assessment:  Acute heart failure with reduced ejection  fraction, unknown chronicity  Acute hypoxic respiratory failure  Shock requiring vasopressor  Recent BKA  Type 2 diabetes mellitus with hyperglycemia  CAD s/p CABG  Elevated troponin likely due to demand ischemia from heart failure exacerbation  Hypomagnesemia, recurrent  Urinary retention    Plan:  -Pulmonology/Critical Care consulted and following, appreciate assistance and recommendations in the care of this patient.  -Stop Lasix, suspect patient will have to discharge on lower dose of oral Lasix  -Continue Coreg at reduced dose and Entresto.  Monitor blood pressure closely.    -Need cardiology to weigh in on hypotension and heart failure medication  -Management discussed with Pulmonology/Critical Care team.    -Continue with BiPAP at night and with naps.  Patient will need to have formal sleep study done as an outpatient after discharge.  -Continue aspirin and atorvastatin   -Continue supplemental O2 to maintain sats greater than 90%, wean as tolerated  -Replace magnesium IV  -Continue current insulin regimen  -Raymond catheter is to remain in at time of discharge, patient will require referral to urology for urinary retention at time of discharge.  -Patient's condition continues to be tenuous given hypotension requiring intervention.  Patient's condition will need to be monitored closely.  -Monitor electrolytes and renal function with BMP, phosphorus level and magnesium level in the AM  -Monitor WBC and Hgb with CBC in the AM  -Clinical course will dictate further management     DVT Prophylaxis: Heparin  Diet: Cardiac/Diabetic  Dispo: Rehab when medically appropriate for discharge.  Still need to better understand his hypotension from a cardiology standpoint  Code Status: Full code     Personally reviewed patients labs and imaging, discussed with patient and nurse at bedside. Discussed management with the following consultants: Pulmonology/Critical Care.     Part of this note may be an electronic  transcription/translation of spoken language to printed text using the Dragon dictation system.    CODE STATUS:   Level Of Support Discussed With: Patient  Code Status (Patient has no pulse and is not breathing): CPR (Attempt to Resuscitate)  Medical Interventions (Patient has pulse or is breathing): Full Support      Electronically signed by John Sullivan MD, 05/08/23, 10:33 AM EDT.

## 2023-05-08 NOTE — THERAPY TREATMENT NOTE
Acute Care - Physical Therapy Treatment Note   Marty     Patient Name: Balbir Khan  : 1955  MRN: [id number]  Today's Date: 2023      Visit Dx:     ICD-10-CM ICD-9-CM   1. Acute on chronic congestive heart failure, unspecified heart failure type  I50.9 428.0   2. Decreased activities of daily living (ADL)  Z78.9 V49.89   3. Acute HFrEF (heart failure with reduced ejection fraction)  I50.21 428.21   4. Difficulty in walking  R26.2 719.7   5. Hypertension, unspecified type  I10 401.9     Patient Active Problem List   Diagnosis   • Acute on chronic congestive heart failure, unspecified heart failure type     Past Medical History:   Diagnosis Date   • Diabetes mellitus    • Hyperlipidemia    • Hypertension    • Macular degeneration    • Myocardial infarction      Past Surgical History:   Procedure Laterality Date   • BELOW KNEE AMPUTATION Bilateral    • CATARACT EXTRACTION     • CORONARY ARTERY BYPASS GRAFT     • KNEE SURGERY Bilateral      PT Assessment (last 12 hours)     PT Evaluation and Treatment     Row Name 23 1220          Physical Therapy Time and Intention    Subjective Information complains of;weakness;fatigue;pain  -DK     Document Type therapy note (daily note)  -DK     Mode of Treatment individual therapy;physical therapy  -DK     Patient Effort good  -DK     Symptoms Noted During/After Treatment fatigue;shortness of breath  -DK     Comment Pt is rather weak, arm tremors noted while pt sitting EOB.  Unsafe to attempt standing this session.  Old L BKA with prosthesis, new R BKE.  -DK     Row Name 23 1220          Pain    Pretreatment Pain Rating 3/10  -DK     Posttreatment Pain Rating 3/10  -DK     Pain Location - Side/Orientation Right  -DK     Pain Location generalized  -DK     Pain Location - residual limb  -DK     Pain Intervention(s) Repositioned;Ambulation/increased activity;Distraction;Therapeutic presence  -DK     Row Name 23 1220          Cognition    Affect/Mental  Status (Cognition) confused;low arousal/lethargic  -DK     Orientation Status (Cognition) oriented to;person;situation  -DK     Follows Commands (Cognition) WFL  -DK     Cognitive Function WFL  -DK     Personal Safety Interventions nonskid shoes/slippers when out of bed;supervised activity  -DK     Row Name 05/08/23 1220          Bed Mobility    Bed Mobility supine-sit-supine  -DK     All Activities, [place] (Bed Mobility) maximum assist (25% patient effort);1 person assist  -DK     Supine-Sit [place] (Bed Mobility) maximum assist (25% patient effort);1 person assist  -DK     Sit-Supine [place] (Bed Mobility) maximum assist (25% patient effort);1 person assist  -DK     Supine-Sit-Supine [place] (Bed Mobility) maximum assist (25% patient effort);1 person assist  -DK     Bed Mobility, Safety Issues decreased use of arms for pushing/pulling;decreased use of legs for bridging/pushing  -DK     Assistive Device (Bed Mobility) bed rails;draw sheet  -DK     Comment, (Bed Mobility) Pt sat EOB x 5-6 minutes with PRINCE, arm tremors, weaving while in sitting.  BM while in sitting.  He was returned to bed on alert post treatment.  -DK     Row Name 05/08/23 1220          Safety Issues, Functional Mobility    Safety Issues Affecting Function (Mobility) awareness of need for assistance;judgment;impulsivity;safety precaution awareness  -DK     Impairments Affecting Function (Mobility) balance;endurance/activity tolerance;pain;strength;shortness of breath  -DK     Cognitive Impairments, Mobility Safety/Performance attention;awareness, need for assistance;impulsivity;judgment;safety precaution awareness  -DK     Row Name 05/08/23 1220          Balance    Balance Assessment sitting static balance;sitting dynamic balance  -DK     Static Sitting Balance contact guard;1-person assist  -DK     Dynamic Sitting Balance contact guard;1-person assist  -DK     Position, Sitting Balance supported;sitting edge of bed  -DK      Balance Interventions sitting;supported;static;dynamic  -     Row Name 05/08/23 1220          Motor Skills    Motor Skills --  therapeutic exercises  -     Coordination WFL  -     Therapeutic Exercise hip;knee  -     Row Name 05/08/23 1220          Hip (Therapeutic Exercise)    Hip (Therapeutic Exercise) AAROM (active assistive range of motion)  -     Hip AAROM (Therapeutic Exercise) bilateral;flexion;extension;aBduction;aDduction;supine;10 repetitions;2 sets  -     Row Name 05/08/23 1220          Knee (Therapeutic Exercise)    Knee (Therapeutic Exercise) AAROM (active assistive range of motion)  -     Knee AAROM (Therapeutic Exercise) bilateral;flexion;extension;supine;10 repetitions;2 sets  -     Row Name             Residual Limb Assessment 04/28/23 1948 transtibial (below knee), right    Residual Limb Assessment - Properties Group Placement Date: 04/28/23  -CB Placement Time: 1948  -CB Amputation Date: 04/17/23  -CB Location: transtibial (below knee), right  -CB    Retired Residual Limb Assessment - Properties Group Placement Date: 04/28/23  -CB Placement Time: 1948  -CB Amputation Date: 04/17/23  -CB Location: transtibial (below knee), right  -CB    Retired Residual Limb Assessment - Properties Group Date first assessed: 04/28/23  -CB Time first assessed: 1948  -CB Amputation Date: 04/17/23  -CB Location: transtibial (below knee), right  -CB    Row Name             Residual Limb Assessment 04/28/23 1948 transtibial (below knee), left    Residual Limb Assessment - Properties Group Placement Date: 04/28/23  -CB, unknown  Placement Time: 1948  -CB, unknown  Location: transtibial (below knee), left  -CB    Retired Residual Limb Assessment - Properties Group Placement Date: 04/28/23  -CB, unknown  Placement Time: 1948  -CB, unknown  Location: transtibial (below knee), left  -CB    Retired Residual Limb Assessment - Properties Group Date first assessed: 04/28/23  -CB, unknown  Time first assessed:  1948  -CB, unknown  Location: transtibial (below knee), left  -CB    Row Name             Wound 04/28/23 1800 Right midline coccyx Pressure Injury    Wound - Properties Group Placement Date: 04/28/23  -CZ Placement Time: 1800  -CZ Present on Hospital Admission: Y  -CZ Side: Right  -CZ Orientation: midline  -CZ Location: coccyx  -CZ Primary Wound Type: Pressure inj  -CZ    Retired Wound - Properties Group Placement Date: 04/28/23  -CZ Placement Time: 1800  -CZ Present on Hospital Admission: Y  -CZ Side: Right  -CZ Orientation: midline  -CZ Location: coccyx  -CZ Primary Wound Type: Pressure inj  -CZ    Retired Wound - Properties Group Date first assessed: 04/28/23  -CZ Time first assessed: 1800  -CZ Present on Hospital Admission: Y  -CZ Side: Right  -CZ Location: coccyx  -CZ Primary Wound Type: Pressure inj  -CZ    Row Name             Wound 05/01/23 0940 Right leg Incision    Wound - Properties Group Placement Date: 05/01/23  -FL Placement Time: 0940  -FL Present on Hospital Admission: Y  -FL Side: Right  -FL Location: leg  -FL Primary Wound Type: Incision  -FL    Retired Wound - Properties Group Placement Date: 05/01/23  -FL Placement Time: 0940  -FL Present on Hospital Admission: Y  -FL Side: Right  -FL Location: leg  -FL Primary Wound Type: Incision  -FL    Retired Wound - Properties Group Date first assessed: 05/01/23  -FL Time first assessed: 0940  -FL Present on Hospital Admission: Y  -FL Side: Right  -FL Location: leg  -FL Primary Wound Type: Incision  -FL    Row Name 05/08/23 1220          Plan of Care Review    Plan of Care Reviewed With patient  -DK     Progress improving  -DK     Row Name 05/08/23 1220          Positioning and Restraints    Pre-Treatment Position in bed  -DK     Post Treatment Position bed  -DK     In Bed supine;call light within reach;encouraged to call for assist;exit alarm on;side rails up x3;legs elevated  -DK     Row Name 05/08/23 1220          Therapy Assessment/Plan (PT)    Rehab  Potential (PT) fair, will monitor progress closely  -DK     Criteria for Skilled Interventions Met (PT) skilled treatment is necessary  -DK     Therapy Frequency (PT) daily  -DK     Problem List (PT) problems related to;balance;cognition;mobility;range of motion (ROM);strength;pain;hearing  -DK     Activity Limitations Related to Problem List (PT) unable to ambulate safely;unable to transfer safely  -DK     Row Name 05/08/23 1220          Progress Summary (PT)    Progress Toward Functional Goals (PT) progress toward functional goals is fair  -DK           User Key  (r) = Recorded By, (t) = Taken By, (c) = Cosigned By    Initials Name Provider Type    FL Lacy Contreras, RN Registered Nurse    Tiara Padilla PTA Physical Therapist Assistant    Nichole Escamilla RN Registered Nurse    Naomy Ruiz RN Registered Nurse                  PT Recommendation and Plan  Planned Therapy Interventions (PT): balance training, bed mobility training, gait training, strengthening, transfer training  Therapy Frequency (PT): daily  Progress Summary (PT)  Progress Toward Functional Goals (PT): progress toward functional goals is fair  Plan of Care Reviewed With: patient  Progress: improving   Outcome Measures     Row Name 05/08/23 1219             How much help from another person do you currently need...    Turning from your back to your side while in flat bed without using bedrails? 2  -DK      Moving from lying on back to sitting on the side of a flat bed without bedrails? 2  -DK      Moving to and from a bed to a chair (including a wheelchair)? 2  -DK      Standing up from a chair using your arms (e.g., wheelchair, bedside chair)? 2  -DK      Climbing 3-5 steps with a railing? 1  -DK      To walk in hospital room? 1  -DK      AM-PAC 6 Clicks Score (PT) 10  -DK         Functional Assessment    Outcome Measure Options AM-PAC 6 Clicks Basic Mobility (PT)  -DK            User Key  (r) = Recorded By, (t) = Taken By, (c) =  Cosigned By    Initials Name Provider Type    Tiara Padilla PTA Physical Therapist Assistant                 Time Calculation:    PT Charges     Row Name 05/08/23 1226             Time Calculation    PT Received On 05/08/23  -DK      PT Goal Re-Cert Due Date 05/10/23  -DK         Timed Charges    08665 - PT Therapeutic Exercise Minutes 12  -DK      09669 - PT Therapeutic Activity Minutes 12  -DK         Total Minutes    Timed Charges Total Minutes 24  -DK       Total Minutes 24  -DK            User Key  (r) = Recorded By, (t) = Taken By, (c) = Cosigned By    Initials Name Provider Type    Tiara Padilla PTA Physical Therapist Assistant              Therapy Charges for Today     Code Description Service Date Service Provider Modifiers Qty    91566256065 HC PT THER PROC EA 15 MIN 5/8/2023 Tiara Jordan PTA GP 1    28774886101 HC PT THERAPEUTIC ACT EA 15 MIN 5/8/2023 Tiara Jordan PTA GP 1          PT G-Codes  Outcome Measure Options: AM-PAC 6 Clicks Basic Mobility (PT)  AM-PAC 6 Clicks Score (PT): 10  AM-PAC 6 Clicks Score (OT): 15    Tiara Jordan PTA  5/8/2023

## 2023-05-08 NOTE — PLAN OF CARE
Goal Outcome Evaluation:  Plan of Care Reviewed With: patient        Progress: no change  Outcome Evaluation: PATIENT WAS WEANED FROM 2L TO 1L THIS AM; PATIENT REFUSED BIPAP LAST NIGHT; PATIETNT TOLERATING WELL

## 2023-05-08 NOTE — THERAPY TREATMENT NOTE
Patient Name: Balbir Khan  : 1955    MRN: [id number]                              Today's Date: 2023       Admit Date: 2023    Visit Dx:     ICD-10-CM ICD-9-CM   1. Acute on chronic congestive heart failure, unspecified heart failure type  I50.9 428.0   2. Decreased activities of daily living (ADL)  Z78.9 V49.89   3. Acute HFrEF (heart failure with reduced ejection fraction)  I50.21 428.21   4. Difficulty in walking  R26.2 719.7   5. Hypertension, unspecified type  I10 401.9     Patient Active Problem List   Diagnosis   • Acute on chronic congestive heart failure, unspecified heart failure type     Past Medical History:   Diagnosis Date   • Diabetes mellitus    • Hyperlipidemia    • Hypertension    • Macular degeneration    • Myocardial infarction      Past Surgical History:   Procedure Laterality Date   • BELOW KNEE AMPUTATION Bilateral    • CATARACT EXTRACTION     • CORONARY ARTERY BYPASS GRAFT     • KNEE SURGERY Bilateral       General Information     Row Name 23 1113          OT Time and Intention    Document Type therapy note (daily note)  -PG     Mode of Treatment individual therapy;occupational therapy  -PG           User Key  (r) = Recorded By, (t) = Taken By, (c) = Cosigned By    Initials Name Provider Type    PG Ronaldo Townsend OT Occupational Therapist                 Mobility/ADL's    No documentation.                Obj/Interventions     Row Name 23 1113          Shoulder (Therapeutic Exercise)    Shoulder (Therapeutic Exercise) strengthening exercise  -PG     Shoulder Strengthening (Therapeutic Exercise) 15 repititions;resistance band;yellow;bilateral  -PG     Row Name 23 1113          Elbow/Forearm (Therapeutic Exercise)    Elbow/Forearm (Therapeutic Exercise) strengthening exercise  -PG     Elbow/Forearm Strengthening (Therapeutic Exercise) resistance band;15 repititions;bilateral;yellow  -PG     Row Name 23 1113          Motor Skills    Therapeutic Exercise  shoulder;elbow/forearm  -PG           User Key  (r) = Recorded By, (t) = Taken By, (c) = Cosigned By    Initials Name Provider Type    PG Ronaldo Townsend OT Occupational Therapist               Goals/Plan    No documentation.                Clinical Impression     Row Name 05/08/23 1113          Plan of Care Review    Progress no change  -PG           User Key  (r) = Recorded By, (t) = Taken By, (c) = Cosigned By    Initials Name Provider Type    Ronaldo Swanson OT Occupational Therapist               Outcome Measures     Row Name 05/08/23 1114          How much help from another is currently needed...    Putting on and taking off regular lower body clothing? 2  -PG     Bathing (including washing, rinsing, and drying) 2  -PG     Toileting (which includes using toilet bed pan or urinal) 1  -PG     Putting on and taking off regular upper body clothing 3  -PG     Taking care of personal grooming (such as brushing teeth) 3  -PG     Eating meals 4  -PG     AM-PAC 6 Clicks Score (OT) 15  -PG     Row Name 05/08/23 1114          Functional Assessment    Outcome Measure Options AM-PAC 6 Clicks Daily Activity (OT);Optimal Instrument  -PG     Row Name 05/08/23 1114          Optimal Instrument    Optimal Instrument Optimal - 3  -PG     Bending/Stooping 4  -PG     Standing 5  -PG     Reaching 1  -PG           User Key  (r) = Recorded By, (t) = Taken By, (c) = Cosigned By    Initials Name Provider Type    Ronaldo Swanson OT Occupational Therapist                Occupational Therapy Education     Title: PT OT SLP Therapies (Done)     Topic: Occupational Therapy (Done)     Point: ADL training (Done)     Description:   Instruct learner(s) on proper safety adaptation and remediation techniques during self care or transfers.   Instruct in proper use of assistive devices.              Learning Progress Summary           Patient Acceptance, E,TB, VU,NR by BE at 5/3/2023 1830    Acceptance, E,D, NR by PG at 5/1/2023 0908                    Point: Home exercise program (Done)     Description:   Instruct learner(s) on appropriate technique for monitoring, assisting and/or progressing therapeutic exercises/activities.              Learning Progress Summary           Patient Acceptance, E,TB, VU,NR by BE at 5/3/2023 1830    Acceptance, E,D, NR by PG at 5/1/2023 0930                   Point: Precautions (Done)     Description:   Instruct learner(s) on prescribed precautions during self-care and functional transfers.              Learning Progress Summary           Patient Acceptance, E,TB, VU,NR by BE at 5/3/2023 1830    Acceptance, E,D, NR by PG at 5/1/2023 0930                   Point: Body mechanics (Done)     Description:   Instruct learner(s) on proper positioning and spine alignment during self-care, functional mobility activities and/or exercises.              Learning Progress Summary           Patient Acceptance, E,TB, VU,NR by BE at 5/3/2023 1830    Acceptance, E,D, NR by PG at 5/1/2023 0930                               User Key     Initials Effective Dates Name Provider Type Discipline    PG 06/16/21 -  Ronaldo Townsend OT Occupational Therapist OT    BE 01/16/23 -  Cara Metzger RN Registered Nurse Nurse              OT Recommendation and Plan  Planned Therapy Interventions (OT): activity tolerance training, BADL retraining, strengthening exercise, transfer/mobility retraining, patient/caregiver education/training, occupation/activity based interventions  Therapy Frequency (OT): 5 times/wk  Plan of Care Review  Plan of Care Reviewed With: patient  Progress: no change  Outcome Evaluation: Patient presents with limitations affecting strength, activity tolerance, and balance impacting patient's ability to return home safely and independently.  The skills of a therapist will be required to safely and effectively implement the following treatment plan to restore maximal level of function     Time Calculation:    Time Calculation- OT     Row Name  05/08/23 1115             Time Calculation- OT    OT Received On 05/08/23  -PG      OT Goal Re-Cert Due Date 05/10/23  -PG         Timed Charges    98471 - OT Therapeutic Exercise Minutes 10  -PG         Total Minutes    Timed Charges Total Minutes 10  -PG       Total Minutes 10  -PG            User Key  (r) = Recorded By, (t) = Taken By, (c) = Cosigned By    Initials Name Provider Type    PG Ronaldo Townsend OT Occupational Therapist              Therapy Charges for Today     Code Description Service Date Service Provider Modifiers Qty    50475229933 HC OT THER PROC EA 15 MIN 5/8/2023 Ronaldo Townsend OT GO 1               Ronaldo Townsend OT  5/8/2023

## 2023-05-09 LAB
ALBUMIN SERPL-MCNC: 2.7 G/DL (ref 3.5–5.2)
ALBUMIN/GLOB SERPL: 1 G/DL
ALP SERPL-CCNC: 86 U/L (ref 39–117)
ALT SERPL W P-5'-P-CCNC: 27 U/L (ref 1–41)
ANION GAP SERPL CALCULATED.3IONS-SCNC: 5 MMOL/L (ref 5–15)
AST SERPL-CCNC: 28 U/L (ref 1–40)
BASOPHILS # BLD AUTO: 0.05 10*3/MM3 (ref 0–0.2)
BASOPHILS NFR BLD AUTO: 0.5 % (ref 0–1.5)
BILIRUB SERPL-MCNC: <0.2 MG/DL (ref 0–1.2)
BUN SERPL-MCNC: 45 MG/DL (ref 8–23)
BUN/CREAT SERPL: 50 (ref 7–25)
CALCIUM SPEC-SCNC: 8.7 MG/DL (ref 8.6–10.5)
CHLORIDE SERPL-SCNC: 106 MMOL/L (ref 98–107)
CO2 SERPL-SCNC: 29 MMOL/L (ref 22–29)
CREAT SERPL-MCNC: 0.9 MG/DL (ref 0.76–1.27)
DEPRECATED RDW RBC AUTO: 56.8 FL (ref 37–54)
EGFRCR SERPLBLD CKD-EPI 2021: 94.2 ML/MIN/1.73
EOSINOPHIL # BLD AUTO: 0.39 10*3/MM3 (ref 0–0.4)
EOSINOPHIL NFR BLD AUTO: 4.1 % (ref 0.3–6.2)
ERYTHROCYTE [DISTWIDTH] IN BLOOD BY AUTOMATED COUNT: 16.7 % (ref 12.3–15.4)
GLOBULIN UR ELPH-MCNC: 2.8 GM/DL
GLUCOSE BLDC GLUCOMTR-MCNC: 160 MG/DL (ref 70–99)
GLUCOSE BLDC GLUCOMTR-MCNC: 192 MG/DL (ref 70–99)
GLUCOSE BLDC GLUCOMTR-MCNC: 232 MG/DL (ref 70–99)
GLUCOSE SERPL-MCNC: 211 MG/DL (ref 65–99)
HCT VFR BLD AUTO: 26 % (ref 37.5–51)
HGB BLD-MCNC: 8 G/DL (ref 13–17.7)
IMM GRANULOCYTES # BLD AUTO: 0.03 10*3/MM3 (ref 0–0.05)
IMM GRANULOCYTES NFR BLD AUTO: 0.3 % (ref 0–0.5)
LYMPHOCYTES # BLD AUTO: 2.84 10*3/MM3 (ref 0.7–3.1)
LYMPHOCYTES NFR BLD AUTO: 29.6 % (ref 19.6–45.3)
MAGNESIUM SERPL-MCNC: 1.9 MG/DL (ref 1.6–2.4)
MCH RBC QN AUTO: 28.6 PG (ref 26.6–33)
MCHC RBC AUTO-ENTMCNC: 30.8 G/DL (ref 31.5–35.7)
MCV RBC AUTO: 92.9 FL (ref 79–97)
MONOCYTES # BLD AUTO: 0.88 10*3/MM3 (ref 0.1–0.9)
MONOCYTES NFR BLD AUTO: 9.2 % (ref 5–12)
NEUTROPHILS NFR BLD AUTO: 5.4 10*3/MM3 (ref 1.7–7)
NEUTROPHILS NFR BLD AUTO: 56.3 % (ref 42.7–76)
NRBC BLD AUTO-RTO: 0 /100 WBC (ref 0–0.2)
PLATELET # BLD AUTO: 308 10*3/MM3 (ref 140–450)
PMV BLD AUTO: 10.9 FL (ref 6–12)
POTASSIUM SERPL-SCNC: 4 MMOL/L (ref 3.5–5.2)
PROT SERPL-MCNC: 5.5 G/DL (ref 6–8.5)
RBC # BLD AUTO: 2.8 10*6/MM3 (ref 4.14–5.8)
SODIUM SERPL-SCNC: 140 MMOL/L (ref 136–145)
WBC NRBC COR # BLD: 9.59 10*3/MM3 (ref 3.4–10.8)

## 2023-05-09 PROCEDURE — 63710000001 INSULIN DETEMIR PER 5 UNITS: Performed by: INTERNAL MEDICINE

## 2023-05-09 PROCEDURE — 80053 COMPREHEN METABOLIC PANEL: CPT | Performed by: INTERNAL MEDICINE

## 2023-05-09 PROCEDURE — 99232 SBSQ HOSP IP/OBS MODERATE 35: CPT | Performed by: INTERNAL MEDICINE

## 2023-05-09 PROCEDURE — 85025 COMPLETE CBC W/AUTO DIFF WBC: CPT | Performed by: INTERNAL MEDICINE

## 2023-05-09 PROCEDURE — 94799 UNLISTED PULMONARY SVC/PX: CPT

## 2023-05-09 PROCEDURE — 97110 THERAPEUTIC EXERCISES: CPT

## 2023-05-09 PROCEDURE — 83735 ASSAY OF MAGNESIUM: CPT | Performed by: INTERNAL MEDICINE

## 2023-05-09 PROCEDURE — 97530 THERAPEUTIC ACTIVITIES: CPT

## 2023-05-09 PROCEDURE — 94761 N-INVAS EAR/PLS OXIMETRY MLT: CPT

## 2023-05-09 PROCEDURE — 82948 REAGENT STRIP/BLOOD GLUCOSE: CPT

## 2023-05-09 PROCEDURE — 63710000001 INSULIN LISPRO (HUMAN) PER 5 UNITS: Performed by: INTERNAL MEDICINE

## 2023-05-09 PROCEDURE — 25010000002 HEPARIN (PORCINE) PER 1000 UNITS: Performed by: INTERNAL MEDICINE

## 2023-05-09 RX ORDER — ASPIRIN 81 MG/1
81 TABLET, CHEWABLE ORAL DAILY
Status: DISCONTINUED | OUTPATIENT
Start: 2023-05-10 | End: 2023-05-10 | Stop reason: HOSPADM

## 2023-05-09 RX ADMIN — HEPARIN SODIUM 5000 UNITS: 5000 INJECTION INTRAVENOUS; SUBCUTANEOUS at 06:05

## 2023-05-09 RX ADMIN — HYDROCODONE BITARTRATE AND ACETAMINOPHEN 1 TABLET: 5; 325 TABLET ORAL at 17:17

## 2023-05-09 RX ADMIN — CARVEDILOL 3.12 MG: 3.12 TABLET, FILM COATED ORAL at 08:03

## 2023-05-09 RX ADMIN — ASPIRIN 81 MG 81 MG: 81 TABLET ORAL at 08:03

## 2023-05-09 RX ADMIN — HYDROCORTISONE ACETATE 1 APPLICATION: 1 CREAM TOPICAL at 22:10

## 2023-05-09 RX ADMIN — HYDROCORTISONE ACETATE 1 APPLICATION: 1 CREAM TOPICAL at 08:05

## 2023-05-09 RX ADMIN — SODIUM CHLORIDE 250 ML: 9 INJECTION, SOLUTION INTRAVENOUS at 22:10

## 2023-05-09 RX ADMIN — SACUBITRIL AND VALSARTAN 1 TABLET: 24; 26 TABLET, FILM COATED ORAL at 08:04

## 2023-05-09 RX ADMIN — SACUBITRIL AND VALSARTAN 1 TABLET: 24; 26 TABLET, FILM COATED ORAL at 22:08

## 2023-05-09 RX ADMIN — CARVEDILOL 3.12 MG: 3.12 TABLET, FILM COATED ORAL at 17:15

## 2023-05-09 RX ADMIN — HEPARIN SODIUM 5000 UNITS: 5000 INJECTION INTRAVENOUS; SUBCUTANEOUS at 13:50

## 2023-05-09 RX ADMIN — HEPARIN SODIUM 5000 UNITS: 5000 INJECTION INTRAVENOUS; SUBCUTANEOUS at 22:10

## 2023-05-09 RX ADMIN — ATORVASTATIN CALCIUM 80 MG: 40 TABLET, FILM COATED ORAL at 22:09

## 2023-05-09 RX ADMIN — INSULIN LISPRO 3 UNITS: 100 INJECTION, SOLUTION INTRAVENOUS; SUBCUTANEOUS at 17:14

## 2023-05-09 RX ADMIN — ACETAMINOPHEN 650 MG: 325 TABLET ORAL at 23:07

## 2023-05-09 RX ADMIN — PREGABALIN 150 MG: 75 CAPSULE ORAL at 22:08

## 2023-05-09 RX ADMIN — INSULIN LISPRO 2 UNITS: 100 INJECTION, SOLUTION INTRAVENOUS; SUBCUTANEOUS at 11:38

## 2023-05-09 RX ADMIN — PREGABALIN 150 MG: 75 CAPSULE ORAL at 08:03

## 2023-05-09 RX ADMIN — Medication 10 ML: at 08:06

## 2023-05-09 RX ADMIN — INSULIN LISPRO 2 UNITS: 100 INJECTION, SOLUTION INTRAVENOUS; SUBCUTANEOUS at 08:02

## 2023-05-09 RX ADMIN — INSULIN DETEMIR 5 UNITS: 100 INJECTION, SOLUTION SUBCUTANEOUS at 22:09

## 2023-05-09 RX ADMIN — Medication 10 ML: at 22:19

## 2023-05-09 NOTE — PLAN OF CARE
Goal Outcome Evaluation:   Patient refused to wear BiPAP last night. Patient rested comfortably on 1L NC, awakens to loud voice and shows no sign of distress.

## 2023-05-09 NOTE — CONSULTS
"Nutrition Services    Patient Name: Balbir Khan  YOB: 1955  MRN: [id number]  Admission date: 4/28/2023      CLINICAL NUTRITION ASSESSMENT      Reason for Assessment  Follow-up protocol     H&P:    Past Medical History:   Diagnosis Date   • Diabetes mellitus    • Hyperlipidemia    • Hypertension    • Macular degeneration    • Myocardial infarction         Current Problems:   Active Hospital Problems    Diagnosis    • **Acute on chronic congestive heart failure, unspecified heart failure type         Nutrition/Diet History         Narrative     Nutrition follow up. Pt continues receiving Dioni BID. Receiving Boost Pudding BID.  Intake has improved and patient is consuming 100% of meals.  Will continue current nutrition interventions.       Anthropometrics        Current Height, Weight Height: 152.4 cm (60\")  Weight: 71.3 kg (157 lb 3 oz)   Current BMI Body mass index is 30.7 kg/m².       Weight Hx  Wt Readings from Last 30 Encounters:   05/08/23 0500 71.3 kg (157 lb 3 oz)   05/07/23 0600 69.8 kg (153 lb 14.1 oz)   05/06/23 0600 73.5 kg (162 lb 0.6 oz)   05/05/23 0600 73.1 kg (161 lb 2.5 oz)   05/03/23 0255 63.4 kg (139 lb 12.4 oz)   05/02/23 0349 66.5 kg (146 lb 9.7 oz)   05/01/23 0555 66.3 kg (146 lb 2.6 oz)   04/30/23 0444 75.3 kg (166 lb 0.1 oz)   04/29/23 0600 77.4 kg (170 lb 10.2 oz)   04/28/23 0916 77.7 kg (171 lb 4.8 oz)            Wt Change Observation -6.4 kg since admission  -18.1L fluid balance     Estimated/Assessed Needs       Energy Requirements 30 kcal/kg    EST Needs (kcal/day) 1989 kcal        Protein Requirements 1.2-1.5 g/kg    EST Daily Needs (g/day) 80-99 g        Fluid Requirements 30 ml/kg     Estimated Needs (mL/day) 1989 ml      Labs/Medications         Pertinent Labs Reviewed.   Results from last 7 days   Lab Units 05/09/23  0517 05/08/23  0423 05/07/23  0504 05/06/23  0302   SODIUM mmol/L 140 135* 136 138   POTASSIUM mmol/L 4.0 3.7 4.3 4.1   CHLORIDE mmol/L 106 99 100 101 "   CO2 mmol/L 29.0 28.3 29.8* 30.2*   BUN mg/dL 45* 57* 43* 30*   CREATININE mg/dL 0.90 0.96 0.86 0.59*   CALCIUM mg/dL 8.7 8.6 9.1 8.6   BILIRUBIN mg/dL <0.2  --  0.2 0.2   ALK PHOS U/L 86  --  81 84   ALT (SGPT) U/L 27  --  16 10   AST (SGOT) U/L 28  --  21 14   GLUCOSE mg/dL 211* 150* 154* 103*     Results from last 7 days   Lab Units 05/09/23  0517 05/08/23  0423 05/07/23  0504   MAGNESIUM mg/dL 1.9 2.1 1.7   PHOSPHORUS mg/dL  --  3.0 2.9   HEMOGLOBIN g/dL 8.0* 8.2* 8.2*   HEMATOCRIT % 26.0* 26.6* 26.3*     No results found for: COVID19  No results found for: HGBA1C      Pertinent Medications Reviewed.     Current Nutrition Orders & Evaluation of Intake       Oral Nutrition     Current PO Diet Diet: Cardiac Diets, Diabetic Diets, Fluid Restriction (240 mL/tray) Diets; Low Sodium (2g); Consistent Carbohydrate; 2000 mL/day; Texture: Regular Texture (IDDSI 7); Fluid Consistency: Thin (IDDSI 0)   Supplement Orders Placed This Encounter      Dietary Nutrition Supplements Dioni      Dietary Nutrition Supplements Boost Pudding       Malnutrition Severity Assessment                Nutrition Diagnosis         Nutrition Dx Problem 1 Increased nutrient needs related to increased demand for protein for wound healing as evidenced by stage 3 pressure injury, recent BKA.        Nutrition Intervention         Dioni BID +180 kcal, 5 g pro     Boost Pudding BID  +500 kcal, 14 g PRO/day     Medical Nutrition Therapy/Nutrition Education          Learner     Readiness N/A  N/A     Method     Response N/A  N/A     Monitor/Evaluation        Monitor Per protocol, PO intake, Supplement intake, Pertinent labs, Weight, POC/GOC       Nutrition Discharge Plan         Recommend to continue Dinoi until wounds are healed.        Electronically signed by:  Kathleen Thomas RD  05/09/23 14:02 EDT

## 2023-05-09 NOTE — PLAN OF CARE
Goal Outcome Evaluation:                      VSS, A&O x4, no confusion observed this shift, patient sher diet and medication, medicated for pain, see MAR. Plan to d/c tomorrow

## 2023-05-09 NOTE — PLAN OF CARE
Problem: Adult Inpatient Plan of Care  Goal: Plan of Care Review  Flowsheets (Taken 5/9/2023 0934)  Progress: no change  Plan of Care Reviewed With: patient  Outcome Evaluation: Patient is doing well on 1 liter nasal cannula at this time. He refused the bipap last night.     Problem: Respiratory Compromise (Heart Failure)  Goal: Effective Oxygenation and Ventilation  Intervention: Optimize Oxygenation and Ventilation  Recent Flowsheet Documentation  Taken 5/9/2023 0934 by Gala Salomon CRT  Airway/Ventilation Management: (pt refused bipap last night and it is on standby at this time)   calming measures promoted   pulmonary hygiene promoted     Problem: Noninvasive Ventilation Acute  Goal: Effective Unassisted Ventilation and Oxygenation  Intervention: Monitor and Manage Noninvasive Ventilation  Flowsheets (Taken 5/9/2023 0934)  Airway/Ventilation Management: (pt refused bipap last night and it is on standby at this time)   calming measures promoted   pulmonary hygiene promoted   Goal Outcome Evaluation:  Plan of Care Reviewed With: patient        Progress: no change  Outcome Evaluation: Patient is doing well on 1 liter nasal cannula at this time. He refused the bipap last night.

## 2023-05-09 NOTE — PROGRESS NOTES
[facility]   Hospitalist Progress Note  Date: 2023  Patient Name: Balbir Khan  : 1955  MRN: [id number]  Date of admission: 2023  Consultants:   -Cardiology: Dr. Caelb Soto  -Pulmonology/Critical Care: Dr. Felipe Palacios    Subjective   Subjective     Chief Complaint: Shortness of breath    Summary:   Balbir Khan is a 67 y.o. male with CAD s/p CABG, dyslipidemia, hypertension, type 2 diabetes mellitus, vascular disease and history of BKA who presented with complaints of shortness of breath.  Patient found to be hypoxic in the ED.  Labs significant for elevated BNP and troponin.  CXR showed pulmonary vascular congestion.  Hospitalist service contacted for further evaluation and management.  IV diuresis started.  Cardiology consulted.  Echo obtained, EF: 35%.  Patient started on aspirin and Coreg.  Prior to admission Raymond catheter had been placed at [facility] due to urinary retention, this was continued during admission.  Patient transitioned to oral diuretic therapy and patient started on Entresto per cardiology.  Hospitalization complicated by patient having hypotension requiring transfer to ICU level of care and initiation of vasopressors.  It is suspected that patient's pain medication was contributing to this, pain medication discontinued and patient able to wean off of vasopressors.  Pulmonology/Critical Care team also noted that there is suspicion that patient may have sleep apnea which may have contributed to his hypotension so patient started on BiPAP therapy.  Patient will have a bed once he is stable for discharge    Interval Followup:   No acute events overnight.  Breathing continues to feel better, attempting to wean off of oxygen today.  Raymond catheter remains in place.  No new complaints.    Objective   Objective     Vitals:   Temp:  [98.1 °F (36.7 °C)-99.1 °F (37.3 °C)] 98.5 °F (36.9 °C)  Heart Rate:  [63-76] 69  Resp:  [16-18] 16  BP: (102-127)/(43-58) 119/43  Flow (L/min):   [1] 1  Physical Exam   Gen: No acute distress, chronically ill-appearing, lying in bed  Resp: Good aeration, nasal cannula in place normal respiratory effort  Card: RRR, No m/r/g  Abd: Soft, Nontender, Nondistended, + bowel sounds    Result Review    Result Review:  I have personally reviewed the results as below and agree with these findings:  []  Laboratory:   CMP        5/7/2023    05:04 5/8/2023    04:23 5/9/2023    05:17   CMP   Glucose 154   150   211     BUN 43   57   45     Creatinine 0.86   0.96   0.90     EGFR 95.5   87.2   94.2     Sodium 136   135   140     Potassium 4.3   3.7   4.0     Chloride 100   99   106     Calcium 9.1   8.6   8.7     Total Protein 5.4    5.5     Albumin 2.7    2.7     Globulin 2.7    2.8     Total Bilirubin 0.2    <0.2     Alkaline Phosphatase 81    86     AST (SGOT) 21    28     ALT (SGPT) 16    27     Albumin/Globulin Ratio 1.0    1.0     BUN/Creatinine Ratio 50.0   59.4   50.0     Anion Gap 6.2   7.7   5.0       CBC        5/7/2023    05:04 5/8/2023    04:23 5/9/2023    05:17   CBC   WBC 7.66   10.95   9.59     RBC 2.89   2.86   2.80     Hemoglobin 8.2   8.2   8.0     Hematocrit 26.3   26.6   26.0     MCV 91.0   93.0   92.9     MCH 28.4   28.7   28.6     MCHC 31.2   30.8   30.8     RDW 16.5   16.5   16.7     Platelets 347   330   308        Magnesium and phosphorus within normal limit    []  Microbiology:   []  Radiology:   [x]  EKG/Telemetry: Telemetry reviewed showed normal sinus rhythm  []  Cardiology/Vascular:    []  Pathology:  []  Old records:  []  Other:    Assessment & Plan   Assessment / Plan     Assessment/plan:  Acute heart failure with reduced ejection fraction, unknown chronicity  Acute hypoxic respiratory failure  Shock requiring vasopressor  Recent BKA  Type 2 diabetes mellitus with hyperglycemia  CAD s/p CABG  Elevated troponin likely due to demand ischemia from heart failure exacerbation  Hypomagnesemia, recurrent  Urinary retention    Continue to monitor in  the hospital for management of the above  Off Lasix currently, suspect patient will have to discharge on lower dose of oral Lasix  Continue Coreg at reduced dose and Entresto.  Monitor blood pressure closely.    Management discussed with Pulmonology/Critical Care team.    Continue with BiPAP at night and with naps.  Patient will need to have formal sleep study done as an outpatient after discharge.  Continue aspirin and atorvastatin   Continue supplemental O2 to maintain sats greater than 90%, wean as tolerated  Continue current insulin regimen  Raymond catheter is to remain in at time of discharge, patient will require referral to urology for urinary retention at time of discharge.  Trend renal function and electrolytes with a.m. BMP, magnesium   Trend Hgb and WBC with a.m. CBC      discussed case with: Bedside RN    CODE STATUS:   Level Of Support Discussed With: Patient  Code Status (Patient has no pulse and is not breathing): CPR (Attempt to Resuscitate)  Medical Interventions (Patient has pulse or is breathing): Full Support

## 2023-05-09 NOTE — THERAPY TREATMENT NOTE
Acute Care - Physical Therapy Treatment Note   [facility]     Patient Name: Balbir Khan  : 1955  MRN: [id number]  Today's Date: 2023      Visit Dx:     ICD-10-CM ICD-9-CM   1. Acute on chronic congestive heart failure, unspecified heart failure type  I50.9 428.0   2. Decreased activities of daily living (ADL)  Z78.9 V49.89   3. Acute HFrEF (heart failure with reduced ejection fraction)  I50.21 428.21   4. Difficulty in walking  R26.2 719.7   5. Hypertension, unspecified type  I10 401.9     Patient Active Problem List   Diagnosis   • Acute on chronic congestive heart failure, unspecified heart failure type     Past Medical History:   Diagnosis Date   • Diabetes mellitus    • Hyperlipidemia    • Hypertension    • Macular degeneration    • Myocardial infarction      Past Surgical History:   Procedure Laterality Date   • BELOW KNEE AMPUTATION Bilateral    • CATARACT EXTRACTION     • CORONARY ARTERY BYPASS GRAFT     • KNEE SURGERY Bilateral      PT Assessment (last 12 hours)     PT Evaluation and Treatment     Row Name 23 1231          Physical Therapy Time and Intention    Subjective Information complains of;weakness;fatigue;pain  -DK     Document Type therapy note (daily note)  -DK     Mode of Treatment individual therapy;physical therapy  -DK     Patient Effort good  -DK     Symptoms Noted During/After Treatment fatigue;increased pain  -DK     Comment Pt reports feeling better, talking about going to rehab in [place].  MD / RN visit mid treatment.  -DK     Row Name 23 1231          Pain    Pretreatment Pain Rating 3/10  -DK     Posttreatment Pain Rating 3/10  -DK     Pain Location - Side/Orientation Right  -DK     Pain Location generalized  -DK     Pain Location - residual limb  -DK     Pain Intervention(s) Repositioned;Ambulation/increased activity;Distraction;Therapeutic presence  -DK     Row Name 23 1231          Cognition    Affect/Mental Status (Cognition) confused;low  arousal/lethargic  -DK     Orientation Status (Cognition) oriented to;person;situation  -DK     Follows Commands (Cognition) WFL  -DK     Cognitive Function WFL  -DK     Row Name 05/09/23 1231          Bed Mobility    Bed Mobility supine-sit-supine  -DK     All Activities, [place] (Bed Mobility) maximum assist (25% patient effort);1 person assist  -DK     Supine-Sit [place] (Bed Mobility) maximum assist (25% patient effort);1 person assist  -DK     Sit-Supine [place] (Bed Mobility) maximum assist (25% patient effort);1 person assist  -DK     Supine-Sit-Supine [place] (Bed Mobility) maximum assist (25% patient effort);1 person assist  -DK     Bed Mobility, Safety Issues decreased use of arms for pushing/pulling;decreased use of legs for bridging/pushing  -DK     Assistive Device (Bed Mobility) bed rails;draw sheet  -DK     Comment, (Bed Mobility) Pt tolerated sitting EOB x 2-3 minutes with SBA.  He was able to work on sit/scooting up the side of the bed x 7-10 reps.  Pt returned to supine post treatment.  -DK     Row Name 05/09/23 1231          Safety Issues, Functional Mobility    Safety Issues Affecting Function (Mobility) awareness of need for assistance;impulsivity;judgment;safety precaution awareness  -DK     Impairments Affecting Function (Mobility) balance;endurance/activity tolerance;pain;strength;shortness of breath  -DK     Cognitive Impairments, Mobility Safety/Performance impulsivity;judgment;safety precaution awareness  -DK     Row Name 05/09/23 1231          Balance    Balance Assessment sitting static balance;sitting dynamic balance  -DK     Static Sitting Balance standby assist  -DK     Dynamic Sitting Balance standby assist;contact guard;1-person assist  -DK     Position, Sitting Balance unsupported;sitting edge of bed  -DK     Balance Interventions sitting;static;dynamic  -DK     Row Name 05/09/23 1231          Motor Skills    Motor Skills --  therapeutic exercises  -DK      Coordination WFL  -     Therapeutic Exercise hip;knee  -DK     Row Name 05/09/23 1231          Hip (Therapeutic Exercise)    Hip (Therapeutic Exercise) AAROM (active assistive range of motion)  -     Hip AAROM (Therapeutic Exercise) bilateral;flexion;extension;aBduction;aDduction;supine;10 repetitions;2 sets  -DK     Row Name 05/09/23 1231          Knee (Therapeutic Exercise)    Knee (Therapeutic Exercise) AAROM (active assistive range of motion)  -     Knee AAROM (Therapeutic Exercise) bilateral;flexion;extension;supine;10 repetitions;2 sets  -DK     Row Name             Residual Limb Assessment 04/28/23 1948 transtibial (below knee), right    Residual Limb Assessment - Properties Group Placement Date: 04/28/23  -CB Placement Time: 1948  -CB Amputation Date: 04/17/23  -CB Location: transtibial (below knee), right  -CB    Retired Residual Limb Assessment - Properties Group Placement Date: 04/28/23  -CB Placement Time: 1948  -CB Amputation Date: 04/17/23  -CB Location: transtibial (below knee), right  -CB    Retired Residual Limb Assessment - Properties Group Date first assessed: 04/28/23  -CB Time first assessed: 1948  -CB Amputation Date: 04/17/23  -CB Location: transtibial (below knee), right  -CB    Row Name             Residual Limb Assessment 04/28/23 1948 transtibial (below knee), left    Residual Limb Assessment - Properties Group Placement Date: 04/28/23  -CB, unknown  Placement Time: 1948  -CB, unknown  Location: transtibial (below knee), left  -CB    Retired Residual Limb Assessment - Properties Group Placement Date: 04/28/23  -CB, unknown  Placement Time: 1948  -CB, unknown  Location: transtibial (below knee), left  -CB    Retired Residual Limb Assessment - Properties Group Date first assessed: 04/28/23  -CB, unknown  Time first assessed: 1948  -CB, unknown  Location: transtibial (below knee), left  -CB    Row Name             Wound 04/28/23 1800 Right midline coccyx Pressure Injury    Wound -  Properties Group Placement Date: 04/28/23  -CZ Placement Time: 1800  -CZ Present on Hospital Admission: Y  -CZ Side: Right  -CZ Orientation: midline  -CZ Location: coccyx  -CZ Primary Wound Type: Pressure inj  -CZ    Retired Wound - Properties Group Placement Date: 04/28/23  -CZ Placement Time: 1800  -CZ Present on Hospital Admission: Y  -CZ Side: Right  -CZ Orientation: midline  -CZ Location: coccyx  -CZ Primary Wound Type: Pressure inj  -CZ    Retired Wound - Properties Group Date first assessed: 04/28/23  -CZ Time first assessed: 1800  -CZ Present on Hospital Admission: Y  -CZ Side: Right  -CZ Location: coccyx  -CZ Primary Wound Type: Pressure inj  -CZ    Row Name             Wound 05/01/23 0940 Right leg Incision    Wound - Properties Group Placement Date: 05/01/23  -FL Placement Time: 0940  -FL Present on Hospital Admission: Y  -FL Side: Right  -FL Location: leg  -FL Primary Wound Type: Incision  -FL    Retired Wound - Properties Group Placement Date: 05/01/23  -FL Placement Time: 0940  -FL Present on Hospital Admission: Y  -FL Side: Right  -FL Location: leg  -FL Primary Wound Type: Incision  -FL    Retired Wound - Properties Group Date first assessed: 05/01/23  -FL Time first assessed: 0940  -FL Present on Hospital Admission: Y  -FL Side: Right  -FL Location: leg  -FL Primary Wound Type: Incision  -FL    Row Name 05/09/23 1231          Plan of Care Review    Plan of Care Reviewed With patient  -DK     Progress improving  -DK     Row Name 05/09/23 1231          Positioning and Restraints    Pre-Treatment Position in bed  -DK     Post Treatment Position bed  -DK     In Bed supine;call light within reach;encouraged to call for assist;exit alarm on;side rails up x2;legs elevated  -DK     Row Name 05/09/23 1231          Therapy Assessment/Plan (PT)    Rehab Potential (PT) fair, will monitor progress closely  -DK     Criteria for Skilled Interventions Met (PT) skilled treatment is necessary  -DK     Therapy  Frequency (PT) daily  -DK     Problem List (PT) problems related to;balance;cognition;mobility;range of motion (ROM);strength;pain;hearing  -DK     Activity Limitations Related to Problem List (PT) unable to ambulate safely;unable to transfer safely  -DK     Row Name 05/09/23 1231          Progress Summary (PT)    Progress Toward Functional Goals (PT) progress toward functional goals is fair  -DK           User Key  (r) = Recorded By, (t) = Taken By, (c) = Cosigned By    Initials Name Provider Type    Lacy Gruber, RN Registered Nurse    Tiara Padilla PTA Physical Therapist Assistant    Nichole Escamilla RN Registered Nurse    Naomy Ruiz RN Registered Nurse                  PT Recommendation and Plan  Planned Therapy Interventions (PT): balance training, bed mobility training, gait training, strengthening, transfer training  Therapy Frequency (PT): daily  Progress Summary (PT)  Progress Toward Functional Goals (PT): progress toward functional goals is fair  Plan of Care Reviewed With: patient  Progress: improving   Outcome Measures     Row Name 05/09/23 1231 05/08/23 1219          How much help from another person do you currently need...    Turning from your back to your side while in flat bed without using bedrails? 3  -DK 2  -DK     Moving from lying on back to sitting on the side of a flat bed without bedrails? 2  -DK 2  -DK     Moving to and from a bed to a chair (including a wheelchair)? 2  -DK 2  -DK     Standing up from a chair using your arms (e.g., wheelchair, bedside chair)? 2  -DK 2  -DK     Climbing 3-5 steps with a railing? 1  -DK 1  -DK     To walk in hospital room? 1  -DK 1  -DK     AM-PAC 6 Clicks Score (PT) 11  -DK 10  -DK        Functional Assessment    Outcome Measure Options AM-PAC 6 Clicks Basic Mobility (PT)  -DK AM-PAC 6 Clicks Basic Mobility (PT)  -DK           User Key  (r) = Recorded By, (t) = Taken By, (c) = Cosigned By    Initials Name Provider Type    INDERJIT Jordan  MARCELINO Menjivar Physical Therapist Assistant                 Time Calculation:    PT Charges     Row Name 05/09/23 1236             Time Calculation    PT Received On 05/09/23  -DK      PT Goal Re-Cert Due Date 05/10/23  -DK         Timed Charges    98393 - PT Therapeutic Exercise Minutes 16  -DK      43378 - PT Therapeutic Activity Minutes 22  -DK         Total Minutes    Timed Charges Total Minutes 38  -DK       Total Minutes 38  -DK            User Key  (r) = Recorded By, (t) = Taken By, (c) = Cosigned By    Initials Name Provider Type    Tiara Padilla PTA Physical Therapist Assistant              Therapy Charges for Today     Code Description Service Date Service Provider Modifiers Qty    13814374248 HC PT THER PROC EA 15 MIN 5/8/2023 Tiara Jordan, PTA GP 1    15956058128 HC PT THERAPEUTIC ACT EA 15 MIN 5/8/2023 Tiara Jordan, PTA GP 1    15851299607 HC PT THER PROC EA 15 MIN 5/9/2023 Tiara Jordan, MARCELINO GP 1    35927296337 HC PT THERAPEUTIC ACT EA 15 MIN 5/9/2023 Tiara Jordan, MARCELINO GP 2          PT G-Codes  Outcome Measure Options: AM-PAC 6 Clicks Basic Mobility (PT)  AM-PAC 6 Clicks Score (PT): 11  AM-PAC 6 Clicks Score (OT): 15    Tiara Jordan PTA  5/9/2023

## 2023-05-09 NOTE — SIGNIFICANT NOTE
Wound Eval / Progress Noted    [facility]     Patient Name: Balbir Khan  : 1955  MRN: [id number]  Today's Date: 2023                 Admit Date: 2023    Visit Dx:    ICD-10-CM ICD-9-CM   1. Acute on chronic congestive heart failure, unspecified heart failure type  I50.9 428.0   2. Decreased activities of daily living (ADL)  Z78.9 V49.89   3. Acute HFrEF (heart failure with reduced ejection fraction)  I50.21 428.21   4. Difficulty in walking  R26.2 719.7   5. Hypertension, unspecified type  I10 401.9       Patient Active Problem List   Diagnosis   • Acute on chronic congestive heart failure, unspecified heart failure type        Past Medical History:   Diagnosis Date   • Diabetes mellitus    • Hyperlipidemia    • Hypertension    • Macular degeneration    • Myocardial infarction         Past Surgical History:   Procedure Laterality Date   • BELOW KNEE AMPUTATION Bilateral    • CATARACT EXTRACTION     • CORONARY ARTERY BYPASS GRAFT     • KNEE SURGERY Bilateral          Physical Assessment:  Wound 23 1800 Right midline coccyx Pressure Injury (Active)   Wound Image   23 1125   Dressing Appearance open to air 23 1125   Closure None 23 1125   Base pink;moist;blanchable;epithelialization 23 1125   Periwound intact;blanchable;pink 23 1125   Periwound Temperature warm 23 1125   Periwound Skin Turgor soft 23 1125   Edges rolled/closed 23 1125   Drainage Amount none 23 1125   Care, Wound cleansed with;sterile normal saline 23 1125   Dressing Care open to air;skin barrier agent applied 23 1125   Periwound Care barrier ointment applied 23 1125       Wound 23 0940 Right leg Incision (Active)   Wound Image   23 1125   Dressing Appearance intact;dry;no drainage 23 1125   Closure Staples 23 1125   Base dry;pink 23 1125   Periwound intact;warm;pink 23 1125   Periwound Temperature warm 23 1125    Periwound Skin Turgor soft 05/09/23 1125   Edges rolled/closed 05/09/23 1125   Drainage Amount none 05/09/23 1125   Care, Wound cleansed with;sterile normal saline 05/09/23 1125   Dressing Care dressing applied;non-adherent;petroleum-based;gauze;gauze, dry;tubular wrap;elastic bandage 05/09/23 1125   Periwound Care absorptive dressing applied 05/09/23 1125       Wound 05/09/23 1405 Left gluteal (Active)   Wound Image   05/09/23 1125   Dressing Appearance open to air 05/09/23 1125   Closure None 05/09/23 1125   Base non-blanchable;moist;yellow 05/09/23 1125   Yellow (%), Wound Tissue Color 100 05/09/23 1125   Periwound intact;dry;blanchable;redness 05/09/23 1125   Periwound Temperature warm 05/09/23 1125   Periwound Skin Turgor soft 05/09/23 1125   Edges open 05/09/23 1125   Wound Length (cm) 0.4 cm 05/09/23 1125   Wound Width (cm) 0.3 cm 05/09/23 1125   Wound Depth (cm) 0.1 cm 05/09/23 1125   Wound Surface Area (cm^2) 0.12 cm^2 05/09/23 1125   Wound Volume (cm^3) 0.012 cm^3 05/09/23 1125   Drainage Amount none 05/09/23 1125   Care, Wound cleansed with;sterile normal saline 05/09/23 1125   Dressing Care dressing applied;silver impregnated;hydrofiber;silicone;border dressing 05/09/23 1125   Periwound Care absorptive dressing applied 05/09/23 1125        Wound Check / Follow-up:  Patient seen today for a wound check and dressing change. Patient with resolving stage III to the right gluteal aspect with pink epithelium noted and blanching noted. Cleansed with NS. Recommend TID/PRN application of the blue top moisture barrier to the right gluteal aspect. Tissue to the sacral area / left, with yellow dry wound base noted; injury likely from moisture / friction. Cleansed with NS. Recommend to continue current wound care as ordered.  Right residual limb with staples approximating the wound edges. Tissue remains intact with no evidence of dehiscence or drainage. Cleansed with NS. Recommend to continue current wound  care    Impression: resolving stage III, moisture / friction    Short term goals:  Regain skin integrity, pressure reduction, skin protection, daily dressing changes.    Tasha Babin RN    5/9/2023    14:06 EDT

## 2023-05-09 NOTE — SIGNIFICANT NOTE
05/09/23 1416   Plan   Plan Per MD patient will be ready for DC tomorrow. Patient is still agreeable to admit to [facility]. Facility is able to accept patient tomorrow.   Final Discharge Disposition Code 03 - skilled nursing facility (SNF)

## 2023-05-10 VITALS
DIASTOLIC BLOOD PRESSURE: 61 MMHG | OXYGEN SATURATION: 96 % | WEIGHT: 162.04 LBS | HEIGHT: 60 IN | RESPIRATION RATE: 18 BRPM | TEMPERATURE: 98.2 F | BODY MASS INDEX: 31.81 KG/M2 | HEART RATE: 66 BPM | SYSTOLIC BLOOD PRESSURE: 113 MMHG

## 2023-05-10 LAB
GLUCOSE BLDC GLUCOMTR-MCNC: 153 MG/DL (ref 70–99)
GLUCOSE BLDC GLUCOMTR-MCNC: 171 MG/DL (ref 70–99)
GLUCOSE BLDC GLUCOMTR-MCNC: 237 MG/DL (ref 70–99)

## 2023-05-10 PROCEDURE — 94761 N-INVAS EAR/PLS OXIMETRY MLT: CPT

## 2023-05-10 PROCEDURE — 94799 UNLISTED PULMONARY SVC/PX: CPT

## 2023-05-10 PROCEDURE — 94660 CPAP INITIATION&MGMT: CPT

## 2023-05-10 PROCEDURE — 99239 HOSP IP/OBS DSCHRG MGMT >30: CPT | Performed by: INTERNAL MEDICINE

## 2023-05-10 PROCEDURE — 82948 REAGENT STRIP/BLOOD GLUCOSE: CPT

## 2023-05-10 PROCEDURE — 25010000002 HEPARIN (PORCINE) PER 1000 UNITS: Performed by: INTERNAL MEDICINE

## 2023-05-10 PROCEDURE — 63710000001 INSULIN LISPRO (HUMAN) PER 5 UNITS: Performed by: INTERNAL MEDICINE

## 2023-05-10 RX ORDER — CARVEDILOL 3.12 MG/1
3.12 TABLET ORAL 2 TIMES DAILY WITH MEALS
Start: 2023-05-10

## 2023-05-10 RX ORDER — TAMSULOSIN HYDROCHLORIDE 0.4 MG/1
1 CAPSULE ORAL DAILY
Qty: 30 CAPSULE
Start: 2023-05-10

## 2023-05-10 RX ORDER — FUROSEMIDE 20 MG/1
20 TABLET ORAL DAILY
Start: 2023-05-10

## 2023-05-10 RX ADMIN — PREGABALIN 150 MG: 75 CAPSULE ORAL at 08:38

## 2023-05-10 RX ADMIN — HEPARIN SODIUM 5000 UNITS: 5000 INJECTION INTRAVENOUS; SUBCUTANEOUS at 07:22

## 2023-05-10 RX ADMIN — ACETAMINOPHEN 650 MG: 325 TABLET ORAL at 12:49

## 2023-05-10 RX ADMIN — INSULIN LISPRO 2 UNITS: 100 INJECTION, SOLUTION INTRAVENOUS; SUBCUTANEOUS at 08:38

## 2023-05-10 RX ADMIN — CARVEDILOL 3.12 MG: 3.12 TABLET, FILM COATED ORAL at 08:38

## 2023-05-10 RX ADMIN — HYDROCORTISONE ACETATE 1 APPLICATION: 1 CREAM TOPICAL at 08:39

## 2023-05-10 RX ADMIN — INSULIN LISPRO 2 UNITS: 100 INJECTION, SOLUTION INTRAVENOUS; SUBCUTANEOUS at 11:56

## 2023-05-10 RX ADMIN — Medication 10 ML: at 08:47

## 2023-05-10 RX ADMIN — ASPIRIN 81 MG 81 MG: 81 TABLET ORAL at 08:38

## 2023-05-10 RX ADMIN — SACUBITRIL AND VALSARTAN 1 TABLET: 24; 26 TABLET, FILM COATED ORAL at 08:38

## 2023-05-10 NOTE — DISCHARGE SUMMARY
[facility]         HOSPITALIST  DISCHARGE SUMMARY    Patient Name: Balbir Khan  : 1955  MRN: [id number]    Date of Admission: 2023  Date of Discharge: 5/10/2023  Primary Care Physician: Rom Barrett PA-C    Consults     Date and Time Order Name Status Description    2023  7:29 AM Inpatient Pulmonology Consult Completed     2023  1:26 PM Inpatient Cardiology Consult      2023  1:08 PM Hospitalist (on-call MD unless specified)            Active and Resolved Hospital Problems:  Active Hospital Problems    Diagnosis POA   • **Acute on chronic congestive heart failure, unspecified heart failure type [I50.9] Yes      Resolved Hospital Problems   No resolved problems to display.   Acute heart failure with reduced ejection fraction, unknown chronicity  Acute hypoxic respiratory failure  Shock requiring vasopressor  Recent BKA  Type 2 diabetes mellitus with hyperglycemia  CAD s/p CABG  Elevated troponin likely due to demand ischemia from heart failure exacerbation  Hypomagnesemia, recurrent  Urinary retention requiring Raymond catheterization    Hospital Course     Hospital Course:  Balbir Khan is a 67 y.o. male with CAD s/p CABG, dyslipidemia, hypertension, type 2 diabetes mellitus, vascular disease and history of BKA who presented with complaints of shortness of breath.  Patient found to be hypoxic in the ED.  Labs significant for elevated BNP and troponin.  CXR showed pulmonary vascular congestion.  Hospitalist service contacted for further evaluation and management.  IV diuresis started.  Cardiology consulted.  Echo obtained, EF: 35%.  Patient started on aspirin and Coreg.  Prior to admission Raymond catheter had been placed at [facility] due to urinary retention, this was continued during admission.  Patient transitioned to oral diuretic therapy and patient started on Entresto per cardiology.  Hospitalization complicated by patient having hypotension requiring  transfer to ICU level of care and initiation of vasopressors.  It is suspected that patient's pain medication was contributing to this, pain medication discontinued and patient able to wean off of vasopressors.  Pulmonology/Critical Care team also noted that there is suspicion that patient may have sleep apnea which may have contributed to his hypotension so patient started on BiPAP therapy.  Clinically, he improved significantly and was weaned off oxygen prior to discharge.  Medications were adjusted.  PT/OT consulted and recommended rehab.  He was discharged in stable condition to [facility] on 5/10/2023.  Recommend follow-up with heart failure clinic within 1-2 weeks, cardiology, pulmonology, and urology within 2-3 weeks.  Of note, patient is high risk of readmission due to continued refusal to wear BiPAP nightly.    DISCHARGE Follow Up Recommendations for labs and diagnostics: BMP in 1-2 weeks to monitor potassium and renal function    BiPAP settings: 10/5    Day of Discharge     Vital Signs:  Temp:  [98.2 °F (36.8 °C)-98.8 °F (37.1 °C)] 98.2 °F (36.8 °C)  Heart Rate:  [63-73] 66  Resp:  [16-18] 18  BP: ()/(43-61) 113/61  Flow (L/min):  [1] 1  Physical Exam:   Gen: Chronically ill-appearing male NAD, WDWN  ENT: PERRL, EOMI   CV: RRR no MRG  Pulm: CTAB, no w/r/r  GI: Abd soft, NTND, +bs  Neuro: Moving all extremities spontaneously, CN II-XII grossly intact   Psych: A&O*3, normal mood and affect  Skin: Bilateral BKA's noted, no lesions or rashes noted      Discharge Details        Discharge Medications      New Medications      Instructions Start Date   carvedilol 3.125 MG tablet  Commonly known as: COREG   3.125 mg, Oral, 2 Times Daily With Meals      furosemide 20 MG tablet  Commonly known as: Lasix   20 mg, Oral, Daily      sacubitril-valsartan 24-26 MG tablet  Commonly known as: ENTRESTO   1 tablet, Oral, Every 12 Hours Scheduled      tamsulosin 0.4 MG capsule 24 hr capsule  Commonly known  as: FLOMAX   0.4 mg, Oral, Daily         Continue These Medications      Instructions Start Date   acetaminophen 325 MG tablet  Commonly known as: TYLENOL   325 mg, Oral, Every 6 Hours PRN      aspirin 81 MG chewable tablet   81 mg, Oral, 2 Times Daily      atorvastatin 40 MG tablet  Commonly known as: LIPITOR   80 mg, Oral, Nightly      glucagon 1 MG injection  Commonly known as: GLUCAGEN   1 mg, Subcutaneous, Once      insulin aspart 100 UNIT/ML solution pen-injector sc pen  Commonly known as: novoLOG FLEXPEN   Subcutaneous, 4 Times Daily With Meals & Nightly      lactobacillus tablet caplet   1 tablet, Oral, Nightly      metFORMIN 500 MG tablet  Commonly known as: GLUCOPHAGE   500 mg, Oral, 2 Times Daily With Meals      pregabalin 75 MG capsule  Commonly known as: LYRICA   75 mg, Oral, 2 Times Daily         Stop These Medications    lisinopril 10 MG tablet  Commonly known as: PRINIVIL,ZESTRIL     oxyCODONE 5 MG immediate release tablet  Commonly known as: ROXICODONE     psyllium 58.6 % packet  Commonly known as: METAMUCIL            Allergies   Allergen Reactions   • Ibuprofen Unknown - Low Severity       Discharge Disposition:  Rehab Facility or Unit (DC - External)    Diet:  Hospital:  Diet Order   Procedures   • Diet: Cardiac Diets, Diabetic Diets, Fluid Restriction (240 mL/tray) Diets; Low Sodium (2g); Consistent Carbohydrate; 2000 mL/day; Texture: Regular Texture (IDDSI 7); Fluid Consistency: Thin (IDDSI 0)       Discharge Activity:   Activity Instructions     Activity as Tolerated            CODE STATUS:  Code Status and Medical Interventions:   Ordered at: 04/28/23 1350     Level Of Support Discussed With:    Patient     Code Status (Patient has no pulse and is not breathing):    CPR (Attempt to Resuscitate)     Medical Interventions (Patient has pulse or is breathing):    Full Support         No future appointments.    Additional Instructions for the Follow-ups that You Need to Schedule     Ambulatory  Referral to Sleep Medicine   As directed      Follow-up needed: Yes         Discharge Follow-up with PCP   As directed       Currently Documented PCP:    Rom Barrett PA-C    PCP Phone Number:    [phone]     Follow Up Details: 3-5 days         Discharge Follow-up with Specified Provider: Cardiology; 1 Month   As directed      To: Cardiology    Follow Up: 1 Month         Discharge Follow-up with Specified Provider: Pulmonology; 3 Weeks   As directed      To: Pulmonology    Follow Up: 3 Weeks         Discharge Follow-up with Specified Provider: Urology Dr. Sauer; 2 Weeks   As directed      To: Urology Dr. Sauer    Follow Up: 2 Weeks               Pertinent  and/or Most Recent Results     PROCEDURES:   None    LAB RESULTS:      Lab 05/09/23 0517 05/08/23 0423 05/07/23  0504 05/06/23 0302 05/05/23 0235   WBC 9.59 10.95* 7.66 6.89 7.64   HEMOGLOBIN 8.0* 8.2* 8.2* 9.5* 8.4*   HEMATOCRIT 26.0* 26.6* 26.3* 30.7* 26.8*   PLATELETS 308 330 347 411 378   NEUTROS ABS 5.40  --   --   --   --    IMMATURE GRANS (ABS) 0.03  --   --   --   --    LYMPHS ABS 2.84  --   --   --   --    MONOS ABS 0.88  --   --   --   --    EOS ABS 0.39  --   --   --   --    MCV 92.9 93.0 91.0 91.6 91.2         Lab 05/09/23 0517 05/08/23 0423 05/07/23 0504 05/06/23 0302 05/05/23 0235 05/04/23 2057 05/04/23  0404   SODIUM 140 135* 136 138 137   < > 137   POTASSIUM 4.0 3.7 4.3 4.1 3.8   < > 5.3*   CHLORIDE 106 99 100 101 101   < > 99   CO2 29.0 28.3 29.8* 30.2* 28.3   < > 31.2*   ANION GAP 5.0 7.7 6.2 6.8 7.7   < > 6.8   BUN 45* 57* 43* 30* 23   < > 30*   CREATININE 0.90 0.96 0.86 0.59* 0.68*   < > 0.93   EGFR 94.2 87.2 95.5 107.0 102.5   < > 90.6   GLUCOSE 211* 150* 154* 103* 240*   < > 169*   CALCIUM 8.7 8.6 9.1 8.6 8.5*   < > 8.5*   MAGNESIUM 1.9 2.1 1.7 1.9 1.7   < > 2.0   PHOSPHORUS  --  3.0 2.9 3.1 3.7  --  2.9    < > = values in this interval not displayed.         Lab 05/09/23  0517 05/07/23  0504 05/06/23  0302    TOTAL PROTEIN 5.5* 5.4* 5.4*   ALBUMIN 2.7* 2.7* 2.3*   GLOBULIN 2.8 2.7 3.1   ALT (SGPT) 27 16 10   AST (SGOT) 28 21 14   BILIRUBIN <0.2 0.2 0.2   ALK PHOS 86 81 84         Lab 05/04/23  2249 05/04/23  2057   HSTROP T 117* 127*                 Brief Urine Lab Results  (Last result in the past 365 days)      Color   Clarity   Blood   Leuk Est   Nitrite   Protein   CREAT   Urine HCG        04/23/23 1000 Yellow   Clear   Negative   Trace   Negative   Trace               Microbiology Results (last 10 days)     ** No results found for the last 240 hours. **          XR Chest 1 View    Result Date: 5/5/2023  Impression:   1. Near complete clearing of bibasilar ground-glass opacities.       SYLVIA SUE MD       Electronically Signed and Approved By: SYLVIA SUE MD on 5/05/2023 at 9:03             XR Chest 1 View    Result Date: 4/28/2023  Impression:   1. Pulmonary vascular congestion and increased interstitial opacities which could represent underlying pulmonary edema.  Asymmetric opacity on the right may represent superimposed pneumonia, atelectasis and/or asymmetric edema.  There appears to be at least a small right-sided pleural effusion       ABBEY BENITO MD       Electronically Signed and Approved By: ABBEY BENITO MD on 4/28/2023 at 10:20                Adult Transthoracic Echo Complete W/ Cont if Necessary Per Protocol    Result Date: 4/29/2023  Narrative: •  Global hypokinesis with ejection fraction estimated at 35±5%. •  The left ventricular cavity is mildly dilated. •  Left ventricular diastolic function is consistent with (grade I) impaired relaxation and age. •  The right atrial cavity is mildly  dilated.  The left atrial cavity is moderately dilated. •  Moderate mitral valve regurgitation is present. •  Estimated right ventricular systolic pressure from tricuspid regurgitation is markedly elevated (>55 mmHg). •  Mild to moderate tricuspid regurgitation. •  Pleural effusion is noted.     XR Chest  1 View    Result Date: 5/5/2023  Narrative: PROCEDURE: XR CHEST 1 VW  COMPARISON: [facility], CR, XR CHEST 1 VW, 4/28/2023, 9:51.  INDICATIONS: hypoxia  FINDINGS:  Status post median sternotomy.  Cardiac and mediastinal contours are within normal limits.  Near complete clearing of bibasilar ground-glass opacities.  Regional skeleton is unremarkable.      Impression:   1. Near complete clearing of bibasilar ground-glass opacities.       SYLVIA SUE MD       Electronically Signed and Approved By: SYLVIA SUE MD on 5/05/2023 at 9:03             XR Chest 1 View    Result Date: 4/28/2023  Narrative: PROCEDURE: XR CHEST 1 VW  COMPARISON: None  INDICATIONS: shortness of air  FINDINGS:  Study is limited by overlying support and monitoring apparatus.  Lung volumes are low.  Patient is status post median sternotomy and CABG.  There is pulmonary vascular congestion and increased ground-glass and interstitial opacities with a perihilar and bibasilar predominance.  More focal consolidation is noted in the mid and lower lung zones on the right.  There appears to be a small right-sided pleural effusion.  Osseous structures are unremarkable      Impression:   1. Pulmonary vascular congestion and increased interstitial opacities which could represent underlying pulmonary edema.  Asymmetric opacity on the right may represent superimposed pneumonia, atelectasis and/or asymmetric edema.  There appears to be at least a small right-sided pleural effusion       ABBEY BENITO MD       Electronically Signed and Approved By: ABBEY BENITO MD on 4/28/2023 at 10:20                     Results for orders placed during the hospital encounter of 04/28/23    Adult Transthoracic Echo Complete W/ Cont if Necessary Per Protocol    Interpretation Summary  •  Global hypokinesis with ejection fraction estimated at 35±5%.  •  The left ventricular cavity is mildly dilated.  •  Left ventricular diastolic function is consistent with  (grade I) impaired relaxation and age.  •  The right atrial cavity is mildly  dilated.  The left atrial cavity is moderately dilated.  •  Moderate mitral valve regurgitation is present.  •  Estimated right ventricular systolic pressure from tricuspid regurgitation is markedly elevated (>55 mmHg).  •  Mild to moderate tricuspid regurgitation.  •  Pleural effusion is noted.      Labs Pending at Discharge:        Time spent on Discharge including face to face service:  34 minutes    Electronically signed by Fabio Aceves MD, 05/10/23, 8:48 AM EDT.

## 2023-05-10 NOTE — PLAN OF CARE
Goal Outcome Evaluation:           Progress: no change  Outcome Evaluation: BP dipped to 98/49 and was also disoriented at start of shift. Discussed with hospitalist, opioid order was DC'd. IV bolus was given and BP improved. Pt also had a large number of loose bowel movements yesterday. Order obtained for stool sample, awaiting pt to have a sufficient amount for sample to send to lab. Pt is alert and oriented this morning. Concepcion Jones RN

## 2023-05-10 NOTE — PLAN OF CARE
Goal Outcome Evaluation:   Pt wore bipap last night, removed this morning and is now on RA.

## 2023-05-14 NOTE — PROGRESS NOTES
"Enter Query Response Below      Query Response: Stage III pressure injury to the right lower gluteal aspect, present on admission             If applicable, please update the problem list.   If you have any questions about this query contact me at: [email or web address]     Dr. Coronado,    Patient admitted 4/28/23 with acute systolic CHF and acute hypoxic respiratory failure was assessed by the wound care RN on 5/1/23. Per this assessment, \"Pressure injury stage III noted to the right lower gluteal aspect. Wound bed is partially pink with noted yellow slough. Cleansed with NS and gauze.. Recommending daily dressing with silver impregnated hydrofiber and silicone border dressing. Medial gluteal aspect with blanchable redness and one small area of tissue loss measuring 0.4x0.2x0.1 cm. Likely from either moisture or friction and shearing as it lies within the crease when skin is not spread\". Clinical photos taken 5/1/23 accompany the assessment. However, Nursing flowsheet documentation states the wound was present on admission date of 4/28/23. After study, can the gluteal wound be further specified as:    Stage III pressure injury to the right lower gluteal aspect, present on admission  Gluteal wound not suspected to be due to pressure  Other, ____________________  Unable to be clinically determined      By submitting this query, we are merely seeking further clarification of documentation to accurately reflect all conditions that you are monitoring, evaluating, treating or that extend the hospitalization or utilize additional resources of care. Please utilize your independent clinical judgment when addressing the question(s) above.     This query and your response, once completed, will be entered into the legal medical record.    Sincerely,  Jamila Duran RN  Clinical Documentation Integrity Program     "

## 2023-05-16 LAB — QT INTERVAL: 389 MS

## 2023-05-18 LAB — QT INTERVAL: 356 MS
